# Patient Record
Sex: MALE | Race: WHITE | NOT HISPANIC OR LATINO | Employment: OTHER | URBAN - NONMETROPOLITAN AREA
[De-identification: names, ages, dates, MRNs, and addresses within clinical notes are randomized per-mention and may not be internally consistent; named-entity substitution may affect disease eponyms.]

---

## 2017-01-09 ENCOUNTER — PROCEDURE VISIT (OUTPATIENT)
Dept: FAMILY MEDICINE CLINIC | Facility: CLINIC | Age: 42
End: 2017-01-09

## 2017-01-09 VITALS
BODY MASS INDEX: 24.62 KG/M2 | RESPIRATION RATE: 16 BRPM | HEART RATE: 90 BPM | SYSTOLIC BLOOD PRESSURE: 115 MMHG | WEIGHT: 172 LBS | TEMPERATURE: 98.1 F | OXYGEN SATURATION: 99 % | HEIGHT: 70 IN | DIASTOLIC BLOOD PRESSURE: 81 MMHG

## 2017-01-09 DIAGNOSIS — E05.90 HYPERTHYROIDISM: ICD-10-CM

## 2017-01-09 DIAGNOSIS — L98.9 SKIN LESION OF BACK: Primary | ICD-10-CM

## 2017-01-09 PROCEDURE — 11400 EXC TR-EXT B9+MARG 0.5 CM<: CPT | Performed by: INTERNAL MEDICINE

## 2017-01-09 NOTE — MR AVS SNAPSHOT
Douglas Tan   1/9/2017 1:15 PM   Procedure visit    Dept Phone:  [phone]   Encounter #:  [id number]    Provider:  Nicolas Salcido MD   Department:  [facility] PRIMARY CARE                Your Full Care Plan              Your Updated Medication List          This list is accurate as of: 1/9/17  2:04 PM.  Always use your most recent med list.                ALPRAZolam 1 MG tablet   Commonly known as:  XANAX       gabapentin 600 MG tablet   Commonly known as:  NEURONTIN   Take 2 tablets by mouth 3 (three) times a day.       NUEDEXTA PO       oxyCODONE-acetaminophen  MG per tablet   Commonly known as:  PERCOCET       promethazine 25 MG tablet   Commonly known as:  PHENERGAN   Take 1 tablet by mouth every 6 (six) hours as needed for nausea or vomiting.               You Were Diagnosed With        Codes Comments    Skin lesion of back    -  Primary ICD-10-CM: L98.9  ICD-9-CM: 709.9     Hyperthyroidism     ICD-10-CM: E05.90  ICD-9-CM: 242.90       Instructions     None    Patient Instructions History      Upcoming Appointments     Visit Type Date Time Department    IN OFFICE PROCEDURE 1/9/2017  1:15 PM [facility]    IN OFFICE PROCEDURE 1/19/2017  1:15 PM [facility]  MILLIE R    FOLLOW UP 2/20/2017  9:00 AM [facility] NEURO CONSULT [facility]VN    OFFICE VISIT 3/16/2017 11:30 AM [facility]      MyChart Signup     Our records indicate that you have declined [facility] MyChart signup. If you would like to sign up for [email or web address], please email [email or web address] or call [phone] to obtain an activation code.             Other Info from Your Visit           Your Appointments     Jan 19, 2017  1:15 PM EST   IN OFFICE PROCEDURE with Nicolas Salcido MD   [facility] PRIMARY CARE (--)    [address] [address] [phone]           Bring medication list, test results, and radiology films that apply.       "   Feb 20, 2017  9:00 AM EST   Follow Up with Manuel Young MD   [facility] NEUROLOGY (--)    [address] [facility] [address] [phone]           Arrive 15 minutes prior to appointment.            Mar 16, 2017 11:30 AM EDT   Office Visit with Nicolas Salcido MD   [facility] PRIMARY CARE (--)    [address]  [facility] [address] [phone]           Arrive 15 minutes prior to appointment.              Allergies     Amitriptyline      Made him mean    Buspirone      Didn't \"feel right\"    Duloxetine Hcl      Urinary retention    Shellfish-derived Products      Venlafaxine      Constipation, GI upset    Penicillins  Rash      Reason for Visit     Skin Lesion Removal removal of skin lesion from back      Vital Signs     Blood Pressure Pulse Temperature Respirations Height Weight    115/81 (BP Location: Left arm, Patient Position: Sitting, Cuff Size: Large Adult) 90 98.1 °F (36.7 °C) (Oral) 16 70\" (177.8 cm) 172 lb (78 kg)    Oxygen Saturation Body Mass Index Smoking Status             99% 24.68 kg/m2 Current Every Day Smoker         Problems and Diagnoses Noted     Skin lesion of back    -  Primary    Hyperthyroidism            "

## 2017-01-09 NOTE — PROGRESS NOTES
Skin Lesion Removal.  Indication: uncertain behavior   Pt was consented. Area prepped.  Lidocaine with epi 1% given subepidermally.  Shave of entire lesion done.  0.5 cm.  Electrodesiccation used for hemostasis.  Specimen sent to path.  Verbal wound care instructions given.  Pt tolerated procedure well.  Location left upper back.

## 2017-01-12 NOTE — PROGRESS NOTES
Please let them know the lesion is not cancer by we need to stay on these lesion and continue to remove them.

## 2017-02-06 DIAGNOSIS — R11.0 CHRONIC NAUSEA: ICD-10-CM

## 2017-02-06 DIAGNOSIS — G43.709 CHRONIC MIGRAINE WITHOUT AURA WITHOUT STATUS MIGRAINOSUS, NOT INTRACTABLE: ICD-10-CM

## 2017-02-06 DIAGNOSIS — G89.4 CHRONIC PAIN SYNDROME: ICD-10-CM

## 2017-02-06 DIAGNOSIS — M54.2 NECK PAIN: ICD-10-CM

## 2017-02-06 DIAGNOSIS — R25.2 SPASM: ICD-10-CM

## 2017-02-06 RX ORDER — PROMETHAZINE HYDROCHLORIDE 25 MG/1
25 TABLET ORAL EVERY 6 HOURS PRN
Qty: 50 TABLET | Refills: 0 | Status: SHIPPED | OUTPATIENT
Start: 2017-02-06 | End: 2017-03-08 | Stop reason: SDUPTHER

## 2017-02-06 RX ORDER — GABAPENTIN 600 MG/1
1200 TABLET ORAL 3 TIMES DAILY
Qty: 180 TABLET | Refills: 0 | Status: SHIPPED | OUTPATIENT
Start: 2017-02-06 | End: 2017-03-08 | Stop reason: SDUPTHER

## 2017-02-23 ENCOUNTER — HOSPITAL ENCOUNTER (OUTPATIENT)
Dept: OTHER | Age: 42
Discharge: HOME | End: 2017-02-23

## 2017-03-06 DIAGNOSIS — R25.2 SPASM: ICD-10-CM

## 2017-03-06 DIAGNOSIS — G89.4 CHRONIC PAIN SYNDROME: ICD-10-CM

## 2017-03-06 DIAGNOSIS — M54.2 NECK PAIN: ICD-10-CM

## 2017-03-06 DIAGNOSIS — G43.709 CHRONIC MIGRAINE WITHOUT AURA WITHOUT STATUS MIGRAINOSUS, NOT INTRACTABLE: ICD-10-CM

## 2017-03-06 RX ORDER — GABAPENTIN 600 MG/1
TABLET ORAL
Qty: 180 TABLET | Refills: 0 | OUTPATIENT
Start: 2017-03-06

## 2017-03-08 ENCOUNTER — TELEPHONE (OUTPATIENT)
Dept: NEUROLOGY | Facility: CLINIC | Age: 42
End: 2017-03-08

## 2017-03-08 DIAGNOSIS — R11.0 CHRONIC NAUSEA: ICD-10-CM

## 2017-03-08 DIAGNOSIS — G89.4 CHRONIC PAIN SYNDROME: ICD-10-CM

## 2017-03-08 DIAGNOSIS — R25.2 SPASM: ICD-10-CM

## 2017-03-08 DIAGNOSIS — G43.709 CHRONIC MIGRAINE WITHOUT AURA WITHOUT STATUS MIGRAINOSUS, NOT INTRACTABLE: ICD-10-CM

## 2017-03-08 DIAGNOSIS — M54.2 NECK PAIN: ICD-10-CM

## 2017-03-08 RX ORDER — PROMETHAZINE HYDROCHLORIDE 25 MG/1
25 TABLET ORAL EVERY 6 HOURS PRN
Qty: 50 TABLET | Refills: 0 | Status: SHIPPED | OUTPATIENT
Start: 2017-03-08 | End: 2017-03-29 | Stop reason: SDUPTHER

## 2017-03-08 RX ORDER — GABAPENTIN 600 MG/1
1200 TABLET ORAL 3 TIMES DAILY
Qty: 180 TABLET | Refills: 0 | Status: SHIPPED | OUTPATIENT
Start: 2017-03-08 | End: 2017-03-29 | Stop reason: SDUPTHER

## 2017-03-08 NOTE — TELEPHONE ENCOUNTER
PT CALLED IN STATED THAT HE NEEDED HIS GABAPENTIN AND PROMETHAZINE REFILLED. INFORMED HIM THAT I WILL SEND IN ONE MONTH BUT HE NEEDED TO MAKE HIS 3/29 APPT TO GET ADDITIONAL REFILLS.

## 2017-03-29 ENCOUNTER — OFFICE VISIT (OUTPATIENT)
Dept: NEUROLOGY | Facility: CLINIC | Age: 42
End: 2017-03-29

## 2017-03-29 VITALS
BODY MASS INDEX: 22.4 KG/M2 | HEART RATE: 84 BPM | SYSTOLIC BLOOD PRESSURE: 126 MMHG | HEIGHT: 71 IN | OXYGEN SATURATION: 99 % | DIASTOLIC BLOOD PRESSURE: 88 MMHG | WEIGHT: 160 LBS

## 2017-03-29 DIAGNOSIS — G89.4 CHRONIC PAIN SYNDROME: ICD-10-CM

## 2017-03-29 DIAGNOSIS — R11.0 CHRONIC NAUSEA: ICD-10-CM

## 2017-03-29 DIAGNOSIS — G43.709 CHRONIC MIGRAINE WITHOUT AURA WITHOUT STATUS MIGRAINOSUS, NOT INTRACTABLE: ICD-10-CM

## 2017-03-29 DIAGNOSIS — R25.2 SPASM: ICD-10-CM

## 2017-03-29 DIAGNOSIS — M54.2 NECK PAIN: ICD-10-CM

## 2017-03-29 DIAGNOSIS — F51.04 CHRONIC INSOMNIA: Primary | ICD-10-CM

## 2017-03-29 PROCEDURE — 99214 OFFICE O/P EST MOD 30 MIN: CPT | Performed by: PSYCHIATRY & NEUROLOGY

## 2017-03-29 RX ORDER — DOXEPIN HYDROCHLORIDE 25 MG/1
25 CAPSULE ORAL NIGHTLY
Qty: 30 CAPSULE | Refills: 5 | Status: SHIPPED | OUTPATIENT
Start: 2017-03-29 | End: 2017-09-25 | Stop reason: SINTOL

## 2017-03-29 RX ORDER — GABAPENTIN 600 MG/1
1200 TABLET ORAL 3 TIMES DAILY
Qty: 180 TABLET | Refills: 5 | Status: SHIPPED | OUTPATIENT
Start: 2017-03-29 | End: 2017-09-25 | Stop reason: SDUPTHER

## 2017-03-29 RX ORDER — PROMETHAZINE HYDROCHLORIDE 25 MG/1
25 TABLET ORAL EVERY 6 HOURS PRN
Qty: 50 TABLET | Refills: 5 | Status: SHIPPED | OUTPATIENT
Start: 2017-03-29 | End: 2017-09-25 | Stop reason: SDUPTHER

## 2017-03-29 RX ORDER — OXYCODONE HYDROCHLORIDE 10 MG/1
10 TABLET ORAL 4 TIMES DAILY
COMMUNITY
Start: 2017-03-16 | End: 2017-05-18 | Stop reason: HOSPADM

## 2017-03-29 NOTE — PROGRESS NOTES
Subjective:     Patient ID: Douglas Tan is a 41 y.o. male.    Headache    This is a chronic problem. The current episode started more than 1 year ago. The problem occurs intermittently. The problem has been unchanged. Associated symptoms include abdominal pain, dizziness, eye pain, insomnia, nausea, numbness and rhinorrhea. Pertinent negatives include no back pain, coughing, ear pain, fever, hearing loss, neck pain, photophobia, seizures, sore throat, vomiting or weakness.   Neck Pain    This is a chronic problem. The current episode started more than 1 year ago. The problem occurs constantly. The problem has been unchanged. Associated symptoms include headaches and numbness. Pertinent negatives include no chest pain, fever, photophobia or weakness.   Back Pain   This is a chronic problem. The current episode started more than 1 year ago. The problem occurs constantly. The problem is unchanged. Associated symptoms include abdominal pain, headaches and numbness. Pertinent negatives include no chest pain, dysuria, fever or weakness.   Insomnia   This is a chronic problem. The current episode started more than 1 year ago. The problem occurs intermittently. The problem has been gradually worsening. Associated symptoms include abdominal pain, fatigue, headaches, joint swelling (& STIFFNESS), nausea and numbness. Pertinent negatives include no arthralgias, chest pain, chills, coughing, fever, myalgias, neck pain, rash, sore throat, vomiting or weakness.     The following portions of the patient's history were reviewed and updated as appropriate: allergies, current medications, past family history, past medical history, past social history, past surgical history and problem list.  Headaches are some better. Continues in pain management. Neck and back pain unchanged, worse with neck movements, not much better with nerve blocks and rhizotomies. compains of insomnia.  Review of Systems   Constitutional: Positive for  fatigue. Negative for chills, fever and unexpected weight change.        FEELING POORLY   HENT: Positive for rhinorrhea. Negative for ear pain, hearing loss, nosebleeds and sore throat.    Eyes: Positive for pain. Negative for photophobia, discharge, itching and visual disturbance.   Respiratory: Negative for cough, chest tightness, shortness of breath and wheezing.    Cardiovascular: Negative for chest pain, palpitations and leg swelling.   Gastrointestinal: Positive for abdominal pain and nausea. Negative for blood in stool, constipation, diarrhea and vomiting.   Genitourinary: Positive for testicular pain. Negative for dysuria, frequency, hematuria and urgency.   Musculoskeletal: Positive for joint swelling (& STIFFNESS). Negative for arthralgias, back pain, gait problem, myalgias, neck pain and neck stiffness.        LIMB PAIN   Skin: Negative for rash and wound.   Allergic/Immunologic: Negative for environmental allergies and food allergies.   Neurological: Positive for dizziness, numbness and headaches. Negative for tremors, seizures, syncope, speech difficulty, weakness and light-headedness.        TINGLING   Hematological: Negative for adenopathy. Does not bruise/bleed easily.   Psychiatric/Behavioral: Positive for dysphoric mood and sleep disturbance. Negative for agitation, confusion, decreased concentration, hallucinations and suicidal ideas. The patient is nervous/anxious and has insomnia.         Objective:    Neurologic Exam     Mental Status   Oriented to person, place, and time.     Cranial Nerves     CN III, IV, VI   Pupils are equal, round, and reactive to light.  Extraocular motions are normal.     Motor Exam     Strength   Strength 5/5 throughout.       Physical Exam   Constitutional: He is oriented to person, place, and time. He appears well-developed and well-nourished.   HENT:   Head: Normocephalic and atraumatic.   Eyes: EOM are normal. Pupils are equal, round, and reactive to light.   Neck:  Normal range of motion. Neck supple. Carotid bruit is not present.   Cardiovascular: Normal rate, regular rhythm, normal heart sounds and intact distal pulses.    Pulmonary/Chest: Effort normal and breath sounds normal.   Abdominal: Soft. Bowel sounds are normal.   Musculoskeletal: Normal range of motion.   Neurological: He is alert and oriented to person, place, and time. He has normal strength and normal reflexes. No cranial nerve deficit or sensory deficit. He displays a negative Romberg sign. Coordination and gait normal. He displays no Babinski's sign on the right side. He displays no Babinski's sign on the left side.   Skin: Skin is warm.   Psychiatric: He has a normal mood and affect. His behavior is normal. Thought content normal. Cognition and memory are normal.       Assessment/Plan:     Douglas was seen today for back pain.    Diagnoses and all orders for this visit:    Chronic insomnia  -     doxepin (SINEquan) 25 MG capsule; Take 1 capsule by mouth Every Night.    Chronic migraine without aura without status migrainosus, not intractable  -     gabapentin (NEURONTIN) 600 MG tablet; Take 2 tablets by mouth 3 (Three) Times a Day.  -     promethazine (PHENERGAN) 25 MG tablet; Take 1 tablet by mouth Every 6 (Six) Hours As Needed for Nausea or Vomiting.  -     doxepin (SINEquan) 25 MG capsule; Take 1 capsule by mouth Every Night.    Neck pain  -     gabapentin (NEURONTIN) 600 MG tablet; Take 2 tablets by mouth 3 (Three) Times a Day.  -     doxepin (SINEquan) 25 MG capsule; Take 1 capsule by mouth Every Night.    Chronic pain syndrome  -     gabapentin (NEURONTIN) 600 MG tablet; Take 2 tablets by mouth 3 (Three) Times a Day.  -     doxepin (SINEquan) 25 MG capsule; Take 1 capsule by mouth Every Night.    Spasm  -     gabapentin (NEURONTIN) 600 MG tablet; Take 2 tablets by mouth 3 (Three) Times a Day.    Chronic nausea  -     promethazine (PHENERGAN) 25 MG tablet; Take 1 tablet by mouth Every 6 (Six) Hours As  Needed for Nausea or Vomiting.

## 2017-05-15 ENCOUNTER — APPOINTMENT (OUTPATIENT)
Dept: PREADMISSION TESTING | Facility: HOSPITAL | Age: 42
End: 2017-05-15

## 2017-05-15 VITALS — HEIGHT: 70 IN | BODY MASS INDEX: 24.46 KG/M2 | WEIGHT: 170.86 LBS

## 2017-05-15 LAB
DEPRECATED RDW RBC AUTO: 50.7 FL (ref 37–54)
ERYTHROCYTE [DISTWIDTH] IN BLOOD BY AUTOMATED COUNT: 14 % (ref 11.3–14.5)
HBA1C MFR BLD: 4.5 % (ref 4.8–5.6)
HCT VFR BLD AUTO: 46.9 % (ref 38.9–50.9)
HGB BLD-MCNC: 15.7 G/DL (ref 13.1–17.5)
MCH RBC QN AUTO: 33.3 PG (ref 27–31)
MCHC RBC AUTO-ENTMCNC: 33.5 G/DL (ref 32–36)
MCV RBC AUTO: 99.4 FL (ref 80–99)
PLATELET # BLD AUTO: 186 10*3/MM3 (ref 150–450)
PMV BLD AUTO: 10.2 FL (ref 6–12)
RBC # BLD AUTO: 4.72 10*6/MM3 (ref 4.2–5.76)
WBC NRBC COR # BLD: 6.6 10*3/MM3 (ref 3.5–10.8)

## 2017-05-15 RX ORDER — LURASIDONE HYDROCHLORIDE 40 MG/1
40 TABLET, FILM COATED ORAL DAILY
COMMUNITY
End: 2019-11-18 | Stop reason: DRUGHIGH

## 2017-05-15 RX ORDER — GABAPENTIN 600 MG/1
TABLET ORAL
COMMUNITY
Start: 2015-09-18 | End: 2017-05-15 | Stop reason: SDUPTHER

## 2017-05-15 RX ORDER — OXYCODONE AND ACETAMINOPHEN 10; 325 MG/1; MG/1
TABLET ORAL EVERY 8 HOURS
COMMUNITY
End: 2017-05-15

## 2017-05-15 RX ORDER — LURASIDONE HYDROCHLORIDE 20 MG/1
TABLET, FILM COATED ORAL
Refills: 0 | COMMUNITY
Start: 2017-03-07 | End: 2017-05-15

## 2017-05-15 RX ORDER — PROMETHAZINE HYDROCHLORIDE 25 MG/1
TABLET ORAL EVERY 6 HOURS
COMMUNITY
End: 2017-05-15 | Stop reason: SDUPTHER

## 2017-05-16 ENCOUNTER — ANESTHESIA (OUTPATIENT)
Dept: PERIOP | Facility: HOSPITAL | Age: 42
End: 2017-05-16

## 2017-05-16 ENCOUNTER — ANESTHESIA EVENT (OUTPATIENT)
Dept: PERIOP | Facility: HOSPITAL | Age: 42
End: 2017-05-16

## 2017-05-16 ENCOUNTER — HOSPITAL ENCOUNTER (INPATIENT)
Facility: HOSPITAL | Age: 42
LOS: 2 days | Discharge: HOME | End: 2017-05-18
Attending: PAIN MEDICINE | Admitting: INTERNAL MEDICINE

## 2017-05-16 PROBLEM — R52 PAIN AGGRAVATED BY ACTIVITIES OF DAILY LIVING: Status: ACTIVE | Noted: 2017-05-16

## 2017-05-16 PROCEDURE — 25010000002 FENTANYL CITRATE (PF) 100 MCG/2ML SOLUTION: Performed by: NURSE ANESTHETIST, CERTIFIED REGISTERED

## 2017-05-16 PROCEDURE — C1755 CATHETER, INTRASPINAL: HCPCS | Performed by: PAIN MEDICINE

## 2017-05-16 PROCEDURE — 25010000003 CEFAZOLIN IN DEXTROSE 2-4 GM/100ML-% SOLUTION: Performed by: PAIN MEDICINE

## 2017-05-16 PROCEDURE — 25010000002 PROPOFOL 10 MG/ML EMULSION: Performed by: NURSE ANESTHETIST, CERTIFIED REGISTERED

## 2017-05-16 PROCEDURE — 99221 1ST HOSP IP/OBS SF/LOW 40: CPT | Performed by: INTERNAL MEDICINE

## 2017-05-16 PROCEDURE — 00HU33Z INSERTION OF INFUSION DEVICE INTO SPINAL CANAL, PERCUTANEOUS APPROACH: ICD-10-PCS | Performed by: PAIN MEDICINE

## 2017-05-16 PROCEDURE — 25010000002 HYDROMORPHONE PER 4 MG

## 2017-05-16 RX ORDER — CEFAZOLIN SODIUM 2 G/100ML
2 INJECTION, SOLUTION INTRAVENOUS ONCE
Status: COMPLETED | OUTPATIENT
Start: 2017-05-16 | End: 2017-05-16

## 2017-05-16 RX ORDER — LIDOCAINE HYDROCHLORIDE 10 MG/ML
INJECTION, SOLUTION INFILTRATION; PERINEURAL AS NEEDED
Status: DISCONTINUED | OUTPATIENT
Start: 2017-05-16 | End: 2017-05-16 | Stop reason: HOSPADM

## 2017-05-16 RX ORDER — SODIUM CHLORIDE, SODIUM LACTATE, POTASSIUM CHLORIDE, CALCIUM CHLORIDE 600; 310; 30; 20 MG/100ML; MG/100ML; MG/100ML; MG/100ML
9 INJECTION, SOLUTION INTRAVENOUS CONTINUOUS
Status: DISCONTINUED | OUTPATIENT
Start: 2017-05-16 | End: 2017-05-16 | Stop reason: SDUPTHER

## 2017-05-16 RX ORDER — LIDOCAINE HYDROCHLORIDE 10 MG/ML
0.5 INJECTION, SOLUTION EPIDURAL; INFILTRATION; INTRACAUDAL; PERINEURAL ONCE AS NEEDED
Status: COMPLETED | OUTPATIENT
Start: 2017-05-16 | End: 2017-05-16

## 2017-05-16 RX ORDER — DOXEPIN HYDROCHLORIDE 25 MG/1
25 CAPSULE ORAL NIGHTLY
Status: DISCONTINUED | OUTPATIENT
Start: 2017-05-16 | End: 2017-05-18 | Stop reason: HOSPADM

## 2017-05-16 RX ORDER — FAMOTIDINE 10 MG/ML
20 INJECTION, SOLUTION INTRAVENOUS
Status: DISCONTINUED | OUTPATIENT
Start: 2017-05-16 | End: 2017-05-16 | Stop reason: HOSPADM

## 2017-05-16 RX ORDER — FAMOTIDINE 20 MG/1
20 TABLET, FILM COATED ORAL ONCE
Status: DISCONTINUED | OUTPATIENT
Start: 2017-05-16 | End: 2017-05-16 | Stop reason: HOSPADM

## 2017-05-16 RX ORDER — PROPOFOL 10 MG/ML
VIAL (ML) INTRAVENOUS AS NEEDED
Status: DISCONTINUED | OUTPATIENT
Start: 2017-05-16 | End: 2017-05-16 | Stop reason: SURG

## 2017-05-16 RX ORDER — FAMOTIDINE 20 MG/1
20 TABLET, FILM COATED ORAL
Status: DISCONTINUED | OUTPATIENT
Start: 2017-05-16 | End: 2017-05-16 | Stop reason: HOSPADM

## 2017-05-16 RX ORDER — ONDANSETRON 2 MG/ML
4 INJECTION INTRAMUSCULAR; INTRAVENOUS ONCE AS NEEDED
Status: DISCONTINUED | OUTPATIENT
Start: 2017-05-16 | End: 2017-05-16 | Stop reason: HOSPADM

## 2017-05-16 RX ORDER — SODIUM CHLORIDE 0.9 % (FLUSH) 0.9 %
1-10 SYRINGE (ML) INJECTION AS NEEDED
Status: DISCONTINUED | OUTPATIENT
Start: 2017-05-16 | End: 2017-05-16 | Stop reason: HOSPADM

## 2017-05-16 RX ORDER — FENTANYL CITRATE 50 UG/ML
INJECTION, SOLUTION INTRAMUSCULAR; INTRAVENOUS AS NEEDED
Status: DISCONTINUED | OUTPATIENT
Start: 2017-05-16 | End: 2017-05-16 | Stop reason: SURG

## 2017-05-16 RX ORDER — LIDOCAINE HYDROCHLORIDE 10 MG/ML
0.5 INJECTION, SOLUTION EPIDURAL; INFILTRATION; INTRACAUDAL; PERINEURAL ONCE AS NEEDED
Status: DISCONTINUED | OUTPATIENT
Start: 2017-05-16 | End: 2017-05-16 | Stop reason: HOSPADM

## 2017-05-16 RX ORDER — SODIUM CHLORIDE, SODIUM LACTATE, POTASSIUM CHLORIDE, CALCIUM CHLORIDE 600; 310; 30; 20 MG/100ML; MG/100ML; MG/100ML; MG/100ML
100 INJECTION, SOLUTION INTRAVENOUS CONTINUOUS
Status: DISCONTINUED | OUTPATIENT
Start: 2017-05-16 | End: 2017-05-18 | Stop reason: HOSPADM

## 2017-05-16 RX ORDER — FAMOTIDINE 10 MG/ML
20 INJECTION, SOLUTION INTRAVENOUS ONCE
Status: DISCONTINUED | OUTPATIENT
Start: 2017-05-16 | End: 2017-05-16 | Stop reason: HOSPADM

## 2017-05-16 RX ORDER — SODIUM CHLORIDE, SODIUM LACTATE, POTASSIUM CHLORIDE, CALCIUM CHLORIDE 600; 310; 30; 20 MG/100ML; MG/100ML; MG/100ML; MG/100ML
9 INJECTION, SOLUTION INTRAVENOUS CONTINUOUS PRN
Status: DISCONTINUED | OUTPATIENT
Start: 2017-05-16 | End: 2017-05-16 | Stop reason: HOSPADM

## 2017-05-16 RX ORDER — OXYCODONE HYDROCHLORIDE 5 MG/1
10 TABLET ORAL EVERY 6 HOURS PRN
Status: DISCONTINUED | OUTPATIENT
Start: 2017-05-16 | End: 2017-05-18 | Stop reason: HOSPADM

## 2017-05-16 RX ORDER — GABAPENTIN 300 MG/1
600 CAPSULE ORAL EVERY 8 HOURS SCHEDULED
Status: DISCONTINUED | OUTPATIENT
Start: 2017-05-16 | End: 2017-05-18 | Stop reason: HOSPADM

## 2017-05-16 RX ORDER — LURASIDONE HYDROCHLORIDE 20 MG/1
40 TABLET, FILM COATED ORAL NIGHTLY
Status: DISCONTINUED | OUTPATIENT
Start: 2017-05-16 | End: 2017-05-17

## 2017-05-16 RX ADMIN — FENTANYL CITRATE 100 MCG: 50 INJECTION, SOLUTION INTRAMUSCULAR; INTRAVENOUS at 15:50

## 2017-05-16 RX ADMIN — PROPOFOL 60 MG: 10 INJECTION, EMULSION INTRAVENOUS at 15:58

## 2017-05-16 RX ADMIN — SODIUM CHLORIDE, POTASSIUM CHLORIDE, SODIUM LACTATE AND CALCIUM CHLORIDE 9 ML/HR: 600; 310; 30; 20 INJECTION, SOLUTION INTRAVENOUS at 14:04

## 2017-05-16 RX ADMIN — SODIUM CHLORIDE, POTASSIUM CHLORIDE, SODIUM LACTATE AND CALCIUM CHLORIDE 100 ML/HR: 600; 310; 30; 20 INJECTION, SOLUTION INTRAVENOUS at 17:51

## 2017-05-16 RX ADMIN — OXYCODONE HYDROCHLORIDE 10 MG: 5 TABLET ORAL at 20:45

## 2017-05-16 RX ADMIN — LIDOCAINE HYDROCHLORIDE 0.5 ML: 10 INJECTION, SOLUTION EPIDURAL; INFILTRATION; INTRACAUDAL; PERINEURAL at 14:04

## 2017-05-16 RX ADMIN — FAMOTIDINE 20 MG: 20 TABLET ORAL at 14:04

## 2017-05-16 RX ADMIN — PROPOFOL 30 MG: 10 INJECTION, EMULSION INTRAVENOUS at 15:56

## 2017-05-16 RX ADMIN — SODIUM CHLORIDE, POTASSIUM CHLORIDE, SODIUM LACTATE AND CALCIUM CHLORIDE: 600; 310; 30; 20 INJECTION, SOLUTION INTRAVENOUS at 15:27

## 2017-05-16 RX ADMIN — CEFAZOLIN SODIUM 2 G: 2 INJECTION, SOLUTION INTRAVENOUS at 15:27

## 2017-05-16 RX ADMIN — GABAPENTIN 600 MG: 300 CAPSULE ORAL at 20:45

## 2017-05-16 RX ADMIN — DOXEPIN HYDROCHLORIDE 25 MG: 25 CAPSULE ORAL at 20:45

## 2017-05-17 PROCEDURE — 99232 SBSQ HOSP IP/OBS MODERATE 35: CPT | Performed by: INTERNAL MEDICINE

## 2017-05-17 RX ORDER — NICOTINE 21 MG/24HR
1 PATCH, TRANSDERMAL 24 HOURS TRANSDERMAL ONCE
Status: DISCONTINUED | OUTPATIENT
Start: 2017-05-17 | End: 2017-05-18

## 2017-05-17 RX ORDER — NICOTINE 21 MG/24HR
1 PATCH, TRANSDERMAL 24 HOURS TRANSDERMAL DAILY
Status: DISCONTINUED | OUTPATIENT
Start: 2017-05-18 | End: 2017-05-17

## 2017-05-17 RX ORDER — LURASIDONE HYDROCHLORIDE 20 MG/1
40 TABLET, FILM COATED ORAL NIGHTLY
Status: DISCONTINUED | OUTPATIENT
Start: 2017-05-17 | End: 2017-05-18 | Stop reason: HOSPADM

## 2017-05-17 RX ORDER — NICOTINE 21 MG/24HR
1 PATCH, TRANSDERMAL 24 HOURS TRANSDERMAL
Status: DISCONTINUED | OUTPATIENT
Start: 2017-05-17 | End: 2017-05-18 | Stop reason: HOSPADM

## 2017-05-17 RX ORDER — BETHANECHOL CHLORIDE 10 MG/1
10 TABLET ORAL
Status: DISCONTINUED | OUTPATIENT
Start: 2017-05-17 | End: 2017-05-18 | Stop reason: HOSPADM

## 2017-05-17 RX ADMIN — OXYCODONE HYDROCHLORIDE 10 MG: 5 TABLET ORAL at 20:32

## 2017-05-17 RX ADMIN — GABAPENTIN 600 MG: 300 CAPSULE ORAL at 20:33

## 2017-05-17 RX ADMIN — DOXEPIN HYDROCHLORIDE 25 MG: 25 CAPSULE ORAL at 20:32

## 2017-05-17 RX ADMIN — OXYCODONE HYDROCHLORIDE 10 MG: 5 TABLET ORAL at 08:23

## 2017-05-17 RX ADMIN — OXYCODONE HYDROCHLORIDE 10 MG: 5 TABLET ORAL at 02:14

## 2017-05-17 RX ADMIN — SODIUM CHLORIDE, POTASSIUM CHLORIDE, SODIUM LACTATE AND CALCIUM CHLORIDE 100 ML/HR: 600; 310; 30; 20 INJECTION, SOLUTION INTRAVENOUS at 12:54

## 2017-05-17 RX ADMIN — NICOTINE 1 PATCH: 21 PATCH, EXTENDED RELEASE TRANSDERMAL at 23:17

## 2017-05-17 RX ADMIN — OXYCODONE HYDROCHLORIDE 10 MG: 5 TABLET ORAL at 14:12

## 2017-05-17 RX ADMIN — BETHANECHOL CHLORIDE 10 MG: 10 TABLET ORAL at 20:32

## 2017-05-17 RX ADMIN — NICOTINE 1 PATCH: 14 PATCH TRANSDERMAL at 22:33

## 2017-05-17 RX ADMIN — BETHANECHOL CHLORIDE 10 MG: 10 TABLET ORAL at 22:33

## 2017-05-17 RX ADMIN — GABAPENTIN 600 MG: 300 CAPSULE ORAL at 12:59

## 2017-05-17 RX ADMIN — GABAPENTIN 600 MG: 300 CAPSULE ORAL at 06:03

## 2017-05-18 VITALS
WEIGHT: 170 LBS | DIASTOLIC BLOOD PRESSURE: 77 MMHG | TEMPERATURE: 98.7 F | OXYGEN SATURATION: 96 % | HEIGHT: 70 IN | BODY MASS INDEX: 24.34 KG/M2 | SYSTOLIC BLOOD PRESSURE: 111 MMHG | HEART RATE: 83 BPM | RESPIRATION RATE: 16 BRPM

## 2017-05-18 PROCEDURE — 99239 HOSP IP/OBS DSCHRG MGMT >30: CPT | Performed by: INTERNAL MEDICINE

## 2017-05-18 RX ADMIN — OXYCODONE HYDROCHLORIDE 10 MG: 5 TABLET ORAL at 05:10

## 2017-05-18 RX ADMIN — BETHANECHOL CHLORIDE 10 MG: 10 TABLET ORAL at 05:10

## 2017-05-18 RX ADMIN — GABAPENTIN 600 MG: 300 CAPSULE ORAL at 05:10

## 2017-05-22 ENCOUNTER — TRANSITIONAL CARE MANAGEMENT TELEPHONE ENCOUNTER (OUTPATIENT)
Dept: FAMILY MEDICINE CLINIC | Facility: CLINIC | Age: 42
End: 2017-05-22

## 2017-09-19 DIAGNOSIS — G89.4 CHRONIC PAIN SYNDROME: ICD-10-CM

## 2017-09-19 DIAGNOSIS — R25.2 SPASM: ICD-10-CM

## 2017-09-19 DIAGNOSIS — G43.709 CHRONIC MIGRAINE WITHOUT AURA WITHOUT STATUS MIGRAINOSUS, NOT INTRACTABLE: ICD-10-CM

## 2017-09-19 DIAGNOSIS — M54.2 NECK PAIN: ICD-10-CM

## 2017-09-20 RX ORDER — GABAPENTIN 600 MG/1
TABLET ORAL
Qty: 180 TABLET | Refills: 0 | OUTPATIENT
Start: 2017-09-20

## 2017-09-25 ENCOUNTER — OFFICE VISIT (OUTPATIENT)
Dept: NEUROLOGY | Facility: CLINIC | Age: 42
End: 2017-09-25

## 2017-09-25 VITALS
WEIGHT: 165 LBS | SYSTOLIC BLOOD PRESSURE: 125 MMHG | HEIGHT: 70 IN | DIASTOLIC BLOOD PRESSURE: 89 MMHG | OXYGEN SATURATION: 99 % | BODY MASS INDEX: 23.62 KG/M2

## 2017-09-25 DIAGNOSIS — G89.4 CHRONIC PAIN SYNDROME: ICD-10-CM

## 2017-09-25 DIAGNOSIS — M54.2 NECK PAIN: ICD-10-CM

## 2017-09-25 DIAGNOSIS — R11.0 CHRONIC NAUSEA: ICD-10-CM

## 2017-09-25 DIAGNOSIS — G43.709 CHRONIC MIGRAINE WITHOUT AURA WITHOUT STATUS MIGRAINOSUS, NOT INTRACTABLE: ICD-10-CM

## 2017-09-25 DIAGNOSIS — M47.812 SPONDYLOSIS OF CERVICAL REGION WITHOUT MYELOPATHY OR RADICULOPATHY: ICD-10-CM

## 2017-09-25 DIAGNOSIS — F51.04 CHRONIC INSOMNIA: Primary | ICD-10-CM

## 2017-09-25 DIAGNOSIS — R25.2 SPASM: ICD-10-CM

## 2017-09-25 PROCEDURE — 99214 OFFICE O/P EST MOD 30 MIN: CPT | Performed by: PSYCHIATRY & NEUROLOGY

## 2017-09-25 RX ORDER — GABAPENTIN 600 MG/1
1200 TABLET ORAL 3 TIMES DAILY
Qty: 180 TABLET | Refills: 5
Start: 2017-09-25 | End: 2018-03-21 | Stop reason: SDUPTHER

## 2017-09-25 RX ORDER — MELOXICAM 7.5 MG/1
7.5 TABLET ORAL DAILY
Qty: 30 TABLET | Refills: 11 | Status: SHIPPED | OUTPATIENT
Start: 2017-09-25 | End: 2018-09-25

## 2017-09-25 RX ORDER — TRAZODONE HYDROCHLORIDE 50 MG/1
50 TABLET ORAL NIGHTLY
Qty: 30 TABLET | Refills: 11 | Status: SHIPPED | OUTPATIENT
Start: 2017-09-25 | End: 2019-11-18

## 2017-09-25 RX ORDER — BUTALBITAL, ACETAMINOPHEN AND CAFFEINE 50; 325; 40 MG/1; MG/1; MG/1
1 TABLET ORAL EVERY 4 HOURS PRN
COMMUNITY
End: 2019-11-18

## 2017-09-25 RX ORDER — PROMETHAZINE HYDROCHLORIDE 25 MG/1
25 TABLET ORAL EVERY 6 HOURS PRN
Qty: 50 TABLET | Refills: 5 | Status: SHIPPED | OUTPATIENT
Start: 2017-09-25 | End: 2018-03-21 | Stop reason: SDUPTHER

## 2017-09-25 NOTE — PROGRESS NOTES
Subjective:     Patient ID: Douglas Tan is a 41 y.o. male.    HPI:   History of Present Illness  The following portions of the patient's history were reviewed and updated as appropriate: allergies, current medications, past family history, past medical history, past social history, past surgical history and problem list.     Patient reports persistent neck and back pain, insomnia. He did not tolerate a pain pump trial, now in a new pain clinic in [place]. He could not tolerate doxepin that caused excessive dryness of the mouth. He has tried trazodone in the past that caused some am sedation, willing to try at a lower dose. Has had problems with hiatal hernia, thinks that he did tolerate Mobic in the past.    Past Medical History:   Diagnosis Date   • Anxiety    • Arthritis    • Back pain, chronic    • Carpal tunnel syndrome    • Constipation    • Depression    • Diverticulosis    • Fibromyalgia    • Fractures    • GERD (gastroesophageal reflux disease)    • Head ache    • Hiatal hernia    • History of blood transfusion    • Hypertension     pt states no longer has it, was from anxiety attacks.    • Migraine    • Shoulder injury    • Tendonitis    • Tobacco abuse    • Traumatic brain injury        Past Surgical History:   Procedure Laterality Date   • EYE SURGERY Left 02/1999    reconstruction   • EYE SURGERY      reconstruction of left eye    • SPINAL CORD STIMULATOR IMPLANT N/A 5/16/2017    Procedure: INTRATHECAL CATHETER PLACEMENT ;  Surgeon: Aristides Zafar MD;  Location: [facility];  Service:    • TRACHEOSTOMY     • WISDOM TOOTH EXTRACTION         Social History     Social History   • Marital status:      Spouse name: N/A   • Number of children: N/A   • Years of education: N/A     Occupational History   • Not on file.     Social History Main Topics   • Smoking status: Current Every Day Smoker     Packs/day: 1.00     Years: 20.00     Types: Cigarettes   • Smokeless tobacco: Former User   •  Alcohol use No      Comment: stopped drinking in 04/2015   • Drug use: No   • Sexual activity: Defer     Other Topics Concern   • Not on file     Social History Narrative       Family History   Problem Relation Age of Onset   • Arthritis Mother    • Other Mother      Malignant neoplasm   • Arthritis Other    • Other Other      Malignant neoplasm   • Hypertension Other    • Alcohol abuse Father    • Arthritis Other    • Cancer Other         Review of Systems   Constitutional: Positive for fatigue. Negative for chills, fever and unexpected weight change.   HENT: Positive for postnasal drip. Negative for ear pain, hearing loss, nosebleeds, rhinorrhea and sore throat.    Eyes: Positive for pain. Negative for photophobia, discharge, itching and visual disturbance.        Eye sight problems   Respiratory: Negative.  Negative for cough, chest tightness, shortness of breath and wheezing.    Cardiovascular: Negative.  Negative for chest pain, palpitations and leg swelling.   Gastrointestinal: Positive for abdominal pain and nausea. Negative for blood in stool, constipation, diarrhea and vomiting.   Endocrine: Negative.    Genitourinary: Negative.  Negative for dysuria, frequency, hematuria and urgency.   Musculoskeletal: Positive for arthralgias, back pain, joint swelling, myalgias, neck pain and neck stiffness. Negative for gait problem.   Skin: Negative.  Negative for rash and wound.   Allergic/Immunologic: Negative.  Negative for environmental allergies and food allergies.   Neurological: Positive for dizziness, light-headedness, numbness and headaches. Negative for tremors, seizures, syncope, speech difficulty and weakness.   Hematological: Negative.  Negative for adenopathy. Does not bruise/bleed easily.   Psychiatric/Behavioral: Positive for sleep disturbance. Negative for agitation, confusion, decreased concentration, hallucinations and suicidal ideas. The patient is nervous/anxious.         Depression         Objective:    Neurologic Exam     Mental Status   Oriented to person, place, and time.       Physical Exam   Constitutional: He is oriented to person, place, and time. He appears well-developed and well-nourished.   Cardiovascular: Normal rate and regular rhythm.    Pulmonary/Chest: Effort normal.   Neurological: He is alert and oriented to person, place, and time. He has normal reflexes.   Psychiatric: He has a normal mood and affect. His behavior is normal. Thought content normal.       Assessment/Plan:       Douglas was seen today for chronic insomnia.    Diagnoses and all orders for this visit:    Chronic insomnia  -     traZODone (DESYREL) 50 MG tablet; Take 1 tablet by mouth Every Night.    Chronic migraine without aura without status migrainosus, not intractable  -     promethazine (PHENERGAN) 25 MG tablet; Take 1 tablet by mouth Every 6 (Six) Hours As Needed for Nausea or Vomiting.  -     gabapentin (NEURONTIN) 600 MG tablet; Take 2 tablets by mouth 3 (Three) Times a Day.    Chronic nausea  -     promethazine (PHENERGAN) 25 MG tablet; Take 1 tablet by mouth Every 6 (Six) Hours As Needed for Nausea or Vomiting.    Neck pain  -     gabapentin (NEURONTIN) 600 MG tablet; Take 2 tablets by mouth 3 (Three) Times a Day.    Chronic pain syndrome  -     gabapentin (NEURONTIN) 600 MG tablet; Take 2 tablets by mouth 3 (Three) Times a Day.  -     meloxicam (MOBIC) 7.5 MG tablet; Take 1 tablet by mouth Daily.    Spasm  -     gabapentin (NEURONTIN) 600 MG tablet; Take 2 tablets by mouth 3 (Three) Times a Day.    Spondylosis of cervical region without myelopathy or radiculopathy  -     meloxicam (MOBIC) 7.5 MG tablet; Take 1 tablet by mouth Daily.       The patient has read and signed the [facility] Controlled Substance Contract.  I will continue to see patient for regular follow up appointments.  They are well controlled on their medication.  RODRIGO is updated every 3 months. The patient is aware of the potential  for addiction and dependence.    The patient has read and signed the [facility] Controlled Substance Contract.  I will continue to see patient for regular follow up appointments.  They are well controlled on their medication.  RODRIGO is updated every 3 months. The patient is aware of the potential for addiction. The patient has read and signed the [facility] Controlled Substance Contract.  I will continue to see patient for regular follow up appointments.  They are well controlled on their medication.  RODRIGO is updated every 3 months. The patient is aware of the potential for addiction and dependence.on and dependence.    Manuel Young MD  9/25/2017

## 2017-10-19 ENCOUNTER — OFFICE VISIT (OUTPATIENT)
Dept: NEUROLOGY | Facility: CLINIC | Age: 42
End: 2017-10-19

## 2017-10-19 VITALS
WEIGHT: 160 LBS | HEIGHT: 70 IN | BODY MASS INDEX: 22.9 KG/M2 | OXYGEN SATURATION: 98 % | SYSTOLIC BLOOD PRESSURE: 128 MMHG | HEART RATE: 85 BPM | DIASTOLIC BLOOD PRESSURE: 78 MMHG

## 2017-10-19 DIAGNOSIS — S09.90XS INJURY OF HEAD, SEQUELA: ICD-10-CM

## 2017-10-19 DIAGNOSIS — R56.9 GENERALIZED CONVULSIVE SEIZURE (HCC): Primary | ICD-10-CM

## 2017-10-19 DIAGNOSIS — G93.89 BRAIN MASS: ICD-10-CM

## 2017-10-19 DIAGNOSIS — G44.221 CHRONIC TENSION-TYPE HEADACHE, INTRACTABLE: ICD-10-CM

## 2017-10-19 PROCEDURE — 99214 OFFICE O/P EST MOD 30 MIN: CPT | Performed by: PSYCHIATRY & NEUROLOGY

## 2017-10-19 RX ORDER — LEVETIRACETAM 500 MG/1
500 TABLET ORAL
COMMUNITY
Start: 2017-09-25 | End: 2018-06-17 | Stop reason: HOSPADM

## 2017-10-19 NOTE — PROGRESS NOTES
Subjective:     Patient ID: Douglas Tan is a 41 y.o. male.    CC:   Chief Complaint   Patient presents with   • tumor or cyst behind left eye       HPI:   History of Present Illness  The following portions of the patient's history were reviewed and updated as appropriate: allergies, current medications, past family history, past medical history, past social history, past surgical history and problem list.     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 He had stopped Xanax several days before his seizure. Denies prior h/o seizures, alcohol abuse. He was prescribed Keppra in the ER that he stopped taking. Complains of increased frequency of headaches since his seizures. He wants Fioricet with codeine for headaches, followed by pain management, has a contract for opiates.    Past Medical History:   Diagnosis Date   • Anxiety    • Arthritis    • Back pain, chronic    • Carpal tunnel syndrome    • Constipation    • Depression    • Diverticulosis    • Fibromyalgia    • Fractures    • GERD (gastroesophageal reflux disease)    • Head ache    • Hiatal hernia    • History of blood transfusion    • Hypertension     pt states no longer has it, was from anxiety attacks.    • Migraine    • Shoulder injury    • Tendonitis    • Tobacco abuse    • Traumatic brain injury        Past Surgical History:   Procedure Laterality Date   • EYE SURGERY Left 02/1999    reconstruction   • EYE SURGERY      reconstruction of left eye    • SPINAL CORD STIMULATOR IMPLANT N/A 5/16/2017    Procedure: INTRATHECAL CATHETER PLACEMENT ;  Surgeon: Aristides Zafar MD;  Location: [facility];  Service:    • TRACHEOSTOMY     • WISDOM TOOTH EXTRACTION         Social History     Social History   • Marital status:      Spouse name: N/A   • Number of children: N/A   • Years of education: N/A     Occupational History   • Not on file.     Social  History Main Topics   • Smoking status: Current Every Day Smoker     Packs/day: 1.00     Years: 20.00     Types: Cigarettes   • Smokeless tobacco: Former User   • Alcohol use No      Comment: stopped drinking in 04/2015   • Drug use: No   • Sexual activity: Defer     Other Topics Concern   • Not on file     Social History Narrative       Family History   Problem Relation Age of Onset   • Arthritis Mother    • Other Mother      Malignant neoplasm   • Arthritis Other    • Other Other      Malignant neoplasm   • Hypertension Other    • Alcohol abuse Father    • Arthritis Other    • Cancer Other         Review of Systems   Constitutional: Positive for fatigue. Negative for chills, fever and unexpected weight change.   HENT: Positive for sore throat. Negative for ear pain, hearing loss, nosebleeds and rhinorrhea.    Eyes: Positive for pain. Negative for photophobia, discharge, itching and visual disturbance.        Eye sight problems     Respiratory: Positive for wheezing. Negative for cough, chest tightness and shortness of breath.    Cardiovascular: Negative for chest pain, palpitations and leg swelling.   Gastrointestinal: Positive for abdominal pain and nausea. Negative for blood in stool, constipation, diarrhea and vomiting.   Genitourinary: Positive for testicular pain. Negative for dysuria, frequency, hematuria and urgency.   Musculoskeletal: Positive for arthralgias, back pain, joint swelling, myalgias, neck pain and neck stiffness. Negative for gait problem.   Skin: Negative for rash and wound.   Allergic/Immunologic: Negative for environmental allergies and food allergies.   Neurological: Positive for dizziness, seizures, light-headedness, numbness and headaches. Negative for tremors, syncope, speech difficulty and weakness.   Hematological: Negative for adenopathy. Does not bruise/bleed easily.   Psychiatric/Behavioral: Positive for agitation, decreased concentration and sleep disturbance. Negative for  confusion, hallucinations and suicidal ideas. The patient is nervous/anxious.         Depression          Objective:    Neurologic Exam     Mental Status   Oriented to person, place, and time.       Physical Exam   Constitutional: He is oriented to person, place, and time. He appears well-developed and well-nourished.   Cardiovascular: Normal rate and regular rhythm.    Pulmonary/Chest: Effort normal.   Neurological: He is alert and oriented to person, place, and time. He has normal reflexes.   Psychiatric: He has a normal mood and affect. His behavior is normal. Thought content normal.       Assessment/Plan:       Douglas was seen today for tumor or cyst behind left eye.    Diagnoses and all orders for this visit:    Generalized convulsive seizure  -     MRI Brain With & Without Contrast  -     EEG Awake or Drowsy Routine    Brain mass  -     MRI Brain With & Without Contrast        -     I reviewed his MRI from 2015 noting some change in the left frontal sinus suggestive of osteoma, probably a chronic finding, patient reassured.  Injury of head, sequela    Chronic tension-type headache, intractable  -     MRI Brain With & Without Contrast     No driving for 90 days, follow up with pain management.      Manuel Young MD  10/24/2017

## 2018-03-21 ENCOUNTER — OFFICE VISIT (OUTPATIENT)
Dept: NEUROLOGY | Facility: CLINIC | Age: 43
End: 2018-03-21

## 2018-03-21 VITALS
HEART RATE: 82 BPM | DIASTOLIC BLOOD PRESSURE: 70 MMHG | SYSTOLIC BLOOD PRESSURE: 111 MMHG | WEIGHT: 150 LBS | HEIGHT: 70 IN | BODY MASS INDEX: 21.47 KG/M2

## 2018-03-21 DIAGNOSIS — G44.221 CHRONIC TENSION-TYPE HEADACHE, INTRACTABLE: ICD-10-CM

## 2018-03-21 DIAGNOSIS — G89.4 CHRONIC PAIN SYNDROME: ICD-10-CM

## 2018-03-21 DIAGNOSIS — M54.2 NECK PAIN: Primary | ICD-10-CM

## 2018-03-21 DIAGNOSIS — R11.0 CHRONIC NAUSEA: ICD-10-CM

## 2018-03-21 DIAGNOSIS — R56.9 GENERALIZED CONVULSIVE SEIZURE (HCC): ICD-10-CM

## 2018-03-21 DIAGNOSIS — G93.89 BRAIN MASS: ICD-10-CM

## 2018-03-21 DIAGNOSIS — R25.2 SPASM: ICD-10-CM

## 2018-03-21 DIAGNOSIS — R11.0 NAUSEA: ICD-10-CM

## 2018-03-21 DIAGNOSIS — G43.709 CHRONIC MIGRAINE WITHOUT AURA WITHOUT STATUS MIGRAINOSUS, NOT INTRACTABLE: ICD-10-CM

## 2018-03-21 PROCEDURE — 99214 OFFICE O/P EST MOD 30 MIN: CPT | Performed by: PSYCHIATRY & NEUROLOGY

## 2018-03-21 RX ORDER — ALPRAZOLAM 1 MG/1
TABLET ORAL
COMMUNITY
Start: 2018-02-22 | End: 2019-11-18

## 2018-03-21 RX ORDER — PROMETHAZINE HYDROCHLORIDE 25 MG/1
25 TABLET ORAL EVERY 6 HOURS PRN
Qty: 50 TABLET | Refills: 5 | Status: SHIPPED | OUTPATIENT
Start: 2018-03-21 | End: 2018-06-16

## 2018-03-21 RX ORDER — GABAPENTIN 600 MG/1
1200 TABLET ORAL 3 TIMES DAILY
Qty: 180 TABLET | Refills: 5
Start: 2018-03-21 | End: 2018-06-17 | Stop reason: HOSPADM

## 2018-03-21 NOTE — PROGRESS NOTES
Subjective:     Patient ID: Douglas Tan is a 42 y.o. male.    CC:   Chief Complaint   Patient presents with   • Seizures       HPI:   History of Present Illness  The following portions of the patient's history were reviewed and updated as appropriate: allergies, current medications, past family history, past medical history, past social history, past surgical history and problem list.     Reports having had surgery for frontal osteoma at [facility], doing better, missed follow up appt there, records requested. Continues on Keppra and gabapentin. Denies any seizures, headaches stable. Aches and pains stable.    Past Medical History:   Diagnosis Date   • Anxiety    • Arthritis    • Back pain, chronic    • Carpal tunnel syndrome    • Constipation    • Depression    • Diverticulosis    • Fibromyalgia    • Fractures    • GERD (gastroesophageal reflux disease)    • Head ache    • Hiatal hernia    • History of blood transfusion    • Hypertension     pt states no longer has it, was from anxiety attacks.    • Migraine    • Shoulder injury    • Tendonitis    • Tobacco abuse    • Traumatic brain injury        Past Surgical History:   Procedure Laterality Date   • EYE SURGERY Left 02/1999    reconstruction   • EYE SURGERY      reconstruction of left eye    • SPINAL CORD STIMULATOR IMPLANT N/A 5/16/2017    Procedure: INTRATHECAL CATHETER PLACEMENT ;  Surgeon: Aristides Zafar MD;  Location: [facility];  Service:    • TRACHEOSTOMY     • WISDOM TOOTH EXTRACTION         Social History     Social History   • Marital status:      Spouse name: N/A   • Number of children: N/A   • Years of education: N/A     Occupational History   • Not on file.     Social History Main Topics   • Smoking status: Current Every Day Smoker     Packs/day: 1.00     Years: 20.00     Types: Cigarettes   • Smokeless tobacco: Former User   • Alcohol use No      Comment: stopped drinking in 04/2015   • Drug use: No   • Sexual activity: Defer     Other  Topics Concern   • Not on file     Social History Narrative   • No narrative on file       Family History   Problem Relation Age of Onset   • Arthritis Mother    • Other Mother      Malignant neoplasm   • Arthritis Other    • Other Other      Malignant neoplasm   • Hypertension Other    • Alcohol abuse Father    • Arthritis Other    • Cancer Other         Review of Systems   Constitutional: Positive for appetite change, fatigue and unexpected weight change. Negative for chills and fever.   HENT: Positive for rhinorrhea. Negative for ear pain, hearing loss, nosebleeds and sore throat.    Eyes: Positive for pain, itching and visual disturbance. Negative for photophobia and discharge.   Respiratory: Negative for cough, chest tightness, shortness of breath and wheezing.    Cardiovascular: Positive for chest pain. Negative for palpitations and leg swelling.   Gastrointestinal: Positive for abdominal pain and nausea. Negative for blood in stool, constipation, diarrhea and vomiting.   Genitourinary: Negative for dysuria, frequency, hematuria and urgency.   Musculoskeletal: Positive for arthralgias, back pain, joint swelling, myalgias, neck pain and neck stiffness. Negative for gait problem.   Skin: Negative for rash and wound.   Allergic/Immunologic: Negative for environmental allergies and food allergies.   Neurological: Positive for dizziness, tremors, light-headedness, numbness and headaches. Negative for seizures, syncope, speech difficulty and weakness.   Hematological: Negative for adenopathy. Does not bruise/bleed easily.   Psychiatric/Behavioral: Positive for agitation, decreased concentration, dysphoric mood and suicidal ideas. Negative for confusion, hallucinations and sleep disturbance. The patient is nervous/anxious.         Objective:    Neurologic Exam     Mental Status   Oriented to person, place, and time.       Physical Exam   Constitutional: He is oriented to person, place, and time. He appears  well-developed and well-nourished.   Cardiovascular: Normal rate and regular rhythm.    Pulmonary/Chest: Effort normal.   Neurological: He is alert and oriented to person, place, and time. He has normal reflexes.   Psychiatric: He has a normal mood and affect. His behavior is normal. Thought content normal.       Assessment/Plan:       Douglas was seen today for seizures.    Diagnoses and all orders for this visit:    Neck pain  -     gabapentin (NEURONTIN) 600 MG tablet; Take 2 tablets by mouth 3 (Three) Times a Day.    Chronic tension-type headache, intractable    Brain mass    Generalized convulsive seizure        -      Continue Keppra.  Chronic migraine without aura without status migrainosus, not intractable  -     gabapentin (NEURONTIN) 600 MG tablet; Take 2 tablets by mouth 3 (Three) Times a Day.  -     promethazine (PHENERGAN) 25 MG tablet; Take 1 tablet by mouth Every 6 (Six) Hours As Needed for Nausea or Vomiting.    Chronic pain syndrome  -     gabapentin (NEURONTIN) 600 MG tablet; Take 2 tablets by mouth 3 (Three) Times a Day.    Spasm  -     gabapentin (NEURONTIN) 600 MG tablet; Take 2 tablets by mouth 3 (Three) Times a Day.    Nausea    Chronic nausea  -     promethazine (PHENERGAN) 25 MG tablet; Take 1 tablet by mouth Every 6 (Six) Hours As Needed for Nausea or Vomiting.         The patient has read and signed the [facility] Controlled Substance Contract.  I will continue to see patient for regular follow up appointments.  They are well controlled on their medication.  RODRIGO is updated every 3 months. The patient is aware of the potential for addiction and dependence.      Manuel Young MD  3/30/2018

## 2018-03-29 ENCOUNTER — OFFICE VISIT (OUTPATIENT)
Dept: ENT CLINIC | Age: 43
End: 2018-03-29
Payer: COMMERCIAL

## 2018-03-29 ENCOUNTER — PROCEDURE VISIT (OUTPATIENT)
Dept: AUDIOLOGY | Age: 43
End: 2018-03-29
Payer: COMMERCIAL

## 2018-03-29 VITALS
WEIGHT: 190.2 LBS | HEIGHT: 72 IN | BODY MASS INDEX: 25.76 KG/M2 | HEART RATE: 66 BPM | SYSTOLIC BLOOD PRESSURE: 129 MMHG | DIASTOLIC BLOOD PRESSURE: 96 MMHG

## 2018-03-29 DIAGNOSIS — H91.21 SUDDEN HEARING LOSS, RIGHT: Primary | ICD-10-CM

## 2018-03-29 DIAGNOSIS — H81.10 BENIGN PAROXYSMAL POSITIONAL VERTIGO, UNSPECIFIED LATERALITY: ICD-10-CM

## 2018-03-29 DIAGNOSIS — R42 DIZZINESS: ICD-10-CM

## 2018-03-29 DIAGNOSIS — H93.11 TINNITUS AURIUM, RIGHT: ICD-10-CM

## 2018-03-29 DIAGNOSIS — H81.311 VERTIGO, AURAL, RIGHT: ICD-10-CM

## 2018-03-29 DIAGNOSIS — J30.9 CHRONIC ALLERGIC RHINITIS, UNSPECIFIED SEASONALITY, UNSPECIFIED TRIGGER: ICD-10-CM

## 2018-03-29 DIAGNOSIS — H69.83 DYSFUNCTION OF BOTH EUSTACHIAN TUBES: Primary | ICD-10-CM

## 2018-03-29 DIAGNOSIS — H93.8X1 EAR FULLNESS, RIGHT: ICD-10-CM

## 2018-03-29 PROCEDURE — 92567 TYMPANOMETRY: CPT | Performed by: AUDIOLOGIST

## 2018-03-29 PROCEDURE — 92557 COMPREHENSIVE HEARING TEST: CPT | Performed by: AUDIOLOGIST

## 2018-03-29 PROCEDURE — 99203 OFFICE O/P NEW LOW 30 MIN: CPT | Performed by: PHYSICIAN ASSISTANT

## 2018-03-29 RX ORDER — FLUTICASONE PROPIONATE 50 MCG
2 SPRAY, SUSPENSION (ML) NASAL DAILY
Qty: 1 BOTTLE | Refills: 1 | Status: SHIPPED | OUTPATIENT
Start: 2018-03-29 | End: 2018-05-29 | Stop reason: CLARIF

## 2018-03-29 RX ORDER — GUAIFENESIN 600 MG/1
1200 TABLET, EXTENDED RELEASE ORAL 2 TIMES DAILY
COMMUNITY
End: 2018-04-17 | Stop reason: ALTCHOICE

## 2018-03-29 ASSESSMENT — ENCOUNTER SYMPTOMS
COUGH: 0
VOMITING: 0
NAUSEA: 0
RHINORRHEA: 1

## 2018-03-30 ENCOUNTER — TELEPHONE (OUTPATIENT)
Dept: ENT CLINIC | Age: 43
End: 2018-03-30

## 2018-03-30 DIAGNOSIS — R42 DIZZINESS: Primary | ICD-10-CM

## 2018-04-05 ENCOUNTER — HOSPITAL ENCOUNTER (OUTPATIENT)
Dept: GENERAL RADIOLOGY | Age: 43
Discharge: HOME | End: 2018-04-07
Payer: COMMERCIAL

## 2018-04-05 ENCOUNTER — HOSPITAL ENCOUNTER (OUTPATIENT)
Dept: MRI IMAGING | Age: 43
Discharge: HOME | End: 2018-04-07
Payer: COMMERCIAL

## 2018-04-05 ENCOUNTER — HOSPITAL ENCOUNTER (OUTPATIENT)
Dept: GENERAL RADIOLOGY | Age: 43
End: 2018-04-05
Payer: COMMERCIAL

## 2018-04-05 DIAGNOSIS — R42 DIZZINESS: ICD-10-CM

## 2018-04-05 PROCEDURE — 70200 X-RAY EXAM OF EYE SOCKETS: CPT

## 2018-04-05 PROCEDURE — 70553 MRI BRAIN STEM W/O & W/DYE: CPT

## 2018-04-05 PROCEDURE — A9576 INJ PROHANCE MULTIPACK: HCPCS | Performed by: RADIOLOGY

## 2018-04-05 PROCEDURE — 6360000004 HC RX CONTRAST MEDICATION: Performed by: RADIOLOGY

## 2018-04-05 RX ADMIN — GADOTERIDOL 18 ML: 279.3 INJECTION, SOLUTION INTRAVENOUS at 14:57

## 2018-04-09 ENCOUNTER — OFFICE VISIT (OUTPATIENT)
Dept: ENT CLINIC | Age: 43
End: 2018-04-09
Payer: COMMERCIAL

## 2018-04-09 ENCOUNTER — TELEPHONE (OUTPATIENT)
Dept: ENT CLINIC | Age: 43
End: 2018-04-09

## 2018-04-09 ENCOUNTER — PROCEDURE VISIT (OUTPATIENT)
Dept: AUDIOLOGY | Age: 43
End: 2018-04-09
Payer: COMMERCIAL

## 2018-04-09 VITALS
SYSTOLIC BLOOD PRESSURE: 140 MMHG | HEART RATE: 68 BPM | DIASTOLIC BLOOD PRESSURE: 91 MMHG | WEIGHT: 193.9 LBS | HEIGHT: 72 IN | BODY MASS INDEX: 26.26 KG/M2

## 2018-04-09 DIAGNOSIS — H91.91 UNILATERAL HEARING LOSS, RIGHT: Primary | ICD-10-CM

## 2018-04-09 DIAGNOSIS — R42 DIZZINESS: Primary | ICD-10-CM

## 2018-04-09 DIAGNOSIS — H93.11 TINNITUS AURIUM, RIGHT: ICD-10-CM

## 2018-04-09 DIAGNOSIS — H90.41 SENSORINEURAL HEARING LOSS (SNHL) OF RIGHT EAR WITH UNRESTRICTED HEARING OF LEFT EAR: ICD-10-CM

## 2018-04-09 DIAGNOSIS — R42 VERTIGO: ICD-10-CM

## 2018-04-09 PROCEDURE — 99213 OFFICE O/P EST LOW 20 MIN: CPT | Performed by: PHYSICIAN ASSISTANT

## 2018-04-09 PROCEDURE — 92552 PURE TONE AUDIOMETRY AIR: CPT | Performed by: AUDIOLOGIST

## 2018-04-09 PROCEDURE — 92567 TYMPANOMETRY: CPT | Performed by: AUDIOLOGIST

## 2018-04-09 RX ORDER — PREDNISONE 20 MG/1
60 TABLET ORAL DAILY
Qty: 30 TABLET | Refills: 0 | Status: SHIPPED | OUTPATIENT
Start: 2018-04-09 | End: 2018-04-19

## 2018-04-09 ASSESSMENT — ENCOUNTER SYMPTOMS
COUGH: 0
RHINORRHEA: 1
NAUSEA: 0
VOMITING: 0

## 2018-04-16 ENCOUNTER — PROCEDURE VISIT (OUTPATIENT)
Dept: AUDIOLOGY | Age: 43
End: 2018-04-16
Payer: COMMERCIAL

## 2018-04-16 ENCOUNTER — OFFICE VISIT (OUTPATIENT)
Dept: ENT CLINIC | Age: 43
End: 2018-04-16
Payer: COMMERCIAL

## 2018-04-16 VITALS
WEIGHT: 195.3 LBS | DIASTOLIC BLOOD PRESSURE: 88 MMHG | BODY MASS INDEX: 26.45 KG/M2 | HEIGHT: 72 IN | SYSTOLIC BLOOD PRESSURE: 128 MMHG | HEART RATE: 89 BPM

## 2018-04-16 DIAGNOSIS — H91.21 SUDDEN HEARING LOSS, RIGHT: ICD-10-CM

## 2018-04-16 DIAGNOSIS — H91.91 UNILATERAL HEARING LOSS, RIGHT: Primary | ICD-10-CM

## 2018-04-16 DIAGNOSIS — H93.8X1 EAR FULLNESS, RIGHT: ICD-10-CM

## 2018-04-16 DIAGNOSIS — H93.11 TINNITUS AURIUM, RIGHT: ICD-10-CM

## 2018-04-16 DIAGNOSIS — H81.21 VESTIBULAR NEURONITIS OF RIGHT EAR: Primary | ICD-10-CM

## 2018-04-16 DIAGNOSIS — H81.311 VERTIGO, AURAL, RIGHT: ICD-10-CM

## 2018-04-16 PROCEDURE — 92552 PURE TONE AUDIOMETRY AIR: CPT | Performed by: AUDIOLOGIST

## 2018-04-16 PROCEDURE — 99213 OFFICE O/P EST LOW 20 MIN: CPT | Performed by: OTOLARYNGOLOGY

## 2018-04-16 RX ORDER — PREDNISONE 20 MG/1
60 TABLET ORAL DAILY
Qty: 30 TABLET | Refills: 0 | Status: SHIPPED | OUTPATIENT
Start: 2018-04-16 | End: 2018-04-17 | Stop reason: SDUPTHER

## 2018-04-17 ENCOUNTER — OFFICE VISIT (OUTPATIENT)
Dept: FAMILY MEDICINE CLINIC | Age: 43
End: 2018-04-17
Payer: COMMERCIAL

## 2018-04-17 VITALS
DIASTOLIC BLOOD PRESSURE: 80 MMHG | WEIGHT: 194 LBS | HEIGHT: 72 IN | BODY MASS INDEX: 26.28 KG/M2 | HEART RATE: 87 BPM | RESPIRATION RATE: 16 BRPM | OXYGEN SATURATION: 98 % | TEMPERATURE: 98.2 F | SYSTOLIC BLOOD PRESSURE: 124 MMHG

## 2018-04-17 DIAGNOSIS — R03.0 ELEVATED BLOOD PRESSURE READING: ICD-10-CM

## 2018-04-17 DIAGNOSIS — H81.21 VESTIBULAR NEURONITIS OF RIGHT EAR: Primary | ICD-10-CM

## 2018-04-17 PROCEDURE — 99213 OFFICE O/P EST LOW 20 MIN: CPT | Performed by: NURSE PRACTITIONER

## 2018-04-17 ASSESSMENT — ENCOUNTER SYMPTOMS
WHEEZING: 0
SHORTNESS OF BREATH: 0
COUGH: 1
SPUTUM PRODUCTION: 1
NAUSEA: 0
DIARRHEA: 0
VOMITING: 0
CONSTIPATION: 0

## 2018-04-19 ENCOUNTER — HOSPITAL ENCOUNTER (OUTPATIENT)
Dept: AUDIOLOGY | Age: 43
Discharge: HOME | End: 2018-04-19
Payer: COMMERCIAL

## 2018-04-19 PROCEDURE — 92540 BASIC VESTIBULAR EVALUATION: CPT | Performed by: AUDIOLOGIST

## 2018-04-19 PROCEDURE — 92537 CALORIC VSTBLR TEST W/REC: CPT | Performed by: AUDIOLOGIST

## 2018-04-24 ENCOUNTER — TELEPHONE (OUTPATIENT)
Dept: ENT CLINIC | Age: 43
End: 2018-04-24

## 2018-04-25 ASSESSMENT — ENCOUNTER SYMPTOMS
HEARTBURN: 0
COUGH: 0
VOMITING: 0
SINUS PAIN: 1
DOUBLE VISION: 0
BLURRED VISION: 0
SHORTNESS OF BREATH: 0

## 2018-04-27 ENCOUNTER — OFFICE VISIT (OUTPATIENT)
Dept: ENT CLINIC | Age: 43
End: 2018-04-27
Payer: COMMERCIAL

## 2018-04-27 VITALS
WEIGHT: 193 LBS | HEART RATE: 110 BPM | SYSTOLIC BLOOD PRESSURE: 140 MMHG | DIASTOLIC BLOOD PRESSURE: 93 MMHG | BODY MASS INDEX: 26.18 KG/M2

## 2018-04-27 DIAGNOSIS — H81.09 MENIERE'S DISEASE (COCHLEAR HYDROPS), UNSPECIFIED LATERALITY: Primary | ICD-10-CM

## 2018-04-27 PROCEDURE — 99214 OFFICE O/P EST MOD 30 MIN: CPT | Performed by: OTOLARYNGOLOGY

## 2018-04-27 RX ORDER — DIAZEPAM 2 MG/1
2 TABLET ORAL EVERY 8 HOURS PRN
Qty: 42 TABLET | Refills: 0 | Status: SHIPPED | OUTPATIENT
Start: 2018-04-27 | End: 2018-05-11

## 2018-04-27 RX ORDER — METHYLPREDNISOLONE 4 MG/1
4 TABLET ORAL SEE ADMIN INSTRUCTIONS
Qty: 1 KIT | Refills: 0 | Status: SHIPPED | OUTPATIENT
Start: 2018-04-27 | End: 2018-05-03

## 2018-04-27 RX ORDER — HYDROCHLOROTHIAZIDE 12.5 MG/1
12.5 CAPSULE, GELATIN COATED ORAL EVERY MORNING
Qty: 30 CAPSULE | Refills: 0 | Status: SHIPPED | OUTPATIENT
Start: 2018-04-27 | End: 2018-06-12 | Stop reason: SDUPTHER

## 2018-04-30 ENCOUNTER — OFFICE VISIT (OUTPATIENT)
Dept: FAMILY MEDICINE CLINIC | Age: 43
End: 2018-04-30
Payer: COMMERCIAL

## 2018-04-30 VITALS
SYSTOLIC BLOOD PRESSURE: 122 MMHG | RESPIRATION RATE: 16 BRPM | HEART RATE: 106 BPM | BODY MASS INDEX: 25.73 KG/M2 | WEIGHT: 190 LBS | OXYGEN SATURATION: 98 % | TEMPERATURE: 98 F | HEIGHT: 72 IN | DIASTOLIC BLOOD PRESSURE: 74 MMHG

## 2018-04-30 DIAGNOSIS — R03.0 ELEVATED BLOOD PRESSURE READING: Primary | ICD-10-CM

## 2018-04-30 DIAGNOSIS — R42 VERTIGO: ICD-10-CM

## 2018-04-30 PROCEDURE — 99213 OFFICE O/P EST LOW 20 MIN: CPT | Performed by: NURSE PRACTITIONER

## 2018-04-30 ASSESSMENT — ENCOUNTER SYMPTOMS
WHEEZING: 0
VOMITING: 0
SHORTNESS OF BREATH: 0
COUGH: 1
NAUSEA: 0
DIARRHEA: 0
CONSTIPATION: 0
SPUTUM PRODUCTION: 1

## 2018-05-09 ENCOUNTER — OFFICE VISIT (OUTPATIENT)
Dept: ENT CLINIC | Age: 43
End: 2018-05-09
Payer: COMMERCIAL

## 2018-05-09 VITALS
HEART RATE: 86 BPM | DIASTOLIC BLOOD PRESSURE: 81 MMHG | WEIGHT: 193.2 LBS | SYSTOLIC BLOOD PRESSURE: 126 MMHG | BODY MASS INDEX: 26.2 KG/M2

## 2018-05-09 DIAGNOSIS — H83.2X9 VESTIBULAR DISEQUILIBRIUM, UNSPECIFIED LATERALITY: Primary | ICD-10-CM

## 2018-05-09 PROCEDURE — 99213 OFFICE O/P EST LOW 20 MIN: CPT | Performed by: OTOLARYNGOLOGY

## 2018-05-09 ASSESSMENT — ENCOUNTER SYMPTOMS
BLURRED VISION: 0
DOUBLE VISION: 0
HEARTBURN: 0
COUGH: 0
VOMITING: 0
SHORTNESS OF BREATH: 0

## 2018-05-10 ENCOUNTER — TELEPHONE (OUTPATIENT)
Dept: ENT CLINIC | Age: 43
End: 2018-05-10

## 2018-05-14 ASSESSMENT — ENCOUNTER SYMPTOMS
SHORTNESS OF BREATH: 0
HEARTBURN: 0
COUGH: 0
VOMITING: 0
BLURRED VISION: 0
DOUBLE VISION: 0

## 2018-05-29 ENCOUNTER — OFFICE VISIT (OUTPATIENT)
Dept: FAMILY MEDICINE CLINIC | Age: 43
End: 2018-05-29
Payer: COMMERCIAL

## 2018-05-29 VITALS
BODY MASS INDEX: 25.87 KG/M2 | DIASTOLIC BLOOD PRESSURE: 76 MMHG | WEIGHT: 191 LBS | HEIGHT: 72 IN | SYSTOLIC BLOOD PRESSURE: 132 MMHG | HEART RATE: 73 BPM | TEMPERATURE: 98.1 F | OXYGEN SATURATION: 98 % | RESPIRATION RATE: 16 BRPM

## 2018-05-29 DIAGNOSIS — R42 VERTIGO: Primary | ICD-10-CM

## 2018-05-29 DIAGNOSIS — R03.0 TRANSIENT ELEVATED BLOOD PRESSURE: ICD-10-CM

## 2018-05-29 PROCEDURE — 99213 OFFICE O/P EST LOW 20 MIN: CPT | Performed by: NURSE PRACTITIONER

## 2018-05-29 RX ORDER — DIAZEPAM 2 MG/1
2 TABLET ORAL EVERY 6 HOURS PRN
COMMUNITY
End: 2022-03-11 | Stop reason: SDUPTHER

## 2018-05-29 ASSESSMENT — ENCOUNTER SYMPTOMS
CONSTIPATION: 0
COUGH: 0
DIARRHEA: 0
SHORTNESS OF BREATH: 0
NAUSEA: 1
WHEEZING: 0
VOMITING: 0

## 2018-06-12 ENCOUNTER — TELEPHONE (OUTPATIENT)
Dept: ENT CLINIC | Age: 43
End: 2018-06-12

## 2018-06-12 ENCOUNTER — OFFICE VISIT (OUTPATIENT)
Dept: ENT CLINIC | Age: 43
End: 2018-06-12
Payer: COMMERCIAL

## 2018-06-12 VITALS
WEIGHT: 192.4 LBS | BODY MASS INDEX: 26.06 KG/M2 | DIASTOLIC BLOOD PRESSURE: 93 MMHG | HEIGHT: 72 IN | SYSTOLIC BLOOD PRESSURE: 134 MMHG | HEART RATE: 106 BPM

## 2018-06-12 DIAGNOSIS — H90.41 SENSORINEURAL HEARING LOSS (SNHL) OF RIGHT EAR WITH UNRESTRICTED HEARING OF LEFT EAR: ICD-10-CM

## 2018-06-12 DIAGNOSIS — H69.83 DYSFUNCTION OF BOTH EUSTACHIAN TUBES: ICD-10-CM

## 2018-06-12 DIAGNOSIS — H81.09 MENIERE'S DISEASE (COCHLEAR HYDROPS), UNSPECIFIED LATERALITY: Primary | ICD-10-CM

## 2018-06-12 DIAGNOSIS — H83.2X9 VESTIBULAR DISEQUILIBRIUM, UNSPECIFIED LATERALITY: ICD-10-CM

## 2018-06-12 PROCEDURE — 99213 OFFICE O/P EST LOW 20 MIN: CPT | Performed by: OTOLARYNGOLOGY

## 2018-06-12 RX ORDER — HYDROCHLOROTHIAZIDE 12.5 MG/1
12.5 CAPSULE, GELATIN COATED ORAL EVERY MORNING
Qty: 30 CAPSULE | Refills: 0 | Status: SHIPPED | OUTPATIENT
Start: 2018-06-12 | End: 2018-12-21 | Stop reason: CLARIF

## 2018-06-12 ASSESSMENT — ENCOUNTER SYMPTOMS
SHORTNESS OF BREATH: 0
BLURRED VISION: 0
VOMITING: 0
HEARTBURN: 0
COUGH: 0
DOUBLE VISION: 0

## 2018-06-16 ENCOUNTER — HOSPITAL ENCOUNTER (OUTPATIENT)
Facility: HOSPITAL | Age: 43
Setting detail: OBSERVATION
Discharge: HOME | End: 2018-06-17
Attending: EMERGENCY MEDICINE | Admitting: INTERNAL MEDICINE

## 2018-06-16 ENCOUNTER — APPOINTMENT (OUTPATIENT)
Dept: CT IMAGING | Facility: HOSPITAL | Age: 43
End: 2018-06-16

## 2018-06-16 ENCOUNTER — APPOINTMENT (OUTPATIENT)
Dept: GENERAL RADIOLOGY | Facility: HOSPITAL | Age: 43
End: 2018-06-16

## 2018-06-16 DIAGNOSIS — T42.6X2A GABAPENTIN OVERDOSE, INTENTIONAL SELF-HARM, INITIAL ENCOUNTER (HCC): Primary | ICD-10-CM

## 2018-06-16 PROBLEM — T42.6X1A GABAPENTIN OVERDOSE: Status: ACTIVE | Noted: 2018-06-16

## 2018-06-16 LAB
ALBUMIN SERPL-MCNC: 4.5 G/DL (ref 3.5–5)
ALBUMIN/GLOB SERPL: 1.9 G/DL (ref 1–2)
ALP SERPL-CCNC: 72 U/L (ref 38–126)
ALT SERPL W P-5'-P-CCNC: 24 U/L (ref 13–69)
AMPHET+METHAMPHET UR QL: NEGATIVE
AMPHETAMINES UR QL: NEGATIVE
ANION GAP SERPL CALCULATED.3IONS-SCNC: 10.7 MMOL/L (ref 10–20)
APAP SERPL-MCNC: 18 MCG/ML
AST SERPL-CCNC: 24 U/L (ref 15–46)
BARBITURATES UR QL SCN: POSITIVE
BASOPHILS # BLD AUTO: 0.09 10*3/MM3 (ref 0–0.2)
BASOPHILS NFR BLD AUTO: 0.8 % (ref 0–2.5)
BENZODIAZ UR QL SCN: POSITIVE
BILIRUB SERPL-MCNC: 0.3 MG/DL (ref 0.2–1.3)
BUN BLD-MCNC: 6 MG/DL (ref 7–20)
BUN/CREAT SERPL: 6 (ref 6.3–21.9)
BUPRENORPHINE SERPL-MCNC: NEGATIVE NG/ML
CALCIUM SPEC-SCNC: 9.1 MG/DL (ref 8.4–10.2)
CANNABINOIDS SERPL QL: NEGATIVE
CHLORIDE SERPL-SCNC: 110 MMOL/L (ref 98–107)
CK SERPL-CCNC: 92 U/L (ref 30–170)
CO2 SERPL-SCNC: 29 MMOL/L (ref 26–30)
COCAINE UR QL: NEGATIVE
CREAT BLD-MCNC: 1 MG/DL (ref 0.6–1.3)
DEPRECATED RDW RBC AUTO: 47.3 FL (ref 37–54)
EOSINOPHIL # BLD AUTO: 0.23 10*3/MM3 (ref 0–0.7)
EOSINOPHIL NFR BLD AUTO: 2.2 % (ref 0–7)
ERYTHROCYTE [DISTWIDTH] IN BLOOD BY AUTOMATED COUNT: 13.9 % (ref 11.5–14.5)
ETHANOL BLD-MCNC: <10 MG/DL
ETHANOL UR QL: <0.01 %
GFR SERPL CREATININE-BSD FRML MDRD: 83 ML/MIN/1.73
GLOBULIN UR ELPH-MCNC: 2.4 GM/DL
GLUCOSE BLD-MCNC: 88 MG/DL (ref 74–98)
GLUCOSE BLDC GLUCOMTR-MCNC: 85 MG/DL (ref 70–130)
HCT VFR BLD AUTO: 40.2 % (ref 42–52)
HGB BLD-MCNC: 13.8 G/DL (ref 14–18)
HOLD SPECIMEN: NORMAL
HOLD SPECIMEN: NORMAL
IMM GRANULOCYTES # BLD: 0.03 10*3/MM3 (ref 0–0.06)
IMM GRANULOCYTES NFR BLD: 0.3 % (ref 0–0.6)
LYMPHOCYTES # BLD AUTO: 3.39 10*3/MM3 (ref 0.6–3.4)
LYMPHOCYTES NFR BLD AUTO: 31.9 % (ref 10–50)
MAGNESIUM SERPL-MCNC: 2.1 MG/DL (ref 1.6–2.3)
MCH RBC QN AUTO: 31.6 PG (ref 27–31)
MCHC RBC AUTO-ENTMCNC: 34.3 G/DL (ref 30–37)
MCV RBC AUTO: 92 FL (ref 80–94)
METHADONE UR QL SCN: NEGATIVE
MONOCYTES # BLD AUTO: 0.49 10*3/MM3 (ref 0–0.9)
MONOCYTES NFR BLD AUTO: 4.6 % (ref 0–12)
NEUTROPHILS # BLD AUTO: 6.41 10*3/MM3 (ref 2–6.9)
NEUTROPHILS NFR BLD AUTO: 60.2 % (ref 37–80)
NRBC BLD MANUAL-RTO: 0 /100 WBC (ref 0–0)
OPIATES UR QL: NEGATIVE
OXYCODONE UR QL SCN: NEGATIVE
PCP UR QL SCN: NEGATIVE
PLATELET # BLD AUTO: 297 10*3/MM3 (ref 130–400)
PMV BLD AUTO: 9.3 FL (ref 6–12)
POTASSIUM BLD-SCNC: 3.7 MMOL/L (ref 3.5–5.1)
PROPOXYPH UR QL: NEGATIVE
PROT SERPL-MCNC: 6.9 G/DL (ref 6.3–8.2)
RBC # BLD AUTO: 4.37 10*6/MM3 (ref 4.7–6.1)
SALICYLATES SERPL-MCNC: <1 MG/DL (ref 2.8–20)
SODIUM BLD-SCNC: 146 MMOL/L (ref 137–145)
TRICYCLICS UR QL SCN: NEGATIVE
TROPONIN I SERPL-MCNC: <0.012 NG/ML (ref 0–0.03)
WBC NRBC COR # BLD: 10.64 10*3/MM3 (ref 4.8–10.8)
WHOLE BLOOD HOLD SPECIMEN: NORMAL
WHOLE BLOOD HOLD SPECIMEN: NORMAL

## 2018-06-16 PROCEDURE — G0378 HOSPITAL OBSERVATION PER HR: HCPCS

## 2018-06-16 PROCEDURE — 84484 ASSAY OF TROPONIN QUANT: CPT | Performed by: NURSE PRACTITIONER

## 2018-06-16 PROCEDURE — 80306 DRUG TEST PRSMV INSTRMNT: CPT | Performed by: PHYSICIAN ASSISTANT

## 2018-06-16 PROCEDURE — 85025 COMPLETE CBC W/AUTO DIFF WBC: CPT | Performed by: PHYSICIAN ASSISTANT

## 2018-06-16 PROCEDURE — 80307 DRUG TEST PRSMV CHEM ANLYZR: CPT | Performed by: PHYSICIAN ASSISTANT

## 2018-06-16 PROCEDURE — 80053 COMPREHEN METABOLIC PANEL: CPT | Performed by: PHYSICIAN ASSISTANT

## 2018-06-16 PROCEDURE — 99219 PR INITIAL OBSERVATION CARE/DAY 50 MINUTES: CPT | Performed by: INTERNAL MEDICINE

## 2018-06-16 PROCEDURE — 82962 GLUCOSE BLOOD TEST: CPT

## 2018-06-16 PROCEDURE — 82550 ASSAY OF CK (CPK): CPT | Performed by: NURSE PRACTITIONER

## 2018-06-16 PROCEDURE — 99285 EMERGENCY DEPT VISIT HI MDM: CPT

## 2018-06-16 PROCEDURE — 83735 ASSAY OF MAGNESIUM: CPT | Performed by: NURSE PRACTITIONER

## 2018-06-16 PROCEDURE — 73130 X-RAY EXAM OF HAND: CPT

## 2018-06-16 PROCEDURE — 70450 CT HEAD/BRAIN W/O DYE: CPT

## 2018-06-16 RX ORDER — ONDANSETRON 4 MG/1
4 TABLET, ORALLY DISINTEGRATING ORAL EVERY 6 HOURS PRN
Status: DISCONTINUED | OUTPATIENT
Start: 2018-06-16 | End: 2018-06-17 | Stop reason: HOSPADM

## 2018-06-16 RX ORDER — SODIUM CHLORIDE 9 MG/ML
100 INJECTION, SOLUTION INTRAVENOUS CONTINUOUS
Status: DISCONTINUED | OUTPATIENT
Start: 2018-06-17 | End: 2018-06-17

## 2018-06-16 RX ORDER — SODIUM CHLORIDE 0.9 % (FLUSH) 0.9 %
10 SYRINGE (ML) INJECTION AS NEEDED
Status: DISCONTINUED | OUTPATIENT
Start: 2018-06-16 | End: 2018-06-17 | Stop reason: HOSPADM

## 2018-06-16 RX ORDER — ONDANSETRON 2 MG/ML
4 INJECTION INTRAMUSCULAR; INTRAVENOUS EVERY 6 HOURS PRN
Status: DISCONTINUED | OUTPATIENT
Start: 2018-06-16 | End: 2018-06-17 | Stop reason: HOSPADM

## 2018-06-16 RX ORDER — LEVETIRACETAM 500 MG/1
500 TABLET ORAL EVERY 12 HOURS SCHEDULED
Status: DISCONTINUED | OUTPATIENT
Start: 2018-06-17 | End: 2018-06-17 | Stop reason: HOSPADM

## 2018-06-16 RX ORDER — SODIUM CHLORIDE 0.9 % (FLUSH) 0.9 %
1-10 SYRINGE (ML) INJECTION AS NEEDED
Status: DISCONTINUED | OUTPATIENT
Start: 2018-06-16 | End: 2018-06-17 | Stop reason: HOSPADM

## 2018-06-16 RX ORDER — ONDANSETRON 4 MG/1
4 TABLET, FILM COATED ORAL EVERY 6 HOURS PRN
Status: DISCONTINUED | OUTPATIENT
Start: 2018-06-16 | End: 2018-06-17 | Stop reason: HOSPADM

## 2018-06-16 RX ADMIN — SODIUM CHLORIDE 1000 ML: 9 INJECTION, SOLUTION INTRAVENOUS at 19:54

## 2018-06-16 NOTE — ED PROVIDER NOTES
"Subjective   History of Present Illness  This is a 42-year-old male who has a history of depression, fibromyalgia, traumatic brain injury, anxiety, chronic back pain, hypertension, who is brought in by EMS due to suicidal ideations.  His mother contacted EMS because he had called her and told her that he wanted to kill himself.  MS indicated they found a bottle of gabapentin 600 mg capsules that had been filled on June 13 180 and today there are 7 left in it.  The patient indicates that he did take all of them but he's not exactly sure which day.  He did drink alcohol yesterday but he's not sure that he drink today.  He has ecchymosis and swelling on his right hand which she states he punched a wall today.  He tells me that he is depressed and that he did want to kill himself because he is depressed.  He states that now he is fine however.  He denies chest pain or shortness of breath.  He denies nausea or vomiting.  He does not believe that he has fallen or hit his head.  He does have some abrasions on the dorsal aspect of his right foot.  No other obvious injuries.  Review of Systems   All other systems reviewed and are negative.      Past Medical History:   Diagnosis Date   • Anxiety    • Arthritis    • Back pain, chronic    • Carpal tunnel syndrome    • Constipation    • Depression    • Diverticulosis    • Fibromyalgia    • Fractures    • GERD (gastroesophageal reflux disease)    • Head ache    • Hiatal hernia    • History of blood transfusion    • Hypertension     pt states no longer has it, was from anxiety attacks.    • Migraine    • Shoulder injury    • Tendonitis    • Tobacco abuse    • Traumatic brain injury        Allergies   Allergen Reactions   • Shellfish-Derived Products Arrhythmia   • Amitriptyline Other (See Comments)     Made him mean     • Buspirone Other (See Comments)     Didn't \"feel right\"  Didn't \"feel right\"   • Duloxetine Hcl Other (See Comments)     Urinary retention  Urinary retention   • " Penicillins Rash   • Venlafaxine Nausea And Vomiting       Past Surgical History:   Procedure Laterality Date   • EYE SURGERY Left 02/1999    reconstruction   • EYE SURGERY      reconstruction of left eye    • SPINAL CORD STIMULATOR IMPLANT N/A 5/16/2017    Procedure: INTRATHECAL CATHETER PLACEMENT ;  Surgeon: Aristides Zafar MD;  Location: [facility];  Service:    • TRACHEOSTOMY     • WISDOM TOOTH EXTRACTION         Family History   Problem Relation Age of Onset   • Arthritis Mother    • Other Mother         Malignant neoplasm   • Arthritis Other    • Other Other         Malignant neoplasm   • Hypertension Other    • Alcohol abuse Father    • Arthritis Other    • Cancer Other        Social History     Social History   • Marital status:      Social History Main Topics   • Smoking status: Current Every Day Smoker     Packs/day: 1.00     Years: 20.00     Types: Cigarettes   • Smokeless tobacco: Former User   • Alcohol use Yes      Comment: PT REPORTS DRINKING 6 BEERS TODAY   • Drug use: No   • Sexual activity: Defer     Other Topics Concern   • Not on file           Objective   Physical Exam   Constitutional: He appears well-developed and well-nourished.   HENT:   Head: Normocephalic and atraumatic.   Cardiovascular: Normal rate, regular rhythm, normal heart sounds and intact distal pulses.  Exam reveals no gallop.    No murmur heard.  Pulmonary/Chest: Effort normal and breath sounds normal.   Musculoskeletal: Normal range of motion.   Nursing note and vitals reviewed.      Procedures           ED Course  ED Course as of Jun 16 2234   Sat Jun 16, 2018 2056 Urine drug screen is positive for benzodiazepines as well as barbiturates.  Alcohol level is unremarkable.  CBC and CMP are unremarkable.  He is elevated to 146 and patient has had 1 L of fluids.  CK and troponin and magnesium are all normal.  I spoke to poison control earlier recommended that the patient be observed at least overnight since we have no  idea exactly what he took or when.  Patient is somewhat belligerent in the emergency department and he did calm down when security assisted.  [CM]   2114 The hospitalist accepted the patient to the ICU for evaluation overnight.  The patient has began to punch the bed, he has pulled his IV out, he has pulled the monitor off, he has become belligerent to both myself into the  who was with us.  I discussed with him that it would be important for him to be admitted for further evaluation and he indicated he was not going to do that and then became belligerent.  He then attempted to throw items in the room and I have went ahead and called the local police department and at this time they will talk with the patient and will determine what her next course of action will be.  [CM]   2138 The [place] police came and spoke with the patient.  He has indicated that he wants to be admitted and that he will began to act appropriately.  He understands that if he chooses not to act appropriately at the place will be called again and at that point he'll be taken to [facility].  Be admitted to the ICU for monitoring and he will have security with him.  [CM]      ED Course User Index  [CM] NICOLE Cotton      Patient is now resting quietly.  Will be transferred to the ICU.    Tylenol level is 18.  Poison control called back and indicated that it would be important to recheck his AST and ALTs in the morning, they are currently normal.  Dr. Kelley is aware of this and had planned on rechecking labs.        [facility]      Final diagnoses:   Gabapentin overdose, intentional self-harm, initial encounter            NICOLE Cotton  06/16/18 1962

## 2018-06-17 VITALS
BODY MASS INDEX: 21.83 KG/M2 | DIASTOLIC BLOOD PRESSURE: 85 MMHG | HEIGHT: 70 IN | WEIGHT: 152.5 LBS | TEMPERATURE: 98 F | HEART RATE: 80 BPM | RESPIRATION RATE: 15 BRPM | SYSTOLIC BLOOD PRESSURE: 118 MMHG | OXYGEN SATURATION: 94 %

## 2018-06-17 LAB
ALBUMIN SERPL-MCNC: 3.8 G/DL (ref 3.5–5)
ALBUMIN/GLOB SERPL: 1.7 G/DL (ref 1–2)
ALP SERPL-CCNC: 58 U/L (ref 38–126)
ALT SERPL W P-5'-P-CCNC: 25 U/L (ref 13–69)
ANION GAP SERPL CALCULATED.3IONS-SCNC: 4.7 MMOL/L (ref 10–20)
AST SERPL-CCNC: 23 U/L (ref 15–46)
BILIRUB SERPL-MCNC: 0.3 MG/DL (ref 0.2–1.3)
BUN BLD-MCNC: 7 MG/DL (ref 7–20)
BUN/CREAT SERPL: 7.8 (ref 6.3–21.9)
CALCIUM SPEC-SCNC: 8.5 MG/DL (ref 8.4–10.2)
CHLORIDE SERPL-SCNC: 111 MMOL/L (ref 98–107)
CO2 SERPL-SCNC: 28 MMOL/L (ref 26–30)
CREAT BLD-MCNC: 0.9 MG/DL (ref 0.6–1.3)
DEPRECATED RDW RBC AUTO: 46.8 FL (ref 37–54)
ERYTHROCYTE [DISTWIDTH] IN BLOOD BY AUTOMATED COUNT: 14 % (ref 11.5–14.5)
GFR SERPL CREATININE-BSD FRML MDRD: 93 ML/MIN/1.73
GLOBULIN UR ELPH-MCNC: 2.3 GM/DL
GLUCOSE BLD-MCNC: 86 MG/DL (ref 74–98)
HCT VFR BLD AUTO: 38.2 % (ref 42–52)
HGB BLD-MCNC: 13.1 G/DL (ref 14–18)
MCH RBC QN AUTO: 31.3 PG (ref 27–31)
MCHC RBC AUTO-ENTMCNC: 34.3 G/DL (ref 30–37)
MCV RBC AUTO: 91.2 FL (ref 80–94)
PLATELET # BLD AUTO: 287 10*3/MM3 (ref 130–400)
PMV BLD AUTO: 10.3 FL (ref 6–12)
POTASSIUM BLD-SCNC: 3.7 MMOL/L (ref 3.5–5.1)
PROT SERPL-MCNC: 6.1 G/DL (ref 6.3–8.2)
RBC # BLD AUTO: 4.19 10*6/MM3 (ref 4.7–6.1)
SODIUM BLD-SCNC: 140 MMOL/L (ref 137–145)
WBC NRBC COR # BLD: 10.58 10*3/MM3 (ref 4.8–10.8)

## 2018-06-17 PROCEDURE — 25010000002 ENOXAPARIN PER 10 MG: Performed by: INTERNAL MEDICINE

## 2018-06-17 PROCEDURE — G0378 HOSPITAL OBSERVATION PER HR: HCPCS

## 2018-06-17 PROCEDURE — 85027 COMPLETE CBC AUTOMATED: CPT | Performed by: INTERNAL MEDICINE

## 2018-06-17 PROCEDURE — 99217 PR OBSERVATION CARE DISCHARGE MANAGEMENT: CPT | Performed by: INTERNAL MEDICINE

## 2018-06-17 PROCEDURE — 96361 HYDRATE IV INFUSION ADD-ON: CPT

## 2018-06-17 PROCEDURE — 80053 COMPREHEN METABOLIC PANEL: CPT | Performed by: INTERNAL MEDICINE

## 2018-06-17 PROCEDURE — 96372 THER/PROPH/DIAG INJ SC/IM: CPT

## 2018-06-17 PROCEDURE — 96360 HYDRATION IV INFUSION INIT: CPT

## 2018-06-17 RX ORDER — ALPRAZOLAM 0.5 MG/1
0.5 TABLET ORAL ONCE
Status: COMPLETED | OUTPATIENT
Start: 2018-06-17 | End: 2018-06-17

## 2018-06-17 RX ORDER — SODIUM CHLORIDE 450 MG/100ML
100 INJECTION, SOLUTION INTRAVENOUS CONTINUOUS
Status: DISCONTINUED | OUTPATIENT
Start: 2018-06-17 | End: 2018-06-17 | Stop reason: HOSPADM

## 2018-06-17 RX ORDER — OXYCODONE AND ACETAMINOPHEN 10; 325 MG/1; MG/1
1 TABLET ORAL EVERY 8 HOURS PRN
COMMUNITY
End: 2019-11-18

## 2018-06-17 RX ADMIN — ONDANSETRON 4 MG: 4 TABLET, FILM COATED ORAL at 00:20

## 2018-06-17 RX ADMIN — SODIUM CHLORIDE 100 ML/HR: 4.5 INJECTION, SOLUTION INTRAVENOUS at 01:45

## 2018-06-17 RX ADMIN — SODIUM CHLORIDE 100 ML/HR: 9 INJECTION, SOLUTION INTRAVENOUS at 00:35

## 2018-06-17 RX ADMIN — ENOXAPARIN SODIUM 40 MG: 40 INJECTION SUBCUTANEOUS at 00:20

## 2018-06-17 RX ADMIN — ALPRAZOLAM 0.5 MG: 0.5 TABLET ORAL at 01:48

## 2018-06-17 NOTE — DISCHARGE SUMMARY
[facility] HOSPITALIST   DISCHARGE SUMMARY      Name:  Douglas Tan   Age:  42 y.o.  Sex:  male  :  1975  MRN:  [id number]   Visit Number:  [id number]  Primary Care Physician:  No Known Provider  Date of Discharge:  2018  Admission Date:  2018      Discharge Diagnosis:     1. Intentional drug overdose on Gabapentin, present on admission  2. Elevated tylenol level with normal LFTs  3. Suicidal ideations  4. Acute metabolic encephalopathy, due to # 1  5. Depression/Anxiety  6. History of TBI  7. Chronic migraines    Active Hospital Problems (** Indicates Principal Problem)    Diagnosis Date Noted   • Gabapentin overdose [T42.6X1A] 2018      Resolved Hospital Problems    Diagnosis Date Noted Date Resolved   No resolved problems to display.         Presenting Problem/History of Present Illness:    Gabapentin overdose, intentional self-harm, initial encounter [T42.6X2A]         Hospital Course:    Patient is a 42-year-old male with past medical history traumatic brain injury, depression, insomnia, chronic migraine headaches and fibromyalgia who was brought in via EMS for suicide ideation.  He apparently took 20 gabapentin yesterday morning.  He had actually told his mother he wanted to kill himself.  He denies suicidal ideation now.  He apparently punched a wall as well yesterday, x-ray showed no acute fracture.  He also had mildly elevated Tylenol level.  His LFTs were normal.  Poison control was called and recommended just observation.  LFTs were normal again today.  Patient was given IV fluids overnight.  Urine drug screen was positive for benzodiazepine and barbiturates.  [facility] was pulled and reviewed, he is prescribed these medications.  He claims he takes Percocet as well, this was on the [place] as well.  Patient was belligerent in the emergency room, so he was taken up to the ICU with security.  [place] police did comment speak to the patient and it was determined  that if he acts belligerent and attempts to become a danger to others he will be taken to correction.  He has been calm since then.    Behavioral health came and saw the patient.  They recommended continuing follow-up with his outpatient counselor.  They did not feel he needed to be admitted to inpatient psychiatric unit.  Patient is stable medically.  He denies any chest pressure, shortness breath, nausea, vomiting, or pain.  He is afraid that if he does not take his Percocet he will go into withdrawal.  He is exhibiting no signs or symptoms of withdrawal at this time.  He is stable to be discharged home.    Procedures Performed:           Consults:     Consults     No orders found for last 30 day(s).          Pertinent Test Results:     Lab Results (all)     Procedure Component Value Units Date/Time    CBC (No Diff) [[id number]]  (Abnormal) Collected:  06/17/18 0413    Specimen:  Blood Updated:  06/17/18 0537     WBC 10.58 10*3/mm3      RBC 4.19 (L) 10*6/mm3      Hemoglobin 13.1 (L) g/dL      Hematocrit 38.2 (L) %      MCV 91.2 fL      MCH 31.3 (H) pg      MCHC 34.3 g/dL      RDW 14.0 %      RDW-SD 46.8 fl      MPV 10.3 fL      Platelets 287 10*3/mm3     Comprehensive Metabolic Panel [[id number]]  (Abnormal) Collected:  06/17/18 0413    Specimen:  Blood Updated:  06/17/18 0504     Glucose 86 mg/dL      BUN 7 mg/dL      Creatinine 0.90 mg/dL      Sodium 140 mmol/L      Potassium 3.7 mmol/L      Chloride 111 (H) mmol/L      CO2 28.0 mmol/L      Calcium 8.5 mg/dL      Total Protein 6.1 (L) g/dL      Albumin 3.80 g/dL      ALT (SGPT) 25 U/L      AST (SGOT) 23 U/L      Alkaline Phosphatase 58 U/L      Total Bilirubin 0.3 mg/dL      eGFR Non African Amer 93 mL/min/1.73      Globulin 2.3 gm/dL      A/G Ratio 1.7 g/dL      BUN/Creatinine Ratio 7.8     Anion Gap 4.7 (L) mmol/L     Narrative:       GFR Normal >60  Chronic Kidney Disease <60  Kidney Failure <15    [place] Draw [[id number]] Collected:  06/16/18 1954    Specimen:   Blood Updated:  06/16/18 2100    Narrative:       The following orders were created for panel order [place] Draw.  Procedure                               Abnormality         Status                     ---------                               -----------         ------                     Light Blue Top[[id number]]                                   Final result               Lavender Top[[id number]]                                     Final result               Gold Top - SST[[id number]]                                   Final result               Green Top (No Gel)[[id number]]                               Final result                 Please view results for these tests on the individual orders.    POC Glucose Once [[id number]]  (Normal) Collected:  06/16/18 2056    Specimen:  Blood Updated:  06/16/18 2100     Glucose 85 mg/dL      Comment: Serial Number: [id number]:  [id number]       Light Blue Top [[id number]] Collected:  06/16/18 1954    Specimen:  Blood Updated:  06/16/18 2100     Extra Tube hold for add-on     Comment: Auto resulted       Lavender Top [[id number]] Collected:  06/16/18 1954    Specimen:  Blood Updated:  06/16/18 2100     Extra Tube hold for add-on     Comment: Auto resulted       Gold Top - SST [[id number]] Collected:  06/16/18 1954    Specimen:  Blood Updated:  06/16/18 2100     Extra Tube Hold for add-ons.     Comment: Auto resulted.       Green Top (No Gel) [[id number]] Collected:  06/16/18 1954    Specimen:  Blood Updated:  06/16/18 2100     Extra Tube Hold for add-ons.     Comment: Auto resulted.       Urine Drug Screen - Urine, Clean Catch [[id number]]  (Abnormal) Collected:  06/16/18 2022    Specimen:  Urine from Urine, Clean Catch Updated:  06/16/18 2039     THC, Screen, Urine Negative     Phencyclidine (PCP), Urine Negative     Cocaine Screen, Urine Negative     Methamphetamine, Urine Negative     Opiate Screen Negative     Amphetamine Screen, Urine Negative     Benzodiazepine Screen, Urine  Positive (A)     Tricyclic Antidepressants Screen Negative     Methadone Screen, Urine Negative     Barbiturates Screen, Urine Positive (A)     Oxycodone Screen, Urine Negative     Propoxyphene Screen Negative     Buprenorphine, Screen, Urine Negative    Narrative:       Limitations of this procedure include the possibility of false positives due to interfering substances in the urine sample. Clinical data should be correlated with any questionable result. Positive results should be considered Presumptive Positive until results are confirmed with another methodology such as HPLC or GCMS.    Troponin [[id number]]  (Normal) Collected:  06/16/18 1954    Specimen:  Blood Updated:  06/16/18 2025     Troponin I <0.012 ng/mL     Narrative:       Normal Patient Upper Reference Limit (URL) (99th Percentile)=0.03 ng/mL   Non-AMI Illness Reference Limit=0.03-0.11 ng/mL   AMI Confirmation=0.12 ng/mL and above    CK [[id number]]  (Normal) Collected:  06/16/18 1954    Specimen:  Blood Updated:  06/16/18 2025     Creatine Kinase 92 U/L     Comprehensive Metabolic Panel [[id number]]  (Abnormal) Collected:  06/16/18 1954    Specimen:  Blood Updated:  06/16/18 2025     Glucose 88 mg/dL      BUN 6 (L) mg/dL      Creatinine 1.00 mg/dL      Sodium 146 (H) mmol/L      Potassium 3.7 mmol/L      Chloride 110 (H) mmol/L      CO2 29.0 mmol/L      Calcium 9.1 mg/dL      Total Protein 6.9 g/dL      Albumin 4.50 g/dL      ALT (SGPT) 24 U/L      AST (SGOT) 24 U/L      Alkaline Phosphatase 72 U/L      Total Bilirubin 0.3 mg/dL      eGFR Non African Amer 83 mL/min/1.73      Globulin 2.4 gm/dL      A/G Ratio 1.9 g/dL      BUN/Creatinine Ratio 6.0 (L)     Anion Gap 10.7 mmol/L     Narrative:       GFR Normal >60  Chronic Kidney Disease <60  Kidney Failure <15    Acetaminophen Level [[id number]]  (Normal) Collected:  06/16/18 1954    Specimen:  Blood Updated:  06/16/18 2025     Acetaminophen 18.0 mcg/mL     Narrative:       Toxic = Greater than 150  mcg/mL    Ethanol [[id number]] Collected:  06/16/18 1954    Specimen:  Blood Updated:  06/16/18 2025     Ethanol <10 mg/dL      Ethanol % <0.010 %     Narrative:       This result is for medical use only and should not be used for forensic purposes.    Salicylate Level [[id number]]  (Abnormal) Collected:  06/16/18 1954    Specimen:  Blood Updated:  06/16/18 2025     Salicylate <1.0 (L) mg/dL     Magnesium [[id number]]  (Normal) Collected:  06/16/18 1954    Specimen:  Blood Updated:  06/16/18 2025     Magnesium 2.1 mg/dL     CBC & Differential [[id number]] Collected:  06/16/18 1954    Specimen:  Blood Updated:  06/16/18 2003    Narrative:       The following orders were created for panel order CBC & Differential.  Procedure                               Abnormality         Status                     ---------                               -----------         ------                     CBC Auto Differential[[id number]]        Abnormal            Final result                 Please view results for these tests on the individual orders.    CBC Auto Differential [[id number]]  (Abnormal) Collected:  06/16/18 1954    Specimen:  Blood Updated:  06/16/18 2003     WBC 10.64 10*3/mm3      RBC 4.37 (L) 10*6/mm3      Hemoglobin 13.8 (L) g/dL      Hematocrit 40.2 (L) %      MCV 92.0 fL      MCH 31.6 (H) pg      MCHC 34.3 g/dL      RDW 13.9 %      RDW-SD 47.3 fl      MPV 9.3 fL      Platelets 297 10*3/mm3      Neutrophil % 60.2 %      Lymphocyte % 31.9 %      Monocyte % 4.6 %      Eosinophil % 2.2 %      Basophil % 0.8 %      Immature Grans % 0.3 %      Neutrophils, Absolute 6.41 10*3/mm3      Lymphocytes, Absolute 3.39 10*3/mm3      Monocytes, Absolute 0.49 10*3/mm3      Eosinophils, Absolute 0.23 10*3/mm3      Basophils, Absolute 0.09 10*3/mm3      Immature Grans, Absolute 0.03 10*3/mm3      nRBC 0.0 /100 WBC           Imaging Results (all)     Procedure Component Value Units Date/Time    XR Hand 3+ View Right [[id number]] Collected:   "06/16/18 1951     Updated:  06/16/18 1952    Narrative:       FINAL REPORT    CLINICAL HISTORY:  patient states that they have right hand pain after punching  something    FINDINGS:  There is no acute fracture or dislocation.  There is an  age-indeterminate 5 mm radiopaque foreign body fourth digit.      Impression:       No acute fracture    Authenticated by Antonietta Darling MD on 06/16/2018 07:51:30 PM    CT Head Without Contrast [[id number]] Collected:  06/16/18 1948     Updated:  06/16/18 1949    Narrative:       FINAL REPORT    TECHNIQUE:  Multiple contiguous transaxial slices through the head were  obtained without the intravenous administration of contrast.    CLINICAL HISTORY:  Confusion    FINDINGS:  The brain parenchyma is normal in morphology and attenuation  without acute infarct, hemorrhage or mass effect. The fourth,  third and lateral ventricles are normal in size and position.  There is no skull fracture. No sinus air fluid level is present.  There are multiple old facial fractures.      Impression:       No acute intracranial abnormality    Authenticated by Antonietta Darling MD on 06/16/2018 07:48:56 PM          Condition on Discharge:      Stable    Vital Signs:    /79   Pulse 84   Temp 98 °F (36.7 °C)   Resp 23   Ht 177.8 cm (70\")   Wt 69.2 kg (152 lb 8 oz)   SpO2 94%   BMI 21.88 kg/m²     Physical Exam:      General Appearance:    Alert, cooperative, in no acute distress   Head:    Normocephalic, without obvious abnormality, atraumatic   Eyes:            Lids and lashes normal, conjunctivae and sclerae normal, no   icterus, no pallor, corneas clear, PERRLA   Ears:    Ears appear intact with no abnormalities noted   Throat:   No oral lesions, no thrush, oral mucosa moist   Neck:   No adenopathy, supple, trachea midline, no thyromegaly, no   carotid bruit, no JVD   Back:     No kyphosis present, no scoliosis present, no skin lesions,      erythema or scars, no tenderness to percussion " or                   palpation,   range of motion normal   Lungs:     Clear to auscultation,respirations regular, even and                  unlabored    Heart:    Regular rhythm and normal rate, normal S1 and S2, no            murmur, no gallop, no rub, no click   Chest Wall:    No abnormalities observed   Abdomen:     Normal bowel sounds, no masses, no organomegaly, soft        non-tender, non-distended, no guarding, no rebound                tenderness   Rectal:     Deferred   Extremities:   Moves all extremities well, no edema, no cyanosis, no             redness   Pulses:   Pulses palpable and equal bilaterally   Skin:   No bleeding, bruising or rash   Lymph nodes:   No palpable adenopathy   Neurologic:   Cranial nerves 2 - 12 grossly intact, sensation intact, DTR       present and equal bilaterally       Discharge Disposition:    Home or Self Care    Discharge Medication:       Discharge Medications      Continue These Medications      Instructions Start Date   ALPRAZolam 1 MG tablet  Commonly known as:  XANAX   No dose, route, or frequency recorded.      butalbital-acetaminophen-caffeine -40 MG per tablet  Commonly known as:  FIORICET, ESGIC   1 tablet, Oral, Every 4 Hours PRN      lurasidone 40 MG tablet tablet  Commonly known as:  LATUDA   40 mg, Oral, Daily      meloxicam 7.5 MG tablet  Commonly known as:  MOBIC   7.5 mg, Oral, Daily      oxyCODONE-acetaminophen  MG per tablet  Commonly known as:  PERCOCET   1 tablet, Oral, Every 8 Hours PRN      traZODone 50 MG tablet  Commonly known as:  DESYREL   50 mg, Oral, Nightly         Stop These Medications    gabapentin 600 MG tablet  Commonly known as:  NEURONTIN     levETIRAcetam 500 MG tablet  Commonly known as:  KEPPRA     NUEDEXTA PO            Discharge Diet:      regular    Activity at Discharge:      as tolerated    Follow-up Appointments:    Future Appointments  Date Time Provider Department Center   9/17/2018 1:45 PM Manuel Young MD  [facility] None         Test Results Pending at Discharge:           Alonso Coreas DO  06/17/18  9:04 AM

## 2018-06-17 NOTE — PLAN OF CARE
Problem: Suicide Risk (Adult)  Goal: Identify Related Risk Factors and Signs and Symptoms   06/17/18 0046   Suicide Risk (Adult)   Related Risk Factors (Suicide Risk) substance use   Signs and Symptoms (Suicide Risk) anxiety

## 2018-06-17 NOTE — PROGRESS NOTES
Case Management Discharge Note    Final Note: Dc Home     Destination     No service coordination in this encounter.      Durable Medical Equipment     No service coordination in this encounter.      Dialysis/Infusion     No service coordination in this encounter.      Home Medical Care     No service coordination in this encounter.      Social Care     No service coordination in this encounter.             Final Discharge Disposition Code: 01 - home or self-care
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
## 2018-06-17 NOTE — H&P
[facility]  HISTORY AND PHYSICAL    Primary Care Physician: No Known Provider    Subjective     Chief Complaint:    Chief Complaint   Patient presents with   • Suicidal   • Alcohol Intoxication       History of Present Illness:     I have reviewed labs/imaging/records from this hospitalization, including ER staff to establish a comprehensive understanding of this patient's clinical issues, as well as to establish plan of care appropriately.     Patient is a 42 year old male with medical history of traumatic brain injury, depression, insomnia, chronic migraine headaches, and fibromyalgia, who was brought in by EMS for suicidal ideation. Patient is alert and oriented person only during my evaluation and not able to answer many of my questions, therefore, history corroborated from ER endorsement and chart review. Patient states he does not know why his mother asked EMS to bring him to the hospital and that there is nothing wrong with him. He does report taking 20 pills of Gabapentin this morning but states he took them 'just because' and that he was not trying to hurt himself. He is denying any suicidal ideation to me at this time. Per chart review, patient was brought in by EMS because he actually called his mother and told her that he wanted to kill himself. When EMS arrived, they found a bottle of gabapentin, that was filled on 06/13, 180 tablets, 600mg dosage. There were only 7 left in it. It is unclear if patient took all of them today or over the last 3 days. He apparently punched a wall earlier today and has some bruising from it on his right hand. At the time of my examination, patient has no complaints to offer. He denies blurry vision or double vision, denies dizziness, lightheadedness, chest pain, shortness of breath, headache, palpitations, abdominal pain, diarrhea or dysuria.     His vitals were within normal limits on arrival. Urine drug screen positive for  Benzodiazepines and Barbiturates, both of which he does take at home. CMP within normal range, normal H/H and white blood cell count. Alcohol, salicylate and CPK within normal limits. Tylenol level slightly high 18. CT head obtained in the ER is negative and XR of the hand was negative for acute fracture, there was a 5 mm foreign body in the fourth digit. The ER provider did contact poison control who did not recommend any intervention other than observation overnight for gabapentin overdose and for the elevated tylenol level, they recommended repeating LFTs in the morning.     While in the ER, prior to my encounter, patient was apparently very belligerent, so security was called to bedside to monitor. He then started to punch the bed and pulled out his IV. He was also trying to throw things around in the room which prompted a phone call to the local police department. The [place] police did come and speak to the patient and it was determined that if he acts belligerent and attempts to becomes a danger to others, he will be taken to halfway.     Review of Systems   1. Constitutional: Negative for fever. Negative for chills, diaphoresis, fatigue and unexpected weight change.   2. HENT: Negative for congestion and hearing loss.   3. Eyes: Negative for redness and visual disturbance.   4. Respiratory: negative for shortness of breath. Negative for chest pain . Negative for cough and chest tightness.   5. Cardiovascular: Negative for chest pain and palpitations.   6. Gastrointestinal: Negative for abdominal distention, abdominal pain and blood in stool.   7. Endocrine: Negative for cold intolerance and heat intolerance.   8. Genitourinary: Negative for difficulty urinating, dysuria and frequency.   9. Musculoskeletal: Negative for arthralgias, back pain and myalgias.   10. Skin: Negative for color change, rash and wound.   11. Neurological: Negative for syncope, weakness and headaches.   12. Hematological: Negative for  "adenopathy. Does not bruise/bleed easily.   13. Psychiatric/Behavioral: Negative for confusion. The patient is not nervous/anxious.     Past Medical History:   Past Medical History:   Diagnosis Date   • Anxiety    • Arthritis    • Back pain, chronic    • Carpal tunnel syndrome    • Constipation    • Depression    • Diverticulosis    • Fibromyalgia    • Fractures    • GERD (gastroesophageal reflux disease)    • Head ache    • Hiatal hernia    • History of blood transfusion    • Hypertension     pt states no longer has it, was from anxiety attacks.    • Migraine    • Shoulder injury    • Tendonitis    • Tobacco abuse    • Traumatic brain injury        Past Surgical History:  Past Surgical History:   Procedure Laterality Date   • EYE SURGERY Left 02/1999    reconstruction   • EYE SURGERY      reconstruction of left eye    • SPINAL CORD STIMULATOR IMPLANT N/A 5/16/2017    Procedure: INTRATHECAL CATHETER PLACEMENT ;  Surgeon: Aristides Zafar MD;  Location: [facility];  Service:    • TRACHEOSTOMY     • WISDOM TOOTH EXTRACTION         Family History: family history includes Alcohol abuse in his father; Arthritis in his mother, other, and other; Cancer in his other; Hypertension in his other; Other in his mother and other.    Social History:  reports that he has been smoking Cigarettes.  He has a 20.00 pack-year smoking history. He has quit using smokeless tobacco. He reports that he drinks alcohol. He reports that he does not use drugs.    Medications:    (Not in a hospital admission)    Allergies:  Allergies   Allergen Reactions   • Shellfish-Derived Products Arrhythmia   • Amitriptyline Other (See Comments)     Made him mean     • Buspirone Other (See Comments)     Didn't \"feel right\"  Didn't \"feel right\"   • Duloxetine Hcl Other (See Comments)     Urinary retention  Urinary retention   • Penicillins Rash   • Venlafaxine Nausea And Vomiting         Objective     Physical Exam:  Vital Signs: /77   Pulse 89   " "Temp 98.8 °F (37.1 °C) (Oral)   Resp 17   Ht 177.8 cm (70\")   Wt 74.8 kg (165 lb)   SpO2 99%   BMI 23.68 kg/m²      Physical Exam:     General Appearance:   Alert, cooperative with me, in no acute distress, does seem to have impulse control issues, using many curse words      Head:   Normocephalic, old left frontal craniotomy scar, atraumatic.     Eyes:       Normal, conjunctivae and sclerae, no icterus, no pallor, corneas clear, PERRLA        Throat:   Oral mucosa dry      Neck:  No adenopathy, supple, trachea midline, no thyromegaly, no carotid bruit, no JVD, old tracheostomy scar     Back:   No CVA tenderness on Percussion.     Lungs:    Clear to auscultation and fair air movement noted.      Heart:   Regular rhythm and normal rate, normal S1 and S2.       Abdomen:   Normal bowel sounds, no masses, no organomegaly, soft non-tender, non-distended, no guarding, no rebound tenderness        Extremities:  Moves all extremities, no edema, no cyanosis, no redness, small abrasion on right hand     Pulses:  Pulses palpable and equal bilaterally but weak.     Skin:  No bleeding, bruising or rash        Neurologic:  Cranial nerves grossly intact, move all extremities, delayed responses to questions          Results Review:  Lab Results (last 7 days)     Procedure Component Value Units Date/Time    Boling Draw [[id number]] Collected:  06/16/18 1954    Specimen:  Blood Updated:  06/16/18 2100    Narrative:       The following orders were created for panel order Bol[place] Draw.  Procedure                               Abnormality         Status                     ---------                               -----------         ------                     Light Blue Top[[id number]]                                   Final result               Lavender Top[[id number]]                                     Final result               Gold Top - SST[[id number]]                                   Final result               Green Top (No " Gel)[[id number]]                               Final result                 Please view results for these tests on the individual orders.    POC Glucose Once [[id number]]  (Normal) Collected:  06/16/18 2056    Specimen:  Blood Updated:  06/16/18 2100     Glucose 85 mg/dL      Comment: Serial Number: [id number]:  [id number]       Light Blue Top [[id number]] Collected:  06/16/18 1954    Specimen:  Blood Updated:  06/16/18 2100     Extra Tube hold for add-on     Comment: Auto resulted       Lavender Top [[id number]] Collected:  06/16/18 1954    Specimen:  Blood Updated:  06/16/18 2100     Extra Tube hold for add-on     Comment: Auto resulted       Gold Top - SST [[id number]] Collected:  06/16/18 1954    Specimen:  Blood Updated:  06/16/18 2100     Extra Tube Hold for add-ons.     Comment: Auto resulted.       Green Top (No Gel) [[id number]] Collected:  06/16/18 1954    Specimen:  Blood Updated:  06/16/18 2100     Extra Tube Hold for add-ons.     Comment: Auto resulted.       Urine Drug Screen - Urine, Clean Catch [[id number]]  (Abnormal) Collected:  06/16/18 2022    Specimen:  Urine from Urine, Clean Catch Updated:  06/16/18 2039     THC, Screen, Urine Negative     Phencyclidine (PCP), Urine Negative     Cocaine Screen, Urine Negative     Methamphetamine, Urine Negative     Opiate Screen Negative     Amphetamine Screen, Urine Negative     Benzodiazepine Screen, Urine Positive (A)     Tricyclic Antidepressants Screen Negative     Methadone Screen, Urine Negative     Barbiturates Screen, Urine Positive (A)     Oxycodone Screen, Urine Negative     Propoxyphene Screen Negative     Buprenorphine, Screen, Urine Negative    Narrative:       Limitations of this procedure include the possibility of false positives due to interfering substances in the urine sample. Clinical data should be correlated with any questionable result. Positive results should be considered Presumptive Positive until results are confirmed with another  methodology such as HPLC or GCMS.    Troponin [[id number]]  (Normal) Collected:  06/16/18 1954    Specimen:  Blood Updated:  06/16/18 2025     Troponin I <0.012 ng/mL     Narrative:       Normal Patient Upper Reference Limit (URL) (99th Percentile)=0.03 ng/mL   Non-AMI Illness Reference Limit=0.03-0.11 ng/mL   AMI Confirmation=0.12 ng/mL and above    CK [[id number]]  (Normal) Collected:  06/16/18 1954    Specimen:  Blood Updated:  06/16/18 2025     Creatine Kinase 92 U/L     Comprehensive Metabolic Panel [[id number]]  (Abnormal) Collected:  06/16/18 1954    Specimen:  Blood Updated:  06/16/18 2025     Glucose 88 mg/dL      BUN 6 (L) mg/dL      Creatinine 1.00 mg/dL      Sodium 146 (H) mmol/L      Potassium 3.7 mmol/L      Chloride 110 (H) mmol/L      CO2 29.0 mmol/L      Calcium 9.1 mg/dL      Total Protein 6.9 g/dL      Albumin 4.50 g/dL      ALT (SGPT) 24 U/L      AST (SGOT) 24 U/L      Alkaline Phosphatase 72 U/L      Total Bilirubin 0.3 mg/dL      eGFR Non African Amer 83 mL/min/1.73      Globulin 2.4 gm/dL      A/G Ratio 1.9 g/dL      BUN/Creatinine Ratio 6.0 (L)     Anion Gap 10.7 mmol/L     Narrative:       GFR Normal >60  Chronic Kidney Disease <60  Kidney Failure <15    Acetaminophen Level [[id number]]  (Normal) Collected:  06/16/18 1954    Specimen:  Blood Updated:  06/16/18 2025     Acetaminophen 18.0 mcg/mL     Narrative:       Toxic = Greater than 150 mcg/mL    Ethanol [[id number]] Collected:  06/16/18 1954    Specimen:  Blood Updated:  06/16/18 2025     Ethanol <10 mg/dL      Ethanol % <0.010 %     Narrative:       This result is for medical use only and should not be used for forensic purposes.    Salicylate Level [[id number]]  (Abnormal) Collected:  06/16/18 1954    Specimen:  Blood Updated:  06/16/18 2025     Salicylate <1.0 (L) mg/dL     Magnesium [[id number]]  (Normal) Collected:  06/16/18 1954    Specimen:  Blood Updated:  06/16/18 2025     Magnesium 2.1 mg/dL     CBC & Differential [[id number]]  Collected:  06/16/18 1954    Specimen:  Blood Updated:  06/16/18 2003    Narrative:       The following orders were created for panel order CBC & Differential.  Procedure                               Abnormality         Status                     ---------                               -----------         ------                     CBC Auto Differential[[id number]]        Abnormal            Final result                 Please view results for these tests on the individual orders.    CBC Auto Differential [[id number]]  (Abnormal) Collected:  06/16/18 1954    Specimen:  Blood Updated:  06/16/18 2003     WBC 10.64 10*3/mm3      RBC 4.37 (L) 10*6/mm3      Hemoglobin 13.8 (L) g/dL      Hematocrit 40.2 (L) %      MCV 92.0 fL      MCH 31.6 (H) pg      MCHC 34.3 g/dL      RDW 13.9 %      RDW-SD 47.3 fl      MPV 9.3 fL      Platelets 297 10*3/mm3      Neutrophil % 60.2 %      Lymphocyte % 31.9 %      Monocyte % 4.6 %      Eosinophil % 2.2 %      Basophil % 0.8 %      Immature Grans % 0.3 %      Neutrophils, Absolute 6.41 10*3/mm3      Lymphocytes, Absolute 3.39 10*3/mm3      Monocytes, Absolute 0.49 10*3/mm3      Eosinophils, Absolute 0.23 10*3/mm3      Basophils, Absolute 0.09 10*3/mm3      Immature Grans, Absolute 0.03 10*3/mm3      nRBC 0.0 /100 WBC         Imaging Results (last 72 hours)     Procedure Component Value Units Date/Time    XR Hand 3+ View Right [[id number]] Collected:  06/16/18 1951     Updated:  06/16/18 1952    Narrative:       FINAL REPORT    CLINICAL HISTORY:  patient states that they have right hand pain after punching  something    FINDINGS:  There is no acute fracture or dislocation.  There is an  age-indeterminate 5 mm radiopaque foreign body fourth digit.      Impression:       No acute fracture    Authenticated by Antonietta Darling MD on 06/16/2018 07:51:30 PM    CT Head Without Contrast [[id number]] Collected:  06/16/18 1948     Updated:  06/16/18 1949    Narrative:       FINAL  REPORT    TECHNIQUE:  Multiple contiguous transaxial slices through the head were  obtained without the intravenous administration of contrast.    CLINICAL HISTORY:  Confusion    FINDINGS:  The brain parenchyma is normal in morphology and attenuation  without acute infarct, hemorrhage or mass effect. The fourth,  third and lateral ventricles are normal in size and position.  There is no skull fracture. No sinus air fluid level is present.  There are multiple old facial fractures.      Impression:       No acute intracranial abnormality    Authenticated by Antonietta Darling MD on 06/16/2018 07:48:56 PM        EKG:     I have personally reviewed and interpreted available lab data, radiology studies and ECG obtained at time of admission.     Assessment / Plan     Assessment/Problem List:   Active Problems:    * No active hospital problems. *    1. Intentional drug overdose on Gabapentin, present on admission  2. Elevated tylenol level with normal LFTs  3. Suicidal ideations  4. Acute metabolic encephalopathy, due to # 1  5. Depression/Anxiety  6. History of TBI  7. Chronic migraines    Plan:  Admit to the ICU overnight.   Continue with IVF.   Hold all sedatives and anxiolytics for the night.   Will need to be evaluated by behavioral health tomorrow morning if he is more alert and able to answer questions appropriately.   Will repeat labs in the morning.   Security to be at his bedside.     Details were discussed with the patient    Anticipated stay is less than 2 midnights.    Elivra Miller MD 06/16/18 10:40 PM    Dictated using Dragon.

## 2018-09-17 ENCOUNTER — OFFICE VISIT (OUTPATIENT)
Dept: NEUROLOGY | Facility: CLINIC | Age: 43
End: 2018-09-17

## 2018-09-17 VITALS
DIASTOLIC BLOOD PRESSURE: 85 MMHG | WEIGHT: 155 LBS | HEIGHT: 70 IN | SYSTOLIC BLOOD PRESSURE: 137 MMHG | BODY MASS INDEX: 22.19 KG/M2 | HEART RATE: 90 BPM

## 2018-09-17 DIAGNOSIS — G44.221 CHRONIC TENSION-TYPE HEADACHE, INTRACTABLE: Primary | ICD-10-CM

## 2018-09-17 DIAGNOSIS — M54.81 CERVICO-OCCIPITAL NEURALGIA: ICD-10-CM

## 2018-09-17 DIAGNOSIS — R45.851 SUICIDAL IDEATION: ICD-10-CM

## 2018-09-17 PROCEDURE — 99214 OFFICE O/P EST MOD 30 MIN: CPT | Performed by: PSYCHIATRY & NEUROLOGY

## 2018-09-17 RX ORDER — PROMETHAZINE HYDROCHLORIDE 25 MG/1
TABLET ORAL
COMMUNITY
Start: 2018-08-24 | End: 2019-11-18

## 2018-09-17 RX ORDER — GABAPENTIN 600 MG/1
TABLET ORAL
COMMUNITY
Start: 2018-08-24 | End: 2018-09-17 | Stop reason: SDUPTHER

## 2018-09-17 RX ORDER — HYDROXYZINE PAMOATE 50 MG/1
CAPSULE ORAL
COMMUNITY
Start: 2018-08-24 | End: 2019-11-18

## 2018-09-17 RX ORDER — BUPROPION HYDROCHLORIDE 150 MG/1
TABLET ORAL
COMMUNITY
Start: 2018-09-13 | End: 2019-11-18

## 2018-09-17 RX ORDER — GABAPENTIN 600 MG/1
TABLET ORAL
Qty: 18 TABLET | Refills: 0 | OUTPATIENT
Start: 2018-09-17 | End: 2019-11-18

## 2018-09-17 RX ORDER — OXYCODONE HYDROCHLORIDE 10 MG/1
TABLET ORAL
COMMUNITY
Start: 2018-09-10 | End: 2019-11-18

## 2018-09-17 NOTE — PROGRESS NOTES
Subjective:     Patient ID: Douglas Tan is a 42 y.o. male.    CC:   Chief Complaint   Patient presents with   • Neck Pain       HPI:   History of Present Illness  The following portions of the patient's history were reviewed and updated as appropriate: allergies, current medications, past family history, past medical history, past social history, past surgical history and problem list.     Patient was admitted in [facility] in June with an intentional gabapentin OD, suicidal ideation. He denies any seizures, has not been taking Keppra. He sees a counselor at [facility]. He has refilled gabapentin since then, feels that he needs to stay on it. He is pain management. Takes Fioricet prescribed by [facility].      Past Medical History:   Diagnosis Date   • Anxiety    • Arthritis    • Back pain, chronic    • Carpal tunnel syndrome    • Constipation    • Depression    • Diverticulosis    • Fibromyalgia    • Fractures    • GERD (gastroesophageal reflux disease)    • Head ache    • Hiatal hernia    • History of blood transfusion    • Hypertension     pt states no longer has it, was from anxiety attacks.    • Migraine    • Shoulder injury    • Tendonitis    • Tobacco abuse    • Traumatic brain injury (CMS/[facility])        Past Surgical History:   Procedure Laterality Date   • EYE SURGERY Left 02/1999    reconstruction   • EYE SURGERY      reconstruction of left eye    • SPINAL CORD STIMULATOR IMPLANT N/A 5/16/2017    Procedure: INTRATHECAL CATHETER PLACEMENT ;  Surgeon: Aristides Zafar MD;  Location: [facility] OR;  Service:    • TRACHEOSTOMY     • WISDOM TOOTH EXTRACTION         Social History     Social History   • Marital status:      Spouse name: N/A   • Number of children: N/A   • Years of education: N/A     Occupational History   • Not on file.     Social History Main Topics   • Smoking status: Current Every Day Smoker     Packs/day: 1.00     Years: 20.00     Types: Cigarettes   • Smokeless tobacco: Former User    • Alcohol use Yes      Comment: PT REPORTS DRINKING 6 BEERS TODAY   • Drug use: No   • Sexual activity: Defer     Other Topics Concern   • Not on file     Social History Narrative   • No narrative on file       Family History   Problem Relation Age of Onset   • Arthritis Mother    • Other Mother         Malignant neoplasm   • Arthritis Other    • Other Other         Malignant neoplasm   • Hypertension Other    • Alcohol abuse Father    • Arthritis Other    • Cancer Other         Review of Systems   Constitutional: Positive for appetite change, fatigue and unexpected weight change. Negative for chills and fever.   HENT: Positive for rhinorrhea. Negative for ear pain, hearing loss, nosebleeds and sore throat.    Eyes: Positive for photophobia and pain. Negative for discharge, itching and visual disturbance.   Respiratory: Negative for cough, chest tightness, shortness of breath and wheezing.    Cardiovascular: Negative for chest pain, palpitations and leg swelling.   Gastrointestinal: Positive for abdominal pain, nausea and vomiting. Negative for blood in stool, constipation and diarrhea.   Genitourinary: Negative for dysuria, frequency, hematuria and urgency.   Musculoskeletal: Positive for arthralgias, back pain, joint swelling, myalgias, neck pain and neck stiffness. Negative for gait problem.   Skin: Negative for rash and wound.   Allergic/Immunologic: Negative for environmental allergies and food allergies.   Neurological: Positive for dizziness, tremors, light-headedness, numbness and headaches. Negative for seizures, syncope, speech difficulty and weakness.   Hematological: Negative for adenopathy. Does not bruise/bleed easily.   Psychiatric/Behavioral: Positive for behavioral problems, confusion, decreased concentration, dysphoric mood and sleep disturbance. Negative for agitation, hallucinations and suicidal ideas. The patient is nervous/anxious.         Objective:    Neurologic Exam     Mental Status    Oriented to person, place, and time.       Physical Exam   Constitutional: He is oriented to person, place, and time. He appears well-developed and well-nourished.   Cardiovascular: Normal rate and regular rhythm.    Pulmonary/Chest: Effort normal.   Neurological: He is alert and oriented to person, place, and time. He has normal reflexes.   Psychiatric: He has a normal mood and affect. His behavior is normal. Thought content normal.       Assessment/Plan:       Douglas was seen today for neck pain.    Diagnoses and all orders for this visit:    Chronic tension-type headache, intractable  -     gabapentin (NEURONTIN) 600 MG tablet; One tid x 3 days, bid x 3 days, 1/2 bid x 3 days and stop    Cervico-occipital neuralgia  -     gabapentin (NEURONTIN) 600 MG tablet; One tid x 3 days, bid x 3 days, 1/2 bid x 3 days and stop    Suicidal ideation        -     We need a note from his mental health provider that he is psychologically stable to resume gabapentin.       The patient has read and signed the [facility] Controlled Substance Contract.  I will continue to see patient for regular follow up appointments.  They are well controlled on their medication.  RODRIGO is updated every 3 months. The patient is aware of the potential for addiction and dependence.      Manuel Young MD  9/17/2018

## 2018-09-21 DIAGNOSIS — G43.709 CHRONIC MIGRAINE WITHOUT AURA WITHOUT STATUS MIGRAINOSUS, NOT INTRACTABLE: ICD-10-CM

## 2018-09-21 DIAGNOSIS — M54.81 CERVICO-OCCIPITAL NEURALGIA: ICD-10-CM

## 2018-09-21 DIAGNOSIS — G44.221 CHRONIC TENSION-TYPE HEADACHE, INTRACTABLE: ICD-10-CM

## 2018-09-21 DIAGNOSIS — R11.0 CHRONIC NAUSEA: ICD-10-CM

## 2018-09-24 RX ORDER — GABAPENTIN 600 MG/1
TABLET ORAL
Qty: 180 TABLET | Refills: 0 | OUTPATIENT
Start: 2018-09-24

## 2018-09-24 RX ORDER — PROMETHAZINE HYDROCHLORIDE 25 MG/1
TABLET ORAL
Qty: 50 TABLET | Refills: 0 | Status: SHIPPED | OUTPATIENT
Start: 2018-09-24 | End: 2019-11-18

## 2018-10-22 RX ORDER — PROMETHAZINE HYDROCHLORIDE 25 MG/1
TABLET ORAL
Qty: 50 TABLET | Refills: 5 | Status: SHIPPED | OUTPATIENT
Start: 2018-10-22 | End: 2019-11-18

## 2018-12-21 ENCOUNTER — OFFICE VISIT (OUTPATIENT)
Dept: FAMILY MEDICINE CLINIC | Age: 43
End: 2018-12-21
Payer: COMMERCIAL

## 2018-12-21 ENCOUNTER — HOSPITAL ENCOUNTER (OUTPATIENT)
Age: 43
Discharge: HOME | End: 2018-12-23
Payer: COMMERCIAL

## 2018-12-21 VITALS
DIASTOLIC BLOOD PRESSURE: 78 MMHG | OXYGEN SATURATION: 99 % | HEIGHT: 72 IN | BODY MASS INDEX: 24.92 KG/M2 | HEART RATE: 84 BPM | TEMPERATURE: 98 F | SYSTOLIC BLOOD PRESSURE: 122 MMHG | RESPIRATION RATE: 14 BRPM | WEIGHT: 184 LBS

## 2018-12-21 DIAGNOSIS — R19.7 DIARRHEA, UNSPECIFIED TYPE: ICD-10-CM

## 2018-12-21 DIAGNOSIS — R10.31 ABDOMINAL PAIN, RLQ (RIGHT LOWER QUADRANT): Primary | ICD-10-CM

## 2018-12-21 DIAGNOSIS — R39.11 URINARY HESITANCY: ICD-10-CM

## 2018-12-21 DIAGNOSIS — K92.1 BLOOD IN STOOL: ICD-10-CM

## 2018-12-21 DIAGNOSIS — G43.809 VESTIBULAR MIGRAINE: ICD-10-CM

## 2018-12-21 DIAGNOSIS — Z63.4 GRIEF AT LOSS OF CHILD: ICD-10-CM

## 2018-12-21 DIAGNOSIS — M23.91 KNEE LOCKING, RIGHT: ICD-10-CM

## 2018-12-21 DIAGNOSIS — H81.09 MENIERE'S DISEASE, UNSPECIFIED LATERALITY: ICD-10-CM

## 2018-12-21 DIAGNOSIS — F43.21 GRIEF AT LOSS OF CHILD: ICD-10-CM

## 2018-12-21 LAB
BILIRUBIN, POC: NORMAL
BLOOD URINE, POC: NORMAL
CLARITY, POC: NORMAL
COLOR, POC: NORMAL
GLUCOSE URINE, POC: NORMAL
KETONES, POC: NORMAL
LEUKOCYTE EST, POC: NORMAL
NITRITE, POC: NORMAL
PH, POC: 6
PROTEIN, POC: NORMAL
SPECIFIC GRAVITY, POC: 1.03
UROBILINOGEN, POC: NORMAL

## 2018-12-21 PROCEDURE — 81002 URINALYSIS NONAUTO W/O SCOPE: CPT | Performed by: NURSE PRACTITIONER

## 2018-12-21 PROCEDURE — 87591 N.GONORRHOEAE DNA AMP PROB: CPT

## 2018-12-21 PROCEDURE — 87491 CHLMYD TRACH DNA AMP PROBE: CPT

## 2018-12-21 PROCEDURE — 99214 OFFICE O/P EST MOD 30 MIN: CPT | Performed by: NURSE PRACTITIONER

## 2018-12-21 RX ORDER — SUMATRIPTAN 100 MG/1
TABLET, FILM COATED ORAL
COMMUNITY
Start: 2018-09-20

## 2018-12-21 RX ORDER — BUSPIRONE HYDROCHLORIDE 10 MG/1
10 TABLET ORAL 3 TIMES DAILY PRN
COMMUNITY
Start: 2018-11-20 | End: 2020-02-07 | Stop reason: SDUPTHER

## 2018-12-21 SDOH — SOCIAL STABILITY - SOCIAL INSECURITY: DISSAPEARANCE AND DEATH OF FAMILY MEMBER: Z63.4

## 2018-12-21 ASSESSMENT — ENCOUNTER SYMPTOMS
ABDOMINAL PAIN: 1
COUGH: 0
ABDOMINAL DISTENTION: 0
DIARRHEA: 1
WHEEZING: 0
NAUSEA: 0
SHORTNESS OF BREATH: 0
CONSTIPATION: 0
BLOOD IN STOOL: 1
VOMITING: 0

## 2018-12-21 NOTE — PROGRESS NOTES
migraine  -continue to follow with [facility]    Knee locking, right  -     [address], DO    Diarrhea, unspecified type  -     External Referral To Gastroenterology    Blood in stool  -     External Referral To Gastroenterology    Grief at loss of child  -will self refer to Destini Odell at [facility]    Urinary hesitancy  -     POCT Urinalysis no Micro  -     C. Trachomatis / N.  Gonorrhoeae, DNA        Greater than 25  Minutes was spent with patient and more than 50% of the time was spent face to facecounseling and educating regarding diagnoses

## 2018-12-24 LAB
CHLAMYDIA TRACHOMATIS AMPLIFIED DET: NORMAL
N GONORRHOEAE AMPLIFIED DET: NORMAL

## 2018-12-27 DIAGNOSIS — R10.31 ABDOMINAL PAIN, RLQ (RIGHT LOWER QUADRANT): ICD-10-CM

## 2019-01-25 DIAGNOSIS — M25.561 ACUTE PAIN OF RIGHT KNEE: Primary | ICD-10-CM

## 2019-01-28 ENCOUNTER — OFFICE VISIT (OUTPATIENT)
Dept: ORTHOPEDIC SURGERY | Age: 44
End: 2019-01-28
Payer: COMMERCIAL

## 2019-01-28 VITALS — BODY MASS INDEX: 25.73 KG/M2 | HEIGHT: 72 IN | WEIGHT: 190 LBS | TEMPERATURE: 98 F

## 2019-01-28 DIAGNOSIS — S83.241A ACUTE MEDIAL MENISCUS TEAR, RIGHT, INITIAL ENCOUNTER: Primary | ICD-10-CM

## 2019-01-28 PROCEDURE — 99203 OFFICE O/P NEW LOW 30 MIN: CPT | Performed by: ORTHOPAEDIC SURGERY

## 2019-02-06 ENCOUNTER — HOSPITAL ENCOUNTER (OUTPATIENT)
Dept: MRI IMAGING | Age: 44
Discharge: HOME | End: 2019-02-08
Payer: COMMERCIAL

## 2019-02-06 DIAGNOSIS — S83.241A ACUTE MEDIAL MENISCUS TEAR, RIGHT, INITIAL ENCOUNTER: ICD-10-CM

## 2019-02-06 PROCEDURE — 73721 MRI JNT OF LWR EXTRE W/O DYE: CPT

## 2019-02-12 ENCOUNTER — OFFICE VISIT (OUTPATIENT)
Dept: ORTHOPEDIC SURGERY | Age: 44
End: 2019-02-12
Payer: COMMERCIAL

## 2019-02-12 VITALS — BODY MASS INDEX: 26.41 KG/M2 | WEIGHT: 195 LBS | TEMPERATURE: 98 F | HEIGHT: 72 IN

## 2019-02-12 DIAGNOSIS — M17.11 PRIMARY OSTEOARTHRITIS OF RIGHT KNEE: Primary | ICD-10-CM

## 2019-02-12 PROCEDURE — 20610 DRAIN/INJ JOINT/BURSA W/O US: CPT | Performed by: ORTHOPAEDIC SURGERY

## 2019-02-12 PROCEDURE — 99213 OFFICE O/P EST LOW 20 MIN: CPT | Performed by: ORTHOPAEDIC SURGERY

## 2019-02-12 RX ORDER — TRIAMCINOLONE ACETONIDE 40 MG/ML
40 INJECTION, SUSPENSION INTRA-ARTICULAR; INTRAMUSCULAR ONCE
Status: COMPLETED | OUTPATIENT
Start: 2019-02-12 | End: 2019-02-12

## 2019-02-12 RX ADMIN — TRIAMCINOLONE ACETONIDE 40 MG: 40 INJECTION, SUSPENSION INTRA-ARTICULAR; INTRAMUSCULAR at 13:27

## 2019-03-06 ENCOUNTER — OFFICE VISIT (OUTPATIENT)
Dept: NEUROLOGY | Facility: CLINIC | Age: 44
End: 2019-03-06

## 2019-03-06 VITALS
HEIGHT: 70 IN | DIASTOLIC BLOOD PRESSURE: 80 MMHG | WEIGHT: 150 LBS | SYSTOLIC BLOOD PRESSURE: 111 MMHG | BODY MASS INDEX: 21.47 KG/M2

## 2019-03-06 DIAGNOSIS — R20.0 RIGHT LEG NUMBNESS: Primary | ICD-10-CM

## 2019-03-06 PROCEDURE — 99212 OFFICE O/P EST SF 10 MIN: CPT | Performed by: PSYCHIATRY & NEUROLOGY

## 2019-03-06 RX ORDER — PREDNISONE 10 MG/1
TABLET ORAL
Qty: 30 TABLET | Refills: 1 | Status: SHIPPED | OUTPATIENT
Start: 2019-03-06 | End: 2019-11-18

## 2019-03-06 NOTE — PROGRESS NOTES
Subjective:     Patient ID: Douglas Tan is a 43 y.o. male.    CC: No chief complaint on file.      HPI:   History of Present Illness  The following portions of the patient's history were reviewed and updated as appropriate: allergies, current medications, past family history, past medical history, past social history, past surgical history and problem list.     Patient c/o left thigh numbness and discomfort that developed about 2 months ago, does not radiate below the knee. He is in pain management, on oxycodone. He was taken off gabapentin because of intentional drug overdose.    Past Medical History:   Diagnosis Date   • Anxiety    • Arthritis    • Back pain, chronic    • Carpal tunnel syndrome    • Constipation    • Depression    • Diverticulosis    • Fibromyalgia    • Fractures    • GERD (gastroesophageal reflux disease)    • Head ache    • Hiatal hernia    • History of blood transfusion    • Hypertension     pt states no longer has it, was from anxiety attacks.    • Migraine    • Shoulder injury    • Tendonitis    • Tobacco abuse    • Traumatic brain injury (CMS/[facility])        Past Surgical History:   Procedure Laterality Date   • EYE SURGERY Left 02/1999    reconstruction   • EYE SURGERY      reconstruction of left eye    • SPINAL CORD STIMULATOR IMPLANT N/A 5/16/2017    Procedure: INTRATHECAL CATHETER PLACEMENT ;  Surgeon: Aristides Zafar MD;  Location: [facility];  Service:    • TRACHEOSTOMY     • WISDOM TOOTH EXTRACTION         Social History     Socioeconomic History   • Marital status:      Spouse name: Not on file   • Number of children: Not on file   • Years of education: Not on file   • Highest education level: Not on file   Social Needs   • Financial resource strain: Not on file   • Food insecurity - worry: Not on file   • Food insecurity - inability: Not on file   • Transportation needs - medical: Not on file   • Transportation needs - non-medical: Not on file   Occupational History    • Not on file   Tobacco Use   • Smoking status: Current Every Day Smoker     Packs/day: 1.00     Years: 20.00     Pack years: 20.00     Types: Cigarettes   • Smokeless tobacco: Former User   Substance and Sexual Activity   • Alcohol use: Yes     Comment: PT REPORTS DRINKING 6 BEERS TODAY   • Drug use: No   • Sexual activity: Defer   Other Topics Concern   • Not on file   Social History Narrative   • Not on file       Family History   Problem Relation Age of Onset   • Arthritis Mother    • Other Mother         Malignant neoplasm   • Arthritis Other    • Other Other         Malignant neoplasm   • Hypertension Other    • Alcohol abuse Father    • Arthritis Other    • Cancer Other         Review of Systems   Constitutional: Negative for chills, fatigue, fever and unexpected weight change.   HENT: Negative for ear pain, hearing loss, nosebleeds, rhinorrhea and sore throat.    Eyes: Negative for photophobia, pain, discharge, itching and visual disturbance.   Respiratory: Negative for cough, chest tightness, shortness of breath and wheezing.    Cardiovascular: Negative for chest pain, palpitations and leg swelling.   Gastrointestinal: Positive for nausea. Negative for abdominal pain, blood in stool, constipation, diarrhea and vomiting.   Genitourinary: Negative for dysuria, frequency, hematuria and urgency.   Musculoskeletal: Positive for back pain, joint swelling, neck pain and neck stiffness. Negative for arthralgias, gait problem and myalgias.   Skin: Negative for rash and wound.   Allergic/Immunologic: Negative for environmental allergies and food allergies.   Neurological: Positive for dizziness, tremors, syncope, light-headedness, numbness and headaches. Negative for seizures, speech difficulty and weakness.   Hematological: Negative for adenopathy. Does not bruise/bleed easily.   Psychiatric/Behavioral: Positive for decreased concentration and sleep disturbance. Negative for agitation, confusion, hallucinations  and suicidal ideas. The patient is nervous/anxious.         Objective:    Neurologic Exam     Mental Status   Oriented to person, place, and time.       Physical Exam   Constitutional: He is oriented to person, place, and time. He appears well-developed and well-nourished.   Cardiovascular: Normal rate and regular rhythm.   Pulmonary/Chest: Effort normal.   Musculoskeletal:   Positive SLR on the left.   Neurological: He is alert and oriented to person, place, and time. He has normal reflexes.   Psychiatric: He has a normal mood and affect. His behavior is normal. Thought content normal.       Assessment/Plan:       Diagnoses and all orders for this visit:    Right leg/thigh numbness, favor meralgia over radiculopathy  -     EMG & Nerve Conduction Test  -     predniSONE (DELTASONE) 10 MG tablet; 4/dx3d, 3/dx3d, 2/dx3d, 1/x3d             Manuel Young MD  3/6/2019

## 2019-04-19 ENCOUNTER — HOSPITAL ENCOUNTER (OUTPATIENT)
Dept: NEUROLOGY | Facility: HOSPITAL | Age: 44
Discharge: HOME | End: 2019-04-19
Payer: MEDICARE

## 2019-04-19 PROCEDURE — 95886 MUSC TEST DONE W/N TEST COMP: CPT

## 2019-04-19 PROCEDURE — 95910 NRV CNDJ TEST 7-8 STUDIES: CPT

## 2019-06-07 RX ORDER — PROMETHAZINE HYDROCHLORIDE 25 MG/1
TABLET ORAL
Qty: 50 TABLET | Refills: 2 | Status: SHIPPED | OUTPATIENT
Start: 2019-06-07 | End: 2019-11-18

## 2019-08-29 ENCOUNTER — APPOINTMENT (OUTPATIENT)
Dept: CT IMAGING | Facility: HOSPITAL | Age: 44
End: 2019-08-29
Payer: MEDICARE

## 2019-08-29 ENCOUNTER — HOSPITAL ENCOUNTER (EMERGENCY)
Facility: HOSPITAL | Age: 44
Discharge: HOME | End: 2019-08-29
Attending: HOSPITALIST
Payer: MEDICARE

## 2019-08-29 VITALS
WEIGHT: 160 LBS | BODY MASS INDEX: 22.9 KG/M2 | HEIGHT: 70 IN | DIASTOLIC BLOOD PRESSURE: 91 MMHG | RESPIRATION RATE: 16 BRPM | SYSTOLIC BLOOD PRESSURE: 128 MMHG | TEMPERATURE: 98.6 F | HEART RATE: 90 BPM | OXYGEN SATURATION: 98 %

## 2019-08-29 DIAGNOSIS — R11.2 NON-INTRACTABLE VOMITING WITH NAUSEA, UNSPECIFIED VOMITING TYPE: ICD-10-CM

## 2019-08-29 DIAGNOSIS — R82.4 URINE KETONES: ICD-10-CM

## 2019-08-29 DIAGNOSIS — R10.33 PERIUMBILICAL ABDOMINAL PAIN: Primary | ICD-10-CM

## 2019-08-29 LAB
A/G RATIO: 1.8 (ref 0.8–2)
ALBUMIN SERPL-MCNC: 4.7 G/DL (ref 3.4–4.8)
ALP BLD-CCNC: 77 U/L (ref 25–100)
ALT SERPL-CCNC: 19 U/L (ref 4–36)
ANION GAP SERPL CALCULATED.3IONS-SCNC: 17 MMOL/L (ref 3–16)
AST SERPL-CCNC: 21 U/L (ref 8–33)
BASOPHILS ABSOLUTE: 0.1 K/UL (ref 0–0.1)
BASOPHILS RELATIVE PERCENT: 0.9 %
BILIRUB SERPL-MCNC: 0.5 MG/DL (ref 0.3–1.2)
BILIRUBIN URINE: ABNORMAL
BLOOD, URINE: ABNORMAL
BUN BLDV-MCNC: 13 MG/DL (ref 6–20)
CALCIUM SERPL-MCNC: 9.6 MG/DL (ref 8.5–10.5)
CHLORIDE BLD-SCNC: 101 MMOL/L (ref 98–107)
CLARITY: CLEAR
CO2: 22 MMOL/L (ref 20–30)
COLOR: YELLOW
CREAT SERPL-MCNC: 0.9 MG/DL (ref 0.4–1.2)
EOSINOPHILS ABSOLUTE: 0 K/UL (ref 0–0.4)
EOSINOPHILS RELATIVE PERCENT: 0.3 %
EPITHELIAL CELLS, UA: ABNORMAL /HPF
GFR AFRICAN AMERICAN: >59
GFR NON-AFRICAN AMERICAN: >59
GLOBULIN: 2.6 G/DL
GLUCOSE BLD-MCNC: 111 MG/DL (ref 74–106)
GLUCOSE URINE: NEGATIVE MG/DL
HCT VFR BLD CALC: 41.6 % (ref 40–54)
HEMOGLOBIN: 14.5 G/DL (ref 13–18)
IMMATURE GRANULOCYTES #: 0 K/UL
IMMATURE GRANULOCYTES %: 0.3 % (ref 0–5)
KETONES, URINE: 80 MG/DL
LEUKOCYTE ESTERASE, URINE: NEGATIVE
LIPASE: 15 U/L (ref 5.6–51.3)
LYMPHOCYTES ABSOLUTE: 2.4 K/UL (ref 1.5–4)
LYMPHOCYTES RELATIVE PERCENT: 20.2 %
MCH RBC QN AUTO: 30.5 PG (ref 27–32)
MCHC RBC AUTO-ENTMCNC: 34.9 G/DL (ref 31–35)
MCV RBC AUTO: 87.4 FL (ref 80–100)
MICROSCOPIC EXAMINATION: YES
MONOCYTES ABSOLUTE: 0.6 K/UL (ref 0.2–0.8)
MONOCYTES RELATIVE PERCENT: 4.6 %
MUCUS: ABNORMAL /LPF
NEUTROPHILS ABSOLUTE: 8.9 K/UL (ref 2–7.5)
NEUTROPHILS RELATIVE PERCENT: 73.7 %
NITRITE, URINE: NEGATIVE
PDW BLD-RTO: 14.1 % (ref 11–16)
PH UA: 5.5 (ref 5–8)
PLATELET # BLD: 408 K/UL (ref 150–400)
PMV BLD AUTO: 8.7 FL (ref 6–10)
POTASSIUM REFLEX MAGNESIUM: 3.8 MMOL/L (ref 3.4–5.1)
PROTEIN UA: 100 MG/DL
RBC # BLD: 4.76 M/UL (ref 4.5–6)
RBC UA: ABNORMAL /HPF (ref 0–2)
SODIUM BLD-SCNC: 140 MMOL/L (ref 136–145)
SPECIFIC GRAVITY UA: >=1.03 (ref 1–1.03)
TOTAL PROTEIN: 7.3 G/DL (ref 6.4–8.3)
URINE REFLEX TO CULTURE: ABNORMAL
URINE TYPE: ABNORMAL
UROBILINOGEN, URINE: 0.2 E.U./DL
WBC # BLD: 12.1 K/UL (ref 4–11)
WBC UA: ABNORMAL /HPF (ref 0–5)

## 2019-08-29 PROCEDURE — 99284 EMERGENCY DEPT VISIT MOD MDM: CPT

## 2019-08-29 PROCEDURE — 6360000002 HC RX W HCPCS: Performed by: HOSPITALIST

## 2019-08-29 PROCEDURE — 36415 COLL VENOUS BLD VENIPUNCTURE: CPT

## 2019-08-29 PROCEDURE — 96375 TX/PRO/DX INJ NEW DRUG ADDON: CPT

## 2019-08-29 PROCEDURE — 80053 COMPREHEN METABOLIC PANEL: CPT

## 2019-08-29 PROCEDURE — 96361 HYDRATE IV INFUSION ADD-ON: CPT

## 2019-08-29 PROCEDURE — 83690 ASSAY OF LIPASE: CPT

## 2019-08-29 PROCEDURE — 81001 URINALYSIS AUTO W/SCOPE: CPT

## 2019-08-29 PROCEDURE — 85025 COMPLETE CBC W/AUTO DIFF WBC: CPT

## 2019-08-29 PROCEDURE — 96374 THER/PROPH/DIAG INJ IV PUSH: CPT

## 2019-08-29 PROCEDURE — 2580000003 HC RX 258: Performed by: HOSPITALIST

## 2019-08-29 PROCEDURE — 74176 CT ABD & PELVIS W/O CONTRAST: CPT

## 2019-08-29 RX ORDER — IBUPROFEN 800 MG/1
800 TABLET ORAL EVERY 8 HOURS PRN
Qty: 20 TABLET | Refills: 0 | Status: SHIPPED | OUTPATIENT
Start: 2019-08-29 | End: 2020-11-12

## 2019-08-29 RX ORDER — 0.9 % SODIUM CHLORIDE 0.9 %
1000 INTRAVENOUS SOLUTION INTRAVENOUS ONCE
Status: COMPLETED | OUTPATIENT
Start: 2019-08-29 | End: 2019-08-29

## 2019-08-29 RX ORDER — ONDANSETRON 4 MG/1
4 TABLET, ORALLY DISINTEGRATING ORAL EVERY 4 HOURS PRN
Qty: 15 TABLET | Refills: 0 | Status: SHIPPED | OUTPATIENT
Start: 2019-08-29 | End: 2020-01-26

## 2019-08-29 RX ORDER — MORPHINE SULFATE 2 MG/ML
2 INJECTION, SOLUTION INTRAMUSCULAR; INTRAVENOUS EVERY 30 MIN PRN
Status: DISCONTINUED | OUTPATIENT
Start: 2019-08-29 | End: 2019-08-29 | Stop reason: HOSPADM

## 2019-08-29 RX ORDER — KETOROLAC TROMETHAMINE 30 MG/ML
30 INJECTION, SOLUTION INTRAMUSCULAR; INTRAVENOUS ONCE
Status: COMPLETED | OUTPATIENT
Start: 2019-08-29 | End: 2019-08-29

## 2019-08-29 RX ORDER — ONDANSETRON 2 MG/ML
4 INJECTION INTRAMUSCULAR; INTRAVENOUS EVERY 30 MIN PRN
Status: DISCONTINUED | OUTPATIENT
Start: 2019-08-29 | End: 2019-08-29 | Stop reason: HOSPADM

## 2019-08-29 RX ADMIN — SODIUM CHLORIDE 1000 ML: 9 INJECTION, SOLUTION INTRAVENOUS at 11:15

## 2019-08-29 RX ADMIN — MORPHINE SULFATE 2 MG: 2 INJECTION, SOLUTION INTRAMUSCULAR; INTRAVENOUS at 11:50

## 2019-08-29 RX ADMIN — ONDANSETRON 4 MG: 2 INJECTION INTRAMUSCULAR; INTRAVENOUS at 11:16

## 2019-08-29 RX ADMIN — KETOROLAC TROMETHAMINE 30 MG: 30 INJECTION, SOLUTION INTRAMUSCULAR at 11:16

## 2019-08-29 ASSESSMENT — PAIN - FUNCTIONAL ASSESSMENT: PAIN_FUNCTIONAL_ASSESSMENT: 0-10

## 2019-08-29 ASSESSMENT — PAIN DESCRIPTION - LOCATION
LOCATION: ABDOMEN
LOCATION: ABDOMEN

## 2019-08-29 ASSESSMENT — PAIN DESCRIPTION - PAIN TYPE
TYPE: ACUTE PAIN
TYPE: ACUTE PAIN

## 2019-08-29 ASSESSMENT — PAIN SCALES - GENERAL
PAINLEVEL_OUTOF10: 8
PAINLEVEL_OUTOF10: 10
PAINLEVEL_OUTOF10: 10
PAINLEVEL_OUTOF10: 3

## 2019-08-29 ASSESSMENT — PAIN DESCRIPTION - DESCRIPTORS: DESCRIPTORS: SHARP

## 2019-08-29 ASSESSMENT — PAIN DESCRIPTION - ORIENTATION: ORIENTATION: RIGHT;LOWER

## 2019-08-29 ASSESSMENT — PAIN DESCRIPTION - FREQUENCY: FREQUENCY: INTERMITTENT

## 2019-08-29 NOTE — ED NOTES
Iv access removed. Pt awaits ride. I will review discharge papers and plan once pt ride is here.   Pt understands plan     Lawtarun Lawrence RN  08/29/19 [id number]

## 2019-08-29 NOTE — ED PROVIDER NOTES
grossly normal, cognition is grossly normal. Affect is appropriate. DIAGNOSTIC RESULTS     EKG: All EKG's are interpreted by the Emergency Department Physician who either signs or Co-signs this chart in the [address] a cardiologist.        RADIOLOGY:   Non-plain film images such as CT, Ultrasound and MRI are read by the radiologist. Plain radiographic images are visualized and preliminarily interpreted by the emergency physician with the below findings:      ? Radiologist's Report Reviewed:  CT ABDOMEN PELVIS WO CONTRAST Additional Contrast? None   Final Result      1. No acute intra-abdominopelvic abnormality. Marked diffuse fatty infiltration of the liver.             ED BEDSIDE ULTRASOUND:   Performed by ED Physician - none    LABS:    I have reviewed and interpreted all of the currently available lab results from this visit (ifapplicable):  Results for orders placed or performed during the hospital encounter of 08/29/19   CBC Auto Differential   Result Value Ref Range    WBC 12.1 (H) 4.0 - 11.0 K/uL    RBC 4.76 4.50 - 6.00 M/uL    Hemoglobin 14.5 13.0 - 18.0 g/dL    Hematocrit 41.6 40.0 - 54.0 %    MCV 87.4 80.0 - 100.0 fL    MCH 30.5 27.0 - 32.0 pg    MCHC 34.9 31.0 - 35.0 g/dL    RDW 14.1 11.0 - 16.0 %    Platelets 405 (H) 062 - 400 K/uL    MPV 8.7 6.0 - 10.0 fL    Neutrophils % 73.7 %    Immature Granulocytes % 0.3 0.0 - 5.0 %    Lymphocytes % 20.2 %    Monocytes % 4.6 %    Eosinophils % 0.3 %    Basophils % 0.9 %    Neutrophils Absolute 8.9 (H) 2.0 - 7.5 K/uL    Immature Granulocytes # 0.0 K/uL    Lymphocytes Absolute 2.4 1.5 - 4.0 K/uL    Monocytes Absolute 0.6 0.2 - 0.8 K/uL    Eosinophils Absolute 0.0 0.0 - 0.4 K/uL    Basophils Absolute 0.1 0.0 - 0.1 K/uL   Comprehensive Metabolic Panel w/ Reflex to MG   Result Value Ref Range    Sodium 140 136 - 145 mmol/L    Potassium reflex Magnesium 3.8 3.4 - 5.1 mmol/L    Chloride 101 98 - 107 mmol/L    CO2 22 20 - 30 mmol/L    Anion Gap 17 (H) 3 - 16 Given 8/29/19 1150)   ketorolac (TORADOL) injection 30 mg (30 mg Intravenous Given 8/29/19 1116)       After initial evaluation and examination I did have a conversation with the patient about the upcoming plan, treatment and possible disposition which they were agreeable to the time of this dictation. Patient denies we give him a fluid bolus of normal saline. We will give him Toradol for pain initially. We'll also offer him Zofran for nausea. If the patient's pain is not controlled with the Toradol we will try morphine when necessary. Patient was CT scan abdomen and pelvis without contrast performed for evaluation of his a periumbilical abdominal pain. Patient has CBC, CMP, lipase and UA performed here. Final disposition will be determined once his radiological diagnostic studies be performed and reviewed. Blood work showed white count 12,100, hemoglobin 14.5, HemoCue crit was 41.6, platelet counts 987. CMP was benign except for a glucose of 111. Lipase was normal at 15.    UA moderate bilirubin and 80 ketones. Specific gravity greater than 1.030. Small amount of blood. Microscopy showed +2 mucus, 3-5 white cells, 3-5 red cells, 5-10 epithelial cells. Did not meet culture criteria. CT scan abdomen and pelvis without contrast read by radiology as no acute intra-abdominal pelvic abnormality. Marked diffuse fatty infiltration of the liver. Patient's radiological and diagnostic studies were discussed with him and he does state his understanding. Patient still states his pain is an 8 out of 10 but he is sitting on the bed watching television in no acute distress. He is nontoxic looking. Patient denies the findings on his CT scan. I did discuss all his laboratory studies with him also. Advised that he does need to rehydrate himself with fluids. Klaus Ruano is okay along with water. Patient advised that we give him a prescription for Zofran for nausea and ibuprofen for pain.  Given instructions that if his

## 2019-08-29 NOTE — ED NOTES
Reviewed discharge plan with Becca Elliott. Encouraged him to f/u with Dr Corina Rojas and he understood. NAD noted on discharge, gait steady. Reviewed discharge prescription for:     Current Discharge Medication List      START taking these medications    Details   ondansetron (ZOFRAN ODT) 4 MG disintegrating tablet Take 1 tablet by mouth every 4 hours as needed for Nausea or Vomiting  Qty: 15 tablet, Refills: 0      ibuprofen (IBU) 800 MG tablet Take 1 tablet by mouth every 8 hours as needed for Pain  Qty: 20 tablet, Refills: 0             Mony Elaine states understanding of how and when to take medications.       Electronically signed by Diane Jett RN on 8/29/2019 at 1:07 PM     Diane Jett RN  08/29/19 0700

## 2019-08-29 NOTE — ED NOTES
Pt states his sister should be here soon. Pain is 4/10 at this time.        Meagan Lawrence RN  08/29/19 [id number]

## 2019-08-31 ENCOUNTER — HOSPITAL ENCOUNTER (EMERGENCY)
Facility: HOSPITAL | Age: 44
Discharge: LEFT BEFORE DISCHARGE | End: 2019-08-31
Attending: EMERGENCY MEDICINE | Admitting: EMERGENCY MEDICINE

## 2019-08-31 VITALS
BODY MASS INDEX: 22.9 KG/M2 | RESPIRATION RATE: 18 BRPM | WEIGHT: 160 LBS | TEMPERATURE: 98.1 F | HEART RATE: 100 BPM | SYSTOLIC BLOOD PRESSURE: 123 MMHG | DIASTOLIC BLOOD PRESSURE: 87 MMHG | OXYGEN SATURATION: 97 % | HEIGHT: 70 IN

## 2019-08-31 PROCEDURE — 99282 EMERGENCY DEPT VISIT SF MDM: CPT

## 2019-10-08 ENCOUNTER — OFFICE VISIT (OUTPATIENT)
Dept: FAMILY MEDICINE CLINIC | Age: 44
End: 2019-10-08
Payer: COMMERCIAL

## 2019-10-08 VITALS
RESPIRATION RATE: 16 BRPM | HEIGHT: 72 IN | DIASTOLIC BLOOD PRESSURE: 74 MMHG | BODY MASS INDEX: 23.03 KG/M2 | HEART RATE: 105 BPM | OXYGEN SATURATION: 98 % | TEMPERATURE: 98.1 F | SYSTOLIC BLOOD PRESSURE: 128 MMHG | WEIGHT: 170 LBS

## 2019-10-08 DIAGNOSIS — E55.9 VITAMIN D DEFICIENCY: ICD-10-CM

## 2019-10-08 DIAGNOSIS — H81.09 MENIERE'S DISEASE, UNSPECIFIED LATERALITY: ICD-10-CM

## 2019-10-08 DIAGNOSIS — F33.2 SEVERE EPISODE OF RECURRENT MAJOR DEPRESSIVE DISORDER, WITHOUT PSYCHOTIC FEATURES (HCC): Primary | ICD-10-CM

## 2019-10-08 DIAGNOSIS — R19.7 DIARRHEA, UNSPECIFIED TYPE: Primary | ICD-10-CM

## 2019-10-08 DIAGNOSIS — G43.809 VESTIBULAR MIGRAINE: ICD-10-CM

## 2019-10-08 DIAGNOSIS — R73.09 ELEVATED GLUCOSE LEVEL: ICD-10-CM

## 2019-10-08 DIAGNOSIS — Z13.220 SCREENING CHOLESTEROL LEVEL: ICD-10-CM

## 2019-10-08 DIAGNOSIS — R53.83 FATIGUE, UNSPECIFIED TYPE: ICD-10-CM

## 2019-10-08 PROCEDURE — G0444 DEPRESSION SCREEN ANNUAL: HCPCS | Performed by: NURSE PRACTITIONER

## 2019-10-08 PROCEDURE — 99214 OFFICE O/P EST MOD 30 MIN: CPT | Performed by: NURSE PRACTITIONER

## 2019-10-08 RX ORDER — ESCITALOPRAM OXALATE 5 MG/1
5 TABLET ORAL DAILY
Qty: 30 TABLET | Refills: 2 | Status: SHIPPED | OUTPATIENT
Start: 2019-10-08 | End: 2019-10-29 | Stop reason: DRUGHIGH

## 2019-10-08 ASSESSMENT — ENCOUNTER SYMPTOMS
COUGH: 0
NAUSEA: 0
WHEEZING: 0
CONSTIPATION: 0
VOMITING: 0
DIARRHEA: 0
SHORTNESS OF BREATH: 0

## 2019-10-08 ASSESSMENT — COLUMBIA-SUICIDE SEVERITY RATING SCALE - C-SSRS
2. HAVE YOU ACTUALLY HAD ANY THOUGHTS OF KILLING YOURSELF?: NO
6. HAVE YOU EVER DONE ANYTHING, STARTED TO DO ANYTHING, OR PREPARED TO DO ANYTHING TO END YOUR LIFE?: NO
1. WITHIN THE PAST MONTH, HAVE YOU WISHED YOU WERE DEAD OR WISHED YOU COULD GO TO SLEEP AND NOT WAKE UP?: NO

## 2019-10-08 ASSESSMENT — PATIENT HEALTH QUESTIONNAIRE - PHQ9
9. THOUGHTS THAT YOU WOULD BE BETTER OFF DEAD, OR OF HURTING YOURSELF: 0
8. MOVING OR SPEAKING SO SLOWLY THAT OTHER PEOPLE COULD HAVE NOTICED. OR THE OPPOSITE, BEING SO FIGETY OR RESTLESS THAT YOU HAVE BEEN MOVING AROUND A LOT MORE THAN USUAL: 0
6. FEELING BAD ABOUT YOURSELF - OR THAT YOU ARE A FAILURE OR HAVE LET YOURSELF OR YOUR FAMILY DOWN: 3
7. TROUBLE CONCENTRATING ON THINGS, SUCH AS READING THE NEWSPAPER OR WATCHING TELEVISION: 3
1. LITTLE INTEREST OR PLEASURE IN DOING THINGS: 3
2. FEELING DOWN, DEPRESSED OR HOPELESS: 3
SUM OF ALL RESPONSES TO PHQ QUESTIONS 1-9: 21
5. POOR APPETITE OR OVEREATING: 3
3. TROUBLE FALLING OR STAYING ASLEEP: 3
SUM OF ALL RESPONSES TO PHQ QUESTIONS 1-9: 21
SUM OF ALL RESPONSES TO PHQ9 QUESTIONS 1 & 2: 6
4. FEELING TIRED OR HAVING LITTLE ENERGY: 3
10. IF YOU CHECKED OFF ANY PROBLEMS, HOW DIFFICULT HAVE THESE PROBLEMS MADE IT FOR YOU TO DO YOUR WORK, TAKE CARE OF THINGS AT HOME, OR GET ALONG WITH OTHER PEOPLE: 3

## 2019-10-10 ENCOUNTER — HOSPITAL ENCOUNTER (OUTPATIENT)
Age: 44
Discharge: HOME | End: 2019-10-12
Payer: COMMERCIAL

## 2019-10-10 DIAGNOSIS — Z13.220 SCREENING CHOLESTEROL LEVEL: ICD-10-CM

## 2019-10-10 DIAGNOSIS — E55.9 VITAMIN D DEFICIENCY: ICD-10-CM

## 2019-10-10 DIAGNOSIS — R53.83 FATIGUE, UNSPECIFIED TYPE: ICD-10-CM

## 2019-10-10 DIAGNOSIS — R73.09 ELEVATED GLUCOSE LEVEL: ICD-10-CM

## 2019-10-10 LAB
ALBUMIN SERPL-MCNC: 5 G/DL (ref 3.5–5.2)
ALP BLD-CCNC: 79 U/L (ref 40–129)
ALT SERPL-CCNC: 23 U/L (ref 0–40)
ANION GAP SERPL CALCULATED.3IONS-SCNC: 15 MMOL/L (ref 7–16)
AST SERPL-CCNC: 22 U/L (ref 0–39)
BASOPHILS ABSOLUTE: 0.06 E9/L (ref 0–0.2)
BASOPHILS RELATIVE PERCENT: 1 % (ref 0–2)
BILIRUB SERPL-MCNC: 1.3 MG/DL (ref 0–1.2)
BUN BLDV-MCNC: 17 MG/DL (ref 6–20)
CALCIUM SERPL-MCNC: 10.1 MG/DL (ref 8.6–10.2)
CHLORIDE BLD-SCNC: 101 MMOL/L (ref 98–107)
CHOLESTEROL, TOTAL: 230 MG/DL (ref 0–199)
CO2: 25 MMOL/L (ref 22–29)
CREAT SERPL-MCNC: 1 MG/DL (ref 0.7–1.2)
EOSINOPHILS ABSOLUTE: 0.11 E9/L (ref 0.05–0.5)
EOSINOPHILS RELATIVE PERCENT: 1.8 % (ref 0–6)
GFR AFRICAN AMERICAN: >60
GFR NON-AFRICAN AMERICAN: >60 ML/MIN/1.73
GLUCOSE BLD-MCNC: 100 MG/DL (ref 74–99)
HCT VFR BLD CALC: 51 % (ref 37–54)
HDLC SERPL-MCNC: 57 MG/DL
HEMOGLOBIN: 17.1 G/DL (ref 12.5–16.5)
IMMATURE GRANULOCYTES #: 0.02 E9/L
IMMATURE GRANULOCYTES %: 0.3 % (ref 0–5)
LDL CHOLESTEROL CALCULATED: 154 MG/DL (ref 0–99)
LYMPHOCYTES ABSOLUTE: 2.03 E9/L (ref 1.5–4)
LYMPHOCYTES RELATIVE PERCENT: 33.7 % (ref 20–42)
MCH RBC QN AUTO: 30.7 PG (ref 26–35)
MCHC RBC AUTO-ENTMCNC: 33.5 % (ref 32–34.5)
MCV RBC AUTO: 91.6 FL (ref 80–99.9)
MONOCYTES ABSOLUTE: 0.46 E9/L (ref 0.1–0.95)
MONOCYTES RELATIVE PERCENT: 7.6 % (ref 2–12)
NEUTROPHILS ABSOLUTE: 3.34 E9/L (ref 1.8–7.3)
NEUTROPHILS RELATIVE PERCENT: 55.6 % (ref 43–80)
PDW BLD-RTO: 13 FL (ref 11.5–15)
PLATELET # BLD: 280 E9/L (ref 130–450)
PMV BLD AUTO: 10.6 FL (ref 7–12)
POTASSIUM SERPL-SCNC: 4.5 MMOL/L (ref 3.5–5)
RBC # BLD: 5.57 E12/L (ref 3.8–5.8)
SODIUM BLD-SCNC: 141 MMOL/L (ref 132–146)
TOTAL PROTEIN: 7.2 G/DL (ref 6.4–8.3)
TRIGL SERPL-MCNC: 93 MG/DL (ref 0–149)
TSH SERPL DL<=0.05 MIU/L-ACNC: 4.2 UIU/ML (ref 0.27–4.2)
VITAMIN D 25-HYDROXY: 26 NG/ML (ref 30–100)
VLDLC SERPL CALC-MCNC: 19 MG/DL
WBC # BLD: 6 E9/L (ref 4.5–11.5)

## 2019-10-10 PROCEDURE — 85025 COMPLETE CBC W/AUTO DIFF WBC: CPT

## 2019-10-10 PROCEDURE — 80061 LIPID PANEL: CPT

## 2019-10-10 PROCEDURE — 80053 COMPREHEN METABOLIC PANEL: CPT

## 2019-10-10 PROCEDURE — 82306 VITAMIN D 25 HYDROXY: CPT

## 2019-10-10 PROCEDURE — 84443 ASSAY THYROID STIM HORMONE: CPT

## 2019-10-29 ENCOUNTER — OFFICE VISIT (OUTPATIENT)
Dept: FAMILY MEDICINE CLINIC | Age: 44
End: 2019-10-29
Payer: COMMERCIAL

## 2019-10-29 VITALS
BODY MASS INDEX: 23.7 KG/M2 | RESPIRATION RATE: 16 BRPM | WEIGHT: 175 LBS | HEIGHT: 72 IN | SYSTOLIC BLOOD PRESSURE: 124 MMHG | HEART RATE: 98 BPM | TEMPERATURE: 98.2 F | OXYGEN SATURATION: 98 % | DIASTOLIC BLOOD PRESSURE: 74 MMHG

## 2019-10-29 DIAGNOSIS — E78.2 MIXED HYPERLIPIDEMIA: ICD-10-CM

## 2019-10-29 DIAGNOSIS — F33.2 SEVERE EPISODE OF RECURRENT MAJOR DEPRESSIVE DISORDER, WITHOUT PSYCHOTIC FEATURES (HCC): Primary | ICD-10-CM

## 2019-10-29 PROCEDURE — 99213 OFFICE O/P EST LOW 20 MIN: CPT | Performed by: NURSE PRACTITIONER

## 2019-10-29 RX ORDER — ESCITALOPRAM OXALATE 10 MG/1
10 TABLET ORAL DAILY
Qty: 30 TABLET | Refills: 2 | Status: SHIPPED | OUTPATIENT
Start: 2019-10-29 | End: 2019-11-19 | Stop reason: DRUGHIGH

## 2019-10-29 ASSESSMENT — ENCOUNTER SYMPTOMS
SHORTNESS OF BREATH: 0
CONSTIPATION: 0
NAUSEA: 0
WHEEZING: 0
COUGH: 0
VOMITING: 0
DIARRHEA: 0

## 2019-11-18 ENCOUNTER — OFFICE VISIT (OUTPATIENT)
Dept: INTERNAL MEDICINE | Facility: CLINIC | Age: 44
End: 2019-11-18

## 2019-11-18 VITALS
SYSTOLIC BLOOD PRESSURE: 152 MMHG | HEART RATE: 95 BPM | RESPIRATION RATE: 16 BRPM | HEIGHT: 70 IN | OXYGEN SATURATION: 98 % | BODY MASS INDEX: 23.78 KG/M2 | TEMPERATURE: 98.4 F | DIASTOLIC BLOOD PRESSURE: 99 MMHG | WEIGHT: 166.12 LBS

## 2019-11-18 DIAGNOSIS — I10 ESSENTIAL HYPERTENSION: ICD-10-CM

## 2019-11-18 DIAGNOSIS — G89.29 CHRONIC BILATERAL LOW BACK PAIN WITHOUT SCIATICA: Primary | ICD-10-CM

## 2019-11-18 DIAGNOSIS — R10.13 EPIGASTRIC PAIN: ICD-10-CM

## 2019-11-18 DIAGNOSIS — R68.3 CLUBBING OF FINGERS: ICD-10-CM

## 2019-11-18 DIAGNOSIS — M54.50 CHRONIC BILATERAL LOW BACK PAIN WITHOUT SCIATICA: Primary | ICD-10-CM

## 2019-11-18 DIAGNOSIS — R53.83 FATIGUE, UNSPECIFIED TYPE: ICD-10-CM

## 2019-11-18 DIAGNOSIS — M19.90 ARTHRITIS: ICD-10-CM

## 2019-11-18 DIAGNOSIS — M54.2 NECK PAIN: ICD-10-CM

## 2019-11-18 DIAGNOSIS — R00.2 PALPITATION: ICD-10-CM

## 2019-11-18 DIAGNOSIS — M62.838 MUSCLE SPASM: ICD-10-CM

## 2019-11-18 DIAGNOSIS — G47.33 OSA (OBSTRUCTIVE SLEEP APNEA): ICD-10-CM

## 2019-11-18 DIAGNOSIS — R79.9 ABNORMAL FINDING OF BLOOD CHEMISTRY, UNSPECIFIED: ICD-10-CM

## 2019-11-18 PROCEDURE — 99214 OFFICE O/P EST MOD 30 MIN: CPT | Performed by: INTERNAL MEDICINE

## 2019-11-18 RX ORDER — LURASIDONE HYDROCHLORIDE 60 MG/1
60 TABLET, FILM COATED ORAL NIGHTLY
COMMUNITY

## 2019-11-18 RX ORDER — PANTOPRAZOLE SODIUM 40 MG/1
40 TABLET, DELAYED RELEASE ORAL DAILY
Refills: 0 | COMMUNITY
Start: 2019-11-08 | End: 2020-02-18

## 2019-11-18 RX ORDER — CARVEDILOL 3.12 MG/1
3.12 TABLET ORAL 2 TIMES DAILY WITH MEALS
Qty: 60 TABLET | Refills: 11 | Status: SHIPPED | OUTPATIENT
Start: 2019-11-18 | End: 2020-01-30

## 2019-11-18 RX ORDER — GABAPENTIN 800 MG/1
800 TABLET ORAL 3 TIMES DAILY
Refills: 0 | COMMUNITY
Start: 2019-11-12 | End: 2022-02-15
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
## 2019-11-19 ENCOUNTER — OFFICE VISIT (OUTPATIENT)
Dept: FAMILY MEDICINE CLINIC | Age: 44
End: 2019-11-19
Payer: COMMERCIAL

## 2019-11-19 VITALS
BODY MASS INDEX: 24.11 KG/M2 | HEIGHT: 72 IN | HEART RATE: 76 BPM | TEMPERATURE: 97.9 F | OXYGEN SATURATION: 99 % | RESPIRATION RATE: 16 BRPM | SYSTOLIC BLOOD PRESSURE: 116 MMHG | WEIGHT: 178 LBS | DIASTOLIC BLOOD PRESSURE: 68 MMHG

## 2019-11-19 DIAGNOSIS — F41.9 ANXIETY: ICD-10-CM

## 2019-11-19 DIAGNOSIS — F33.2 SEVERE EPISODE OF RECURRENT MAJOR DEPRESSIVE DISORDER, WITHOUT PSYCHOTIC FEATURES (HCC): Primary | ICD-10-CM

## 2019-11-19 PROCEDURE — 99213 OFFICE O/P EST LOW 20 MIN: CPT | Performed by: NURSE PRACTITIONER

## 2019-11-19 RX ORDER — ESCITALOPRAM OXALATE 20 MG/1
20 TABLET ORAL DAILY
Qty: 30 TABLET | Refills: 3 | Status: SHIPPED
Start: 2019-11-19 | End: 2020-03-12 | Stop reason: SDUPTHER

## 2019-11-19 ASSESSMENT — ENCOUNTER SYMPTOMS
CONSTIPATION: 0
COUGH: 0
WHEEZING: 0
VOMITING: 0
SHORTNESS OF BREATH: 0
DIARRHEA: 0
NAUSEA: 0

## 2019-11-22 LAB
A PHAGOCYTOPH IGG TITR SER IF: NEGATIVE {TITER}
A PHAGOCYTOPH IGM TITR SER IF: NEGATIVE {TITER}
ALBUMIN SERPL-MCNC: 4.7 G/DL (ref 3.5–5.2)
ALBUMIN/GLOB SERPL: 2.4 G/DL
ALP SERPL-CCNC: 73 U/L (ref 39–117)
ALT SERPL-CCNC: 136 U/L (ref 1–41)
ASO AB SERPL-ACNC: 114.1 IU/ML (ref 0–200)
AST SERPL-CCNC: 37 U/L (ref 1–40)
B BURGDOR DNA SPEC QL NAA+PROBE: NEGATIVE
BASOPHILS # BLD AUTO: 0.06 10*3/MM3 (ref 0–0.2)
BASOPHILS NFR BLD AUTO: 0.9 % (ref 0–1.5)
BILIRUB SERPL-MCNC: 0.6 MG/DL (ref 0.2–1.2)
BUN SERPL-MCNC: 7 MG/DL (ref 6–20)
BUN/CREAT SERPL: 6.8 (ref 7–25)
CALCIUM SERPL-MCNC: 9.4 MG/DL (ref 8.6–10.5)
CCP IGA+IGG SERPL IA-ACNC: 6 UNITS (ref 0–19)
CHLORIDE SERPL-SCNC: 99 MMOL/L (ref 98–107)
CHOLEST SERPL-MCNC: 210 MG/DL (ref 0–200)
CK SERPL-CCNC: 63 U/L (ref 20–200)
CO2 SERPL-SCNC: 26.7 MMOL/L (ref 22–29)
CREAT SERPL-MCNC: 1.03 MG/DL (ref 0.76–1.27)
CRP SERPL-MCNC: 0.18 MG/DL (ref 0–0.5)
E CHAFFEENSIS IGG TITR SER IF: NEGATIVE {TITER}
E CHAFFEENSIS IGM TITR SER IF: NEGATIVE {TITER}
ENDOMYSIAL ANTIBODY TITER IGA: NORMAL TITER
ENDOMYSIAL ANTIBODY TITER IGG: NORMAL TITER
EOSINOPHIL # BLD AUTO: 0.12 10*3/MM3 (ref 0–0.4)
EOSINOPHIL NFR BLD AUTO: 1.8 % (ref 0.3–6.2)
ERYTHROCYTE [DISTWIDTH] IN BLOOD BY AUTOMATED COUNT: 13.4 % (ref 12.3–15.4)
ERYTHROCYTE [SEDIMENTATION RATE] IN BLOOD BY WESTERGREN METHOD: 3 MM/HR (ref 0–15)
GLIADIN PEPTIDE IGA SER-ACNC: 5 UNITS (ref 0–19)
GLIADIN PEPTIDE IGG SER-ACNC: 2 UNITS (ref 0–19)
GLOBULIN SER CALC-MCNC: 2 GM/DL
GLUCOSE SERPL-MCNC: 91 MG/DL (ref 65–99)
H PYLORI IGA SER-ACNC: <9 UNITS (ref 0–8.9)
H PYLORI IGG SER IA-ACNC: 0.42 INDEX VALUE (ref 0–0.79)
HCT VFR BLD AUTO: 43 % (ref 37.5–51)
HDLC SERPL-MCNC: 89 MG/DL (ref 40–60)
HGB BLD-MCNC: 15.3 G/DL (ref 13–17.7)
IMM GRANULOCYTES # BLD AUTO: 0.02 10*3/MM3 (ref 0–0.05)
IMM GRANULOCYTES NFR BLD AUTO: 0.3 % (ref 0–0.5)
LDLC SERPL CALC-MCNC: 102 MG/DL (ref 0–100)
LYMPHOCYTES # BLD AUTO: 1.5 10*3/MM3 (ref 0.7–3.1)
LYMPHOCYTES NFR BLD AUTO: 22.8 % (ref 19.6–45.3)
MCH RBC QN AUTO: 34.1 PG (ref 26.6–33)
MCHC RBC AUTO-ENTMCNC: 35.6 G/DL (ref 31.5–35.7)
MCV RBC AUTO: 95.8 FL (ref 79–97)
MONOCYTES # BLD AUTO: 0.45 10*3/MM3 (ref 0.1–0.9)
MONOCYTES NFR BLD AUTO: 6.8 % (ref 5–12)
MYOGLOBIN SERPL-MCNC: <21 NG/ML (ref 28–72)
NEUTROPHILS # BLD AUTO: 4.44 10*3/MM3 (ref 1.7–7)
NEUTROPHILS NFR BLD AUTO: 67.4 % (ref 42.7–76)
NRBC BLD AUTO-RTO: 0 /100 WBC (ref 0–0.2)
PLATELET # BLD AUTO: 281 10*3/MM3 (ref 140–450)
POTASSIUM SERPL-SCNC: 4.4 MMOL/L (ref 3.5–5.2)
PROT SERPL-MCNC: 6.7 G/DL (ref 6–8.5)
R RICKETTSI IGG SER QL IA: NEGATIVE
R RICKETTSI IGM SER-ACNC: 0.53 INDEX (ref 0–0.89)
RBC # BLD AUTO: 4.49 10*6/MM3 (ref 4.14–5.8)
RHEUMATOID FACT SERPL-ACNC: <10 IU/ML (ref 0–13.9)
SODIUM SERPL-SCNC: 140 MMOL/L (ref 136–145)
T4 FREE SERPL-MCNC: 1.39 NG/DL (ref 0.93–1.7)
TRIGL SERPL-MCNC: 94 MG/DL (ref 0–150)
TSH SERPL DL<=0.005 MIU/L-ACNC: 0.87 UIU/ML (ref 0.27–4.2)
TTG IGA SER-ACNC: <2 U/ML (ref 0–3)
TTG IGG SER-ACNC: <2 U/ML (ref 0–5)
URATE SERPL-MCNC: 4.9 MG/DL (ref 3.4–7)
VIT B12 SERPL-MCNC: 759 PG/ML (ref 211–946)
VLDLC SERPL CALC-MCNC: 18.8 MG/DL
WBC # BLD AUTO: 6.59 10*3/MM3 (ref 3.4–10.8)

## 2019-12-04 ENCOUNTER — HOSPITAL ENCOUNTER (OUTPATIENT)
Facility: HOSPITAL | Age: 44
Discharge: HOME | End: 2019-12-04
Payer: MEDICARE

## 2019-12-04 ENCOUNTER — CONSULT (OUTPATIENT)
Dept: CARDIOLOGY | Facility: CLINIC | Age: 44
End: 2019-12-04

## 2019-12-04 VITALS
BODY MASS INDEX: 23.59 KG/M2 | SYSTOLIC BLOOD PRESSURE: 112 MMHG | OXYGEN SATURATION: 97 % | DIASTOLIC BLOOD PRESSURE: 80 MMHG | WEIGHT: 164.8 LBS | HEART RATE: 96 BPM | HEIGHT: 70 IN

## 2019-12-04 DIAGNOSIS — R00.2 PALPITATIONS: Primary | ICD-10-CM

## 2019-12-04 DIAGNOSIS — J44.9 SEVERE CHRONIC OBSTRUCTIVE PULMONARY DISEASE (HCC): ICD-10-CM

## 2019-12-04 DIAGNOSIS — I10 ESSENTIAL HYPERTENSION: ICD-10-CM

## 2019-12-04 DIAGNOSIS — F17.200 TOBACCO DEPENDENCE SYNDROME: ICD-10-CM

## 2019-12-04 DIAGNOSIS — R07.2 PRECORDIAL PAIN: ICD-10-CM

## 2019-12-04 PROCEDURE — 99204 OFFICE O/P NEW MOD 45 MIN: CPT | Performed by: INTERNAL MEDICINE

## 2019-12-04 PROCEDURE — 93000 ELECTROCARDIOGRAM COMPLETE: CPT | Performed by: INTERNAL MEDICINE

## 2019-12-04 PROCEDURE — 93005 ELECTROCARDIOGRAM TRACING: CPT

## 2019-12-04 NOTE — PROGRESS NOTES
Subjective:     Encounter Date:12/04/2019      Patient ID: Douglas Tan is a 43 y.o. male.    Chief Complaint: Palpitations  SIVA  Is a 43-year-old male patient who presents to cardiology clinic for a one year history of palpitations.  The patient describes a sensation that his heart is pounding and racing.  This tends to occur 3-5 times per week and will last anywhere from 30-60 minutes per episode.  He cannot identify any precipitating aggravating or alleviating features.  The episodes tend to occur at rest while he is laying down.  There is no associated dizziness or syncope.  He has no history of documented arrhythmia.  He has never worn a cardiac monitor.  He reports drinking alcohol on a daily basis primarily beer.  He reports using an electronic cigarette.  He reports rarely drinking caffeinated beverages.  He is unaware of having thyroid blood testing.  He denies using illicit substances.  The patient reports having chest discomfort for the last 18 months.  He describes it as a dull pressure sensation or tightness over the left precordium.  The episodes tend to occur 2-3 times per week and will generally last 30 minutes per episode.  The discomfort does not radiate and generally has an 8/10 intensity.  The discomfort typically occurs at rest.  He cannot identify any precipitating aggravating or alleviating features.  It is usually associated with nausea and lightheadedness.  There is no associated vomiting diaphoresis or shortness of breath.  He denies having shortness of breath at rest or with activity.  He has no exertional chest arm neck jaw shoulder back discomfort.  There is no orthopnea PND or lower extremity edema.  He has no personal history of hypertension diabetes or elevated cholesterol.  He is never had any form of ischemic testing.  The following portions of the patient's history were reviewed and updated as appropriate: allergies, current medications, past family history, past medical  history, past social history, past surgical history and problem  Review of Systems   Constitution: Negative for chills, diaphoresis, fever, weakness, malaise/fatigue, weight gain and weight loss.   HENT: Negative for ear discharge, hearing loss, hoarse voice and nosebleeds.    Eyes: Negative for discharge, double vision, pain and photophobia.   Cardiovascular: Positive for chest pain and palpitations. Negative for claudication, cyanosis, dyspnea on exertion, irregular heartbeat, leg swelling, near-syncope, orthopnea, paroxysmal nocturnal dyspnea and syncope.   Respiratory: Negative for cough, hemoptysis, shortness of breath, sputum production and wheezing.    Endocrine: Negative for cold intolerance, heat intolerance, polydipsia, polyphagia and polyuria.   Hematologic/Lymphatic: Negative for adenopathy and bleeding problem. Does not bruise/bleed easily.   Skin: Negative for color change, flushing, itching and rash.   Musculoskeletal: Negative for muscle cramps, muscle weakness, myalgias and stiffness.   Gastrointestinal: Negative for abdominal pain, diarrhea, hematemesis, hematochezia, nausea and vomiting.   Genitourinary: Negative for dysuria, frequency and nocturia.   Neurological: Negative for dizziness, focal weakness, light-headedness, loss of balance, numbness, paresthesias and seizures.   Psychiatric/Behavioral: Negative for altered mental status, hallucinations and suicidal ideas.   Allergic/Immunologic: Negative for HIV exposure, hives and persistent infections.           Current Outpatient Medications:   •  carvedilol (COREG) 3.125 MG tablet, Take 1 tablet by mouth 2 (Two) Times a Day With Meals., Disp: 60 tablet, Rfl: 11  •  gabapentin (NEURONTIN) 800 MG tablet, Take 800 mg by mouth 3 (Three) Times a Day., Disp: , Rfl: 0  •  Lurasidone HCl (LATUDA) 80 MG tablet, Take 60 mg by mouth Every Night. Patient taking 1/2 tablet at night for a total of 60 mg. , Disp: , Rfl:   •  pantoprazole (PROTONIX) 40 MG EC  "tablet, Take 40 mg by mouth Daily., Disp: , Rfl: 0    Objective:   Physical Exam   Constitutional: He is oriented to person, place, and time. He appears well-developed and well-nourished. No distress.   HENT:   Head: Normocephalic and atraumatic.   Mouth/Throat: Oropharynx is clear and moist.   Eyes: Conjunctivae and EOM are normal. Pupils are equal, round, and reactive to light. No scleral icterus.   Neck: Normal range of motion. Neck supple. No JVD present. No tracheal deviation present. No thyromegaly present.   Cardiovascular: Normal rate, regular rhythm, S1 normal, S2 normal, normal heart sounds, intact distal pulses and normal pulses. PMI is not displaced. Exam reveals no gallop and no friction rub.   No murmur heard.  Pulmonary/Chest: Effort normal and breath sounds normal. No stridor. No respiratory distress. He has no wheezes. He has no rales.   Abdominal: Soft. Bowel sounds are normal. He exhibits no distension and no mass. There is no tenderness. There is no rebound and no guarding.   Musculoskeletal: Normal range of motion. He exhibits no edema or deformity.   Neurological: He is alert and oriented to person, place, and time. He displays normal reflexes. No cranial nerve deficit. Coordination normal.   Skin: Skin is warm and dry. No rash noted. He is not diaphoretic. No erythema.   Psychiatric: He has a normal mood and affect. His behavior is normal. Thought content normal.     Blood pressure 112/80, pulse 96, height 177.8 cm (70\"), weight 74.8 kg (164 lb 12.8 oz), SpO2 97 %.   Lab Review:     Assessment:       1. Severe chronic obstructive pulmonary disease (CMS/HCC)  Underlying tobacco related lung disease.    2. Essential hypertension  Excellent blood pressure control.    3. Palpitations  Some of the patient's symptoms could be due to underlying arrhythmia and/or ectopy.  She has never worn an outpatient heart monitor.    4. Precordial pain  The patient's chest discomfort has features both typical and " atypical for coronary insufficiency.  The patient has never had an ischemic evaluation.  The patient has multiple risk factors for coronary artery disease.  The patient is able to do treadmill exercise stress testing.    5. Tobacco dependence syndrome  The patient indicates that he is currently using electronic cigarettes in place of tobacco based cigarettes.      ECG 12 Lead  Date/Time: 12/4/2019 3:13 PM  Performed by: Trae Gonzalez MD  Authorized by: Trae Gonzalez MD   Previous ECG: no previous ECG available  Rhythm: sinus rhythm  Rate: normal  QRS axis: normal    Clinical impression: non-specific ECG  Comments: rSr' in V1-V2            Plan:     The patient has been counseled regarding the dangers of using electronic cigarettes based on recent evidence widely reported in the media.  I have recommended a treadmill exercise stress test as well as an echocardiogram.  I have recommended an outpatient cardiac monitor.  No changes in his medication therapy have been made at today's visit.  Further recommendations will be predicated on the results of his outpatient testing.

## 2019-12-05 ENCOUNTER — OFFICE VISIT (OUTPATIENT)
Dept: FAMILY MEDICINE CLINIC | Age: 44
End: 2019-12-05
Payer: COMMERCIAL

## 2019-12-05 VITALS
RESPIRATION RATE: 14 BRPM | DIASTOLIC BLOOD PRESSURE: 80 MMHG | TEMPERATURE: 98.6 F | BODY MASS INDEX: 24.3 KG/M2 | HEART RATE: 90 BPM | HEIGHT: 72 IN | OXYGEN SATURATION: 98 % | SYSTOLIC BLOOD PRESSURE: 122 MMHG | WEIGHT: 179.4 LBS

## 2019-12-05 DIAGNOSIS — F41.9 ANXIETY: ICD-10-CM

## 2019-12-05 DIAGNOSIS — Z63.4 GRIEF AT LOSS OF CHILD: Primary | ICD-10-CM

## 2019-12-05 DIAGNOSIS — F43.21 GRIEF AT LOSS OF CHILD: Primary | ICD-10-CM

## 2019-12-05 DIAGNOSIS — F33.2 SEVERE EPISODE OF RECURRENT MAJOR DEPRESSIVE DISORDER, WITHOUT PSYCHOTIC FEATURES (HCC): ICD-10-CM

## 2019-12-05 PROCEDURE — 99213 OFFICE O/P EST LOW 20 MIN: CPT | Performed by: NURSE PRACTITIONER

## 2019-12-05 RX ORDER — LORAZEPAM 0.5 MG/1
0.5 TABLET ORAL EVERY 8 HOURS PRN
Qty: 30 TABLET | Refills: 0 | Status: SHIPPED | OUTPATIENT
Start: 2019-12-05 | End: 2019-12-15

## 2019-12-05 SDOH — SOCIAL STABILITY - SOCIAL INSECURITY: DISSAPEARANCE AND DEATH OF FAMILY MEMBER: Z63.4

## 2019-12-05 ASSESSMENT — ENCOUNTER SYMPTOMS
NAUSEA: 0
CONSTIPATION: 0
DIARRHEA: 0
COUGH: 0
VOMITING: 0
WHEEZING: 0
SHORTNESS OF BREATH: 0

## 2020-01-07 ENCOUNTER — OFFICE VISIT (OUTPATIENT)
Dept: FAMILY MEDICINE CLINIC | Age: 45
End: 2020-01-07
Payer: COMMERCIAL

## 2020-01-07 ENCOUNTER — HOSPITAL ENCOUNTER (OUTPATIENT)
Age: 45
Discharge: HOME | End: 2020-01-09
Payer: COMMERCIAL

## 2020-01-07 VITALS
DIASTOLIC BLOOD PRESSURE: 82 MMHG | HEIGHT: 72 IN | TEMPERATURE: 98 F | SYSTOLIC BLOOD PRESSURE: 122 MMHG | WEIGHT: 185.5 LBS | OXYGEN SATURATION: 98 % | BODY MASS INDEX: 25.12 KG/M2 | HEART RATE: 81 BPM

## 2020-01-07 LAB — TSH SERPL DL<=0.05 MIU/L-ACNC: 2.73 UIU/ML (ref 0.27–4.2)

## 2020-01-07 PROCEDURE — 99213 OFFICE O/P EST LOW 20 MIN: CPT | Performed by: NURSE PRACTITIONER

## 2020-01-07 PROCEDURE — 84439 ASSAY OF FREE THYROXINE: CPT

## 2020-01-07 PROCEDURE — 84443 ASSAY THYROID STIM HORMONE: CPT

## 2020-01-07 RX ORDER — OSELTAMIVIR PHOSPHATE 75 MG/1
75 CAPSULE ORAL 2 TIMES DAILY
Qty: 10 CAPSULE | Refills: 0 | Status: SHIPPED | OUTPATIENT
Start: 2020-01-07 | End: 2020-01-12

## 2020-01-07 ASSESSMENT — PATIENT HEALTH QUESTIONNAIRE - PHQ9
5. POOR APPETITE OR OVEREATING: 1
SUM OF ALL RESPONSES TO PHQ QUESTIONS 1-9: 16
9. THOUGHTS THAT YOU WOULD BE BETTER OFF DEAD, OR OF HURTING YOURSELF: 0
3. TROUBLE FALLING OR STAYING ASLEEP: 3
6. FEELING BAD ABOUT YOURSELF - OR THAT YOU ARE A FAILURE OR HAVE LET YOURSELF OR YOUR FAMILY DOWN: 1
4. FEELING TIRED OR HAVING LITTLE ENERGY: 3
SUM OF ALL RESPONSES TO PHQ QUESTIONS 1-9: 16
10. IF YOU CHECKED OFF ANY PROBLEMS, HOW DIFFICULT HAVE THESE PROBLEMS MADE IT FOR YOU TO DO YOUR WORK, TAKE CARE OF THINGS AT HOME, OR GET ALONG WITH OTHER PEOPLE: 2
7. TROUBLE CONCENTRATING ON THINGS, SUCH AS READING THE NEWSPAPER OR WATCHING TELEVISION: 2
2. FEELING DOWN, DEPRESSED OR HOPELESS: 3
8. MOVING OR SPEAKING SO SLOWLY THAT OTHER PEOPLE COULD HAVE NOTICED. OR THE OPPOSITE, BEING SO FIGETY OR RESTLESS THAT YOU HAVE BEEN MOVING AROUND A LOT MORE THAN USUAL: 0
1. LITTLE INTEREST OR PLEASURE IN DOING THINGS: 3
SUM OF ALL RESPONSES TO PHQ9 QUESTIONS 1 & 2: 6

## 2020-01-07 ASSESSMENT — ENCOUNTER SYMPTOMS
WHEEZING: 0
DIARRHEA: 0
COUGH: 0
NAUSEA: 0
CONSTIPATION: 0
SHORTNESS OF BREATH: 0
VOMITING: 0

## 2020-01-07 NOTE — PROGRESS NOTES
HPI:  Patient comes in today for   Chief Complaint   Patient presents with    Depression     1 MONTH F/U   . Feels like he got improvement from the last dose increase and has leveled off again. Feels like now when he has a down day he can get out of it faster. Made it through [place], stayed home. Prior to Visit Medications    Medication Sig Taking? Authorizing Provider   oseltamivir (TAMIFLU) 75 MG capsule Take 1 capsule by mouth 2 times daily for 5 days Yes Freya Stark, APRN - CNP   escitalopram (LEXAPRO) 20 MG tablet Take 1 tablet by mouth daily Yes Freya Stark APRN - CNP   busPIRone (BUSPAR) 10 MG tablet 10 mg 3 times daily as needed  Yes Historical Provider, MD   SUMAtriptan (IMITREX) 100 MG tablet  Yes Historical Provider, MD   diazepam (VALIUM) 2 MG tablet Take 2 mg by mouth every 6 hours as needed for Anxiety. . Yes Historical Provider, MD   meclizine (ANTIVERT) 25 MG tablet Take 1 tablet by mouth 3 times daily as needed for Dizziness Yes Orlena Fairly, DO         Allergies   Allergen Reactions    Erythromycin Nausea And Vomiting         Review of Systems  Review of Systems   Constitutional: Positive for appetite change (decreased) and fatigue. Respiratory: Negative for cough, shortness of breath and wheezing. Cardiovascular: Negative for chest pain and palpitations. Gastrointestinal: Negative for constipation, diarrhea, nausea and vomiting. Heartburn, controlled with OTC Nexium, scheduled with GI   Genitourinary: Negative for difficulty urinating. Neurological: Negative for dizziness, weakness and headaches. Psychiatric/Behavioral: Positive for sleep disturbance (trouble falling asleep and trouble getting up in the morning). The patient is nervous/anxious.          Depression is improving         VS:  /82   Pulse 81   Temp 98 °F (36.7 °C) (Oral)   Ht 6' (1.829 m)   Wt 185 lb 8 oz (84.1 kg)   SpO2 98%   BMI 25.16 kg/m²     Physical Exam  Physical

## 2020-01-08 ENCOUNTER — HOSPITAL ENCOUNTER (OUTPATIENT)
Dept: NON INVASIVE DIAGNOSTICS | Facility: HOSPITAL | Age: 45
Discharge: HOME | End: 2020-01-08
Payer: MEDICARE

## 2020-01-08 ENCOUNTER — OUTSIDE FACILITY SERVICE (OUTPATIENT)
Dept: CARDIOLOGY | Facility: CLINIC | Age: 45
End: 2020-01-08

## 2020-01-08 LAB
LV EF: 55 %
LVEF MODALITY: NORMAL
T4 FREE: 1.16 NG/DL (ref 0.93–1.7)

## 2020-01-08 PROCEDURE — 93018 CV STRESS TEST I&R ONLY: CPT | Performed by: INTERNAL MEDICINE

## 2020-01-08 PROCEDURE — 93017 CV STRESS TEST TRACING ONLY: CPT

## 2020-01-08 PROCEDURE — 93306 TTE W/DOPPLER COMPLETE: CPT

## 2020-01-08 PROCEDURE — 93306 TTE W/DOPPLER COMPLETE: CPT | Performed by: INTERNAL MEDICINE

## 2020-01-09 DIAGNOSIS — R00.2 PALPITATIONS: ICD-10-CM

## 2020-01-09 DIAGNOSIS — R07.2 PRECORDIAL PAIN: ICD-10-CM

## 2020-01-09 DIAGNOSIS — I10 ESSENTIAL HYPERTENSION: ICD-10-CM

## 2020-01-26 ENCOUNTER — HOSPITAL ENCOUNTER (EMERGENCY)
Facility: HOSPITAL | Age: 45
Discharge: HOME | End: 2020-01-26
Attending: HOSPITALIST
Payer: MEDICARE

## 2020-01-26 ENCOUNTER — APPOINTMENT (OUTPATIENT)
Dept: CT IMAGING | Facility: HOSPITAL | Age: 45
End: 2020-01-26
Payer: MEDICARE

## 2020-01-26 VITALS
TEMPERATURE: 98 F | WEIGHT: 155 LBS | DIASTOLIC BLOOD PRESSURE: 96 MMHG | RESPIRATION RATE: 20 BRPM | SYSTOLIC BLOOD PRESSURE: 128 MMHG | BODY MASS INDEX: 21.7 KG/M2 | HEART RATE: 77 BPM | HEIGHT: 71 IN | OXYGEN SATURATION: 99 %

## 2020-01-26 LAB
A/G RATIO: 2 (ref 0.8–2)
ALBUMIN SERPL-MCNC: 4.6 G/DL (ref 3.4–4.8)
ALP BLD-CCNC: 65 U/L (ref 25–100)
ALT SERPL-CCNC: 24 U/L (ref 4–36)
ANION GAP SERPL CALCULATED.3IONS-SCNC: 12 MMOL/L (ref 3–16)
AST SERPL-CCNC: 30 U/L (ref 8–33)
BASOPHILS ABSOLUTE: 0.1 K/UL (ref 0–0.1)
BASOPHILS RELATIVE PERCENT: 1 %
BILIRUB SERPL-MCNC: 1.5 MG/DL (ref 0.3–1.2)
BUN BLDV-MCNC: 13 MG/DL (ref 6–20)
CALCIUM SERPL-MCNC: 9 MG/DL (ref 8.5–10.5)
CHLORIDE BLD-SCNC: 98 MMOL/L (ref 98–107)
CO2: 25 MMOL/L (ref 20–30)
CREAT SERPL-MCNC: 0.9 MG/DL (ref 0.4–1.2)
EOSINOPHILS ABSOLUTE: 0.2 K/UL (ref 0–0.4)
EOSINOPHILS RELATIVE PERCENT: 2.2 %
GFR AFRICAN AMERICAN: >59
GFR NON-AFRICAN AMERICAN: >59
GLOBULIN: 2.3 G/DL
GLUCOSE BLD-MCNC: 116 MG/DL (ref 74–106)
HCT VFR BLD CALC: 40 % (ref 40–54)
HEMOGLOBIN: 13.9 G/DL (ref 13–18)
IMMATURE GRANULOCYTES #: 0 K/UL
IMMATURE GRANULOCYTES %: 0.4 % (ref 0–5)
LACTIC ACID: 2 MMOL/L (ref 0.4–2)
LIPASE: 35 U/L (ref 5.6–51.3)
LYMPHOCYTES ABSOLUTE: 1.6 K/UL (ref 1.5–4)
LYMPHOCYTES RELATIVE PERCENT: 24.3 %
MCH RBC QN AUTO: 34.2 PG (ref 27–32)
MCHC RBC AUTO-ENTMCNC: 34.8 G/DL (ref 31–35)
MCV RBC AUTO: 98.5 FL (ref 80–100)
MONOCYTES ABSOLUTE: 0.6 K/UL (ref 0.2–0.8)
MONOCYTES RELATIVE PERCENT: 8.9 %
NEUTROPHILS ABSOLUTE: 4.2 K/UL (ref 2–7.5)
NEUTROPHILS RELATIVE PERCENT: 63.2 %
PDW BLD-RTO: 12.1 % (ref 11–16)
PLATELET # BLD: 311 K/UL (ref 150–400)
PMV BLD AUTO: 9.1 FL (ref 6–10)
POTASSIUM REFLEX MAGNESIUM: 3.6 MMOL/L (ref 3.4–5.1)
RAPID INFLUENZA  B AGN: NEGATIVE
RAPID INFLUENZA A AGN: NEGATIVE
RBC # BLD: 4.06 M/UL (ref 4.5–6)
SODIUM BLD-SCNC: 135 MMOL/L (ref 136–145)
TOTAL PROTEIN: 6.9 G/DL (ref 6.4–8.3)
WBC # BLD: 6.7 K/UL (ref 4–11)

## 2020-01-26 PROCEDURE — 2580000003 HC RX 258: Performed by: HOSPITALIST

## 2020-01-26 PROCEDURE — 83605 ASSAY OF LACTIC ACID: CPT

## 2020-01-26 PROCEDURE — 87804 INFLUENZA ASSAY W/OPTIC: CPT

## 2020-01-26 PROCEDURE — 83690 ASSAY OF LIPASE: CPT

## 2020-01-26 PROCEDURE — 6370000000 HC RX 637 (ALT 250 FOR IP): Performed by: HOSPITALIST

## 2020-01-26 PROCEDURE — 6360000002 HC RX W HCPCS: Performed by: HOSPITALIST

## 2020-01-26 PROCEDURE — 99284 EMERGENCY DEPT VISIT MOD MDM: CPT

## 2020-01-26 PROCEDURE — 85025 COMPLETE CBC W/AUTO DIFF WBC: CPT

## 2020-01-26 PROCEDURE — 80053 COMPREHEN METABOLIC PANEL: CPT

## 2020-01-26 PROCEDURE — 74176 CT ABD & PELVIS W/O CONTRAST: CPT

## 2020-01-26 PROCEDURE — 96374 THER/PROPH/DIAG INJ IV PUSH: CPT

## 2020-01-26 PROCEDURE — 96375 TX/PRO/DX INJ NEW DRUG ADDON: CPT

## 2020-01-26 PROCEDURE — 96361 HYDRATE IV INFUSION ADD-ON: CPT

## 2020-01-26 RX ORDER — ONDANSETRON 4 MG/1
4 TABLET, ORALLY DISINTEGRATING ORAL EVERY 4 HOURS PRN
Qty: 15 TABLET | Refills: 0 | Status: SHIPPED | OUTPATIENT
Start: 2020-01-26 | End: 2020-11-12

## 2020-01-26 RX ORDER — SIMETHICONE 80 MG
80 TABLET,CHEWABLE ORAL EVERY 6 HOURS PRN
Qty: 30 TABLET | Refills: 0 | Status: SHIPPED | OUTPATIENT
Start: 2020-01-26 | End: 2020-11-12

## 2020-01-26 RX ORDER — SIMETHICONE 80 MG
160 TABLET,CHEWABLE ORAL ONCE
Status: COMPLETED | OUTPATIENT
Start: 2020-01-26 | End: 2020-01-26

## 2020-01-26 RX ORDER — ONDANSETRON 2 MG/ML
4 INJECTION INTRAMUSCULAR; INTRAVENOUS EVERY 30 MIN PRN
Status: DISCONTINUED | OUTPATIENT
Start: 2020-01-26 | End: 2020-01-26 | Stop reason: HOSPADM

## 2020-01-26 RX ORDER — MORPHINE SULFATE 2 MG/ML
2 INJECTION, SOLUTION INTRAMUSCULAR; INTRAVENOUS EVERY 30 MIN PRN
Status: DISCONTINUED | OUTPATIENT
Start: 2020-01-26 | End: 2020-01-26 | Stop reason: HOSPADM

## 2020-01-26 RX ORDER — PANTOPRAZOLE SODIUM 40 MG/1
40 TABLET, DELAYED RELEASE ORAL DAILY
COMMUNITY
End: 2020-11-12

## 2020-01-26 RX ORDER — 0.9 % SODIUM CHLORIDE 0.9 %
1000 INTRAVENOUS SOLUTION INTRAVENOUS ONCE
Status: COMPLETED | OUTPATIENT
Start: 2020-01-26 | End: 2020-01-26

## 2020-01-26 RX ORDER — OXYCODONE HYDROCHLORIDE AND ACETAMINOPHEN 5; 325 MG/1; MG/1
1 TABLET ORAL EVERY 6 HOURS PRN
Qty: 12 TABLET | Refills: 0 | Status: SHIPPED | OUTPATIENT
Start: 2020-01-26 | End: 2020-01-29

## 2020-01-26 RX ADMIN — SIMETHICONE CHEW TAB 80 MG 160 MG: 80 TABLET ORAL at 10:53

## 2020-01-26 RX ADMIN — ONDANSETRON 4 MG: 2 INJECTION INTRAMUSCULAR; INTRAVENOUS at 10:51

## 2020-01-26 RX ADMIN — MORPHINE SULFATE 2 MG: 2 INJECTION, SOLUTION INTRAMUSCULAR; INTRAVENOUS at 10:50

## 2020-01-26 RX ADMIN — SODIUM CHLORIDE 1000 ML: 9 INJECTION, SOLUTION INTRAVENOUS at 10:50

## 2020-01-26 ASSESSMENT — PAIN DESCRIPTION - DESCRIPTORS
DESCRIPTORS: ACHING
DESCRIPTORS: SHARP

## 2020-01-26 ASSESSMENT — PAIN DESCRIPTION - LOCATION
LOCATION: ABDOMEN
LOCATION: ABDOMEN

## 2020-01-26 ASSESSMENT — PAIN DESCRIPTION - PAIN TYPE
TYPE: ACUTE PAIN
TYPE: ACUTE PAIN;CHRONIC PAIN

## 2020-01-26 ASSESSMENT — PAIN DESCRIPTION - FREQUENCY: FREQUENCY: CONTINUOUS

## 2020-01-26 ASSESSMENT — PAIN DESCRIPTION - ORIENTATION: ORIENTATION: RIGHT;LEFT;MID;UPPER

## 2020-01-26 ASSESSMENT — PAIN DESCRIPTION - PROGRESSION
CLINICAL_PROGRESSION: GRADUALLY WORSENING
CLINICAL_PROGRESSION: GRADUALLY IMPROVING

## 2020-01-26 ASSESSMENT — PAIN SCALES - GENERAL
PAINLEVEL_OUTOF10: 10
PAINLEVEL_OUTOF10: 10
PAINLEVEL_OUTOF10: 8

## 2020-01-26 ASSESSMENT — PAIN DESCRIPTION - ONSET: ONSET: ON-GOING

## 2020-01-30 ENCOUNTER — OFFICE VISIT (OUTPATIENT)
Dept: INTERNAL MEDICINE | Facility: CLINIC | Age: 45
End: 2020-01-30

## 2020-01-30 VITALS
OXYGEN SATURATION: 100 % | TEMPERATURE: 98 F | RESPIRATION RATE: 16 BRPM | HEIGHT: 70 IN | HEART RATE: 114 BPM | DIASTOLIC BLOOD PRESSURE: 109 MMHG | WEIGHT: 159.8 LBS | BODY MASS INDEX: 22.88 KG/M2 | SYSTOLIC BLOOD PRESSURE: 149 MMHG

## 2020-01-30 DIAGNOSIS — R19.7 DIARRHEA, UNSPECIFIED TYPE: ICD-10-CM

## 2020-01-30 DIAGNOSIS — R10.13 EPIGASTRIC PAIN: Primary | ICD-10-CM

## 2020-01-30 PROCEDURE — 99214 OFFICE O/P EST MOD 30 MIN: CPT | Performed by: INTERNAL MEDICINE

## 2020-01-30 RX ORDER — CARVEDILOL 6.25 MG/1
6.25 TABLET ORAL 2 TIMES DAILY WITH MEALS
Qty: 60 TABLET | Refills: 11 | Status: SHIPPED | OUTPATIENT
Start: 2020-01-30 | End: 2020-02-18 | Stop reason: SDUPTHER

## 2020-01-30 RX ORDER — ONDANSETRON 4 MG/1
4 TABLET, ORALLY DISINTEGRATING ORAL
COMMUNITY
Start: 2020-01-26 | End: 2022-02-15

## 2020-01-30 RX ORDER — OXYCODONE HYDROCHLORIDE AND ACETAMINOPHEN 5; 325 MG/1; MG/1
TABLET ORAL
COMMUNITY
Start: 2020-01-26 | End: 2020-01-30

## 2020-01-30 RX ORDER — SUCRALFATE 1 G/1
1 TABLET ORAL 4 TIMES DAILY
Qty: 120 TABLET | Refills: 1 | Status: ON HOLD | OUTPATIENT
Start: 2020-01-30 | End: 2022-02-15

## 2020-01-30 RX ORDER — SIMETHICONE 80 MG
80 TABLET,CHEWABLE ORAL
COMMUNITY
Start: 2020-01-26 | End: 2020-02-25

## 2020-01-30 NOTE — PROGRESS NOTES
Subjective     Patient ID: Douglas Tan is a 44 y.o. male. Patient is here for management of multiple medical problems.     Chief Complaint   Patient presents with   • Abdominal Pain     patient having abdominal pain, diarrhea, swelling, increased gas, upper back pain    • Pain referral     patient would like to discuss referral to pain management     History of Present Illness       Diarrhea x 4 months.  3-4 bm's a day.  Yellow diarrhea.   Swelling in ab started after colonoscopy.    Seen at [facility] for nausea and ab pain. Colonoscopy was done for stong family hx of colon can. Pt reports egd done due to pain and nausea. No diarrhea/ bloating at the time.  Last seen at [facility] after the diarrhea started.  Unsure what doctor he saw. Not had a follow up apt.  Was to get a HIDA scan and missed apt.  Pt never f/u on it.    Pt doesn't recal us GB.  Possibly was to have gastric emptying scan.      Has ct ab at [facility]. Last Sunday.      The following portions of the patient's history were reviewed and updated as appropriate: allergies, current medications, past family history, past medical history, past social history, past surgical history and problem list.    Review of Systems   Gastrointestinal: Positive for abdominal distention, abdominal pain, diarrhea and nausea. Negative for constipation, rectal pain and vomiting.   Musculoskeletal: Negative for arthralgias, back pain and neck pain.   Psychiatric/Behavioral: Negative for self-injury and sleep disturbance.   All other systems reviewed and are negative.      Current Outpatient Medications:   •  gabapentin (NEURONTIN) 800 MG tablet, Take 800 mg by mouth 3 (Three) Times a Day., Disp: , Rfl: 0  •  Lurasidone HCl (LATUDA) 80 MG tablet, Take 60 mg by mouth Every Night. Patient taking 1/2 tablet at night for a total of 60 mg. , Disp: , Rfl:   •  ondansetron ODT (ZOFRAN-ODT) 4 MG disintegrating tablet, Take 4 mg by mouth., Disp: , Rfl:   •  pantoprazole (PROTONIX) 40 MG EC  "tablet, Take 40 mg by mouth Daily., Disp: , Rfl: 0  •  simethicone (MYLICON) 80 MG chewable tablet, Chew 80 mg., Disp: , Rfl:   •  carvedilol (COREG) 6.25 MG tablet, Take 1 tablet by mouth 2 (Two) Times a Day With Meals., Disp: 60 tablet, Rfl: 11  •  sucralfate (CARAFATE) 1 g tablet, Take 1 tablet by mouth 4 (Four) Times a Day., Disp: 120 tablet, Rfl: 1    Objective      Blood pressure (!) 149/109, pulse 114, temperature 98 °F (36.7 °C), resp. rate 16, height 177.8 cm (70\"), weight 72.5 kg (159 lb 12.8 oz), SpO2 100 %.    Physical Exam     General Appearance:    Alert, cooperative, no distress, appears stated age   Head:    Normocephalic, without obvious abnormality, atraumatic   Eyes:    PERRL, conjunctiva/corneas clear, EOM's intact   Ears:    Normal TM's and external ear canals, both ears   Nose:   Nares normal, septum midline, mucosa normal, no drainage   or sinus tenderness   Throat:   Lips, mucosa, and tongue normal; teeth and gums normal   Neck:   Supple, symmetrical, trachea midline, no adenopathy;        thyroid:  No enlargement/tenderness/nodules; no carotid    bruit or JVD   Back:     Symmetric, no curvature, ROM normal, no CVA tenderness   Lungs:     Clear to auscultation bilaterally, respirations unlabored   Chest wall:    No tenderness or deformity   Heart:    Regular rate and rhythm, S1 and S2 normal, no murmur,        rub or gallop   Abdomen:     Soft,-tender mid epigastric and umbilical area. No rebound neg obturator. Neg psoas. bowel sounds active all four quadrants,     no masses, no organomegaly   Extremities:   Extremities normal, atraumatic, no cyanosis or edema   Pulses:   2+ and symmetric all extremities   Skin:   Skin color, texture, turgor normal, no rashes or lesions   Lymph nodes:   Cervical, supraclavicular, and axillary nodes normal   Neurologic:   CNII-XII intact. Normal strength, sensation and reflexes       throughout      Results for orders placed or performed in visit on 11/18/19 "   Lipid Panel   Result Value Ref Range    Total Cholesterol 210 (H) 0 - 200 mg/dL    Triglycerides 94 0 - 150 mg/dL    HDL Cholesterol 89 (H) 40 - 60 mg/dL    VLDL Cholesterol 18.8 mg/dL    LDL Cholesterol  102 (H) 0 - 100 mg/dL   Vitamin B12   Result Value Ref Range    Vitamin B-12 759 211 - 946 pg/mL   Comprehensive Metabolic Panel   Result Value Ref Range    Glucose 91 65 - 99 mg/dL    BUN 7 6 - 20 mg/dL    Creatinine 1.03 0.76 - 1.27 mg/dL    eGFR Non African Am 80 >60 mL/min/1.73    eGFR African Am 96 >60 mL/min/1.73    BUN/Creatinine Ratio 6.8 (L) 7.0 - 25.0    Sodium 140 136 - 145 mmol/L    Potassium 4.4 3.5 - 5.2 mmol/L    Chloride 99 98 - 107 mmol/L    Total CO2 26.7 22.0 - 29.0 mmol/L    Calcium 9.4 8.6 - 10.5 mg/dL    Total Protein 6.7 6.0 - 8.5 g/dL    Albumin 4.70 3.50 - 5.20 g/dL    Globulin 2.0 gm/dL    A/G Ratio 2.4 g/dL    Total Bilirubin 0.6 0.2 - 1.2 mg/dL    Alkaline Phosphatase 73 39 - 117 U/L    AST (SGOT) 37 1 - 40 U/L    ALT (SGPT) 136 (H) 1 - 41 U/L   TSH   Result Value Ref Range    TSH 0.869 0.270 - 4.200 uIU/mL   T4, Free   Result Value Ref Range    Free T4 1.39 0.93 - 1.70 ng/dL   Endomysial Antibody, IgA / IGG Titer   Result Value Ref Range    Endomysial Antibody Titer IgA <1:2.5 titer    Endomysial Antibody Titer IgG <1:2.5 titer   Glia(IgA / G) & TTG(IgA / G)   Result Value Ref Range    Gliadin Deamidated Peptide Ab, IgA 5 0 - 19 units    Deaminated Gliadin Ab IgG 2 0 - 19 units    Tissue Transglutaminase IgA <2 0 - 3 U/mL    Tissue Transglutaminase IgG <2 0 - 5 U/mL   H. Pylori Antibody, IgA   Result Value Ref Range    H. pylori, IgA ABS <9.0 0.0 - 8.9 units   H. Pylori Antibody, IgG   Result Value Ref Range    H. pylori IgG 0.42 0.00 - 0.79 Index Value   [place] Spotted Fever, IgM   Result Value Ref Range    RMSF IgM 0.53 0.00 - 0.89 index   [facility] Spotted Fever, IgG   Result Value Ref Range    RMSF IgG Negative Negative   Lyme Disease, PCR   Result Value Ref Range    Lyme  Disease(B.burgdorferi)PCR Negative Negative   Ehrlichia Antibody Panel   Result Value Ref Range    E. chaffeensis (HME) IgG Titer Negative Neg:<1:64    E. chaffeensis (HME) IgM Titer Negative Neg:<1:20    HGE IgG Titer Negative Neg:<1:64    HGE IgM Titer Negative Neg:<1:20   Cyclic Citrul Peptide Antibody, IgG / IgA   Result Value Ref Range    CCP Antibodies IgG/IgA 6 0 - 19 units   Rheumatoid Factor   Result Value Ref Range    RA Latex Turbid <10.0 0.0 - 13.9 IU/mL   Sedimentation Rate   Result Value Ref Range    Sed Rate 3 0 - 15 mm/hr   Uric Acid   Result Value Ref Range    Uric Acid 4.9 3.4 - 7.0 mg/dL   C-reactive Protein   Result Value Ref Range    C-Reactive Protein 0.18 0.00 - 0.50 mg/dL   Antistreptolysin O Titer   Result Value Ref Range    .1 0.0 - 200.0 IU/mL   CK   Result Value Ref Range    Creatine Kinase 63 20 - 200 U/L   Myoglobin, Serum   Result Value Ref Range    Myoglobin <21.0 (L) 28.0 - 72.0 ng/mL   CBC & Differential   Result Value Ref Range    WBC 6.59 3.40 - 10.80 10*3/mm3    RBC 4.49 4.14 - 5.80 10*6/mm3    Hemoglobin 15.3 13.0 - 17.7 g/dL    Hematocrit 43.0 37.5 - 51.0 %    MCV 95.8 79.0 - 97.0 fL    MCH 34.1 (H) 26.6 - 33.0 pg    MCHC 35.6 31.5 - 35.7 g/dL    RDW 13.4 12.3 - 15.4 %    Platelets 281 140 - 450 10*3/mm3    Neutrophil Rel % 67.4 42.7 - 76.0 %    Lymphocyte Rel % 22.8 19.6 - 45.3 %    Monocyte Rel % 6.8 5.0 - 12.0 %    Eosinophil Rel % 1.8 0.3 - 6.2 %    Basophil Rel % 0.9 0.0 - 1.5 %    Neutrophils Absolute 4.44 1.70 - 7.00 10*3/mm3    Lymphocytes Absolute 1.50 0.70 - 3.10 10*3/mm3    Monocytes Absolute 0.45 0.10 - 0.90 10*3/mm3    Eosinophils Absolute 0.12 0.00 - 0.40 10*3/mm3    Basophils Absolute 0.06 0.00 - 0.20 10*3/mm3    Immature Granulocyte Rel % 0.3 0.0 - 0.5 %    Immature Grans Absolute 0.02 0.00 - 0.05 10*3/mm3    nRBC 0.0 0.0 - 0.2 /100 WBC         Assessment/Plan   Consider VIP level if neg w/u.    Pt asking for opiate pain meds for the pain.  This is not  and option and is contraindicated. I told the pt I will be willing to figure out what is wrong and fix him but opiates are not an option.    Will try to get [facility] medical records from ER last Sunday.      Douglas was seen today for abdominal pain and pain referral.    Diagnoses and all orders for this visit:    Epigastric pain  -     Comprehensive Metabolic Panel  -     Vitamin B12  -     CBC & Differential  -     Lipase  -     H. Pylori Antibody, IgG  -     H. Pylori Antibody, IgA  -     US Gallbladder; Future  -     Gastrin  -     5 HIAA, Urine, Quantitative, Random - Urine, Clean Catch  -     IgG  -     IgM  -     IgA  -     HIV-1 / O / 2 Ag / Antibody 4th Generation  -     Hepatitis Panel, Acute  -     Fecal Leukocytes - Stool, Per Rectum  -     Gastrointestinal Panel, PCR - Stool, Per Rectum  -     H. Pylori Antigen, Stool - Stool, Per Rectum  -     Clostridium Difficile EIA - Stool, Per Rectum    Diarrhea, unspecified type  -     Comprehensive Metabolic Panel  -     Vitamin B12  -     CBC & Differential  -     Lipase  -     H. Pylori Antibody, IgG  -     H. Pylori Antibody, IgA  -     US Gallbladder; Future  -     Gastrin  -     5 HIAA, Urine, Quantitative, Random - Urine, Clean Catch  -     IgG  -     IgM  -     IgA  -     HIV-1 / O / 2 Ag / Antibody 4th Generation  -     Hepatitis Panel, Acute  -     Fecal Leukocytes - Stool, Per Rectum  -     Gastrointestinal Panel, PCR - Stool, Per Rectum  -     H. Pylori Antigen, Stool - Stool, Per Rectum  -     Clostridium Difficile EIA - Stool, Per Rectum    Other orders  -     sucralfate (CARAFATE) 1 g tablet; Take 1 tablet by mouth 4 (Four) Times a Day.  -     carvedilol (COREG) 6.25 MG tablet; Take 1 tablet by mouth 2 (Two) Times a Day With Meals.      Return in about 1 week (around 2/6/2020).          There are no Patient Instructions on file for this visit.     Nicolas Salcido MD    Assessment/Plan

## 2020-02-02 LAB
5OH-INDOLEACETATE UR-MCNC: 8.1 MG/L
5OH-INDOLEACETATE/CREAT UR: 2.2 MG/G CREAT (ref 0–6.9)
ALBUMIN SERPL-MCNC: 4.8 G/DL (ref 3.5–5.2)
ALBUMIN/GLOB SERPL: 2.4 G/DL
ALP SERPL-CCNC: 61 U/L (ref 39–117)
ALT SERPL-CCNC: 23 U/L (ref 1–41)
AST SERPL-CCNC: 19 U/L (ref 1–40)
BASOPHILS # BLD AUTO: 0.04 10*3/MM3 (ref 0–0.2)
BASOPHILS NFR BLD AUTO: 0.7 % (ref 0–1.5)
BILIRUB SERPL-MCNC: 0.6 MG/DL (ref 0.2–1.2)
BUN SERPL-MCNC: 11 MG/DL (ref 6–20)
BUN/CREAT SERPL: 10.6 (ref 7–25)
CALCIUM SERPL-MCNC: 9.7 MG/DL (ref 8.6–10.5)
CHLORIDE SERPL-SCNC: 100 MMOL/L (ref 98–107)
CO2 SERPL-SCNC: 27.3 MMOL/L (ref 22–29)
CREAT SERPL-MCNC: 1.04 MG/DL (ref 0.76–1.27)
CREAT UR-MCNC: 375.6 MG/DL
EOSINOPHIL # BLD AUTO: 0.3 10*3/MM3 (ref 0–0.4)
EOSINOPHIL NFR BLD AUTO: 4.9 % (ref 0.3–6.2)
ERYTHROCYTE [DISTWIDTH] IN BLOOD BY AUTOMATED COUNT: 11.9 % (ref 12.3–15.4)
GASTRIN SERPL-MCNC: 43 PG/ML (ref 0–115)
GLOBULIN SER CALC-MCNC: 2 GM/DL
GLUCOSE SERPL-MCNC: 94 MG/DL (ref 65–99)
H PYLORI IGA SER-ACNC: <9 UNITS (ref 0–8.9)
H PYLORI IGG SER IA-ACNC: 0.47 INDEX VALUE (ref 0–0.79)
HAV IGM SERPL QL IA: NEGATIVE
HBV CORE IGM SERPL QL IA: NEGATIVE
HBV SURFACE AG SERPL QL IA: NEGATIVE
HCT VFR BLD AUTO: 41.3 % (ref 37.5–51)
HCV AB S/CO SERPL IA: 0.2 S/CO RATIO (ref 0–0.9)
HGB BLD-MCNC: 14.5 G/DL (ref 13–17.7)
HIV 1+2 AB+HIV1 P24 AG SERPL QL IA: NON REACTIVE
IGA SERPL-MCNC: 146 MG/DL (ref 90–386)
IGG SERPL-MCNC: 695 MG/DL (ref 700–1600)
IGM SERPL-MCNC: 81 MG/DL (ref 20–172)
IMM GRANULOCYTES # BLD AUTO: 0.01 10*3/MM3 (ref 0–0.05)
IMM GRANULOCYTES NFR BLD AUTO: 0.2 % (ref 0–0.5)
LIPASE SERPL-CCNC: 19 U/L (ref 13–60)
LYMPHOCYTES # BLD AUTO: 1.83 10*3/MM3 (ref 0.7–3.1)
LYMPHOCYTES NFR BLD AUTO: 30.1 % (ref 19.6–45.3)
MCH RBC QN AUTO: 34.6 PG (ref 26.6–33)
MCHC RBC AUTO-ENTMCNC: 35.1 G/DL (ref 31.5–35.7)
MCV RBC AUTO: 98.6 FL (ref 79–97)
MONOCYTES # BLD AUTO: 0.38 10*3/MM3 (ref 0.1–0.9)
MONOCYTES NFR BLD AUTO: 6.3 % (ref 5–12)
NEUTROPHILS # BLD AUTO: 3.51 10*3/MM3 (ref 1.7–7)
NEUTROPHILS NFR BLD AUTO: 57.8 % (ref 42.7–76)
NRBC BLD AUTO-RTO: 0 /100 WBC (ref 0–0.2)
PLATELET # BLD AUTO: 283 10*3/MM3 (ref 140–450)
POTASSIUM SERPL-SCNC: 4.2 MMOL/L (ref 3.5–5.2)
PROT SERPL-MCNC: 6.8 G/DL (ref 6–8.5)
RBC # BLD AUTO: 4.19 10*6/MM3 (ref 4.14–5.8)
SODIUM SERPL-SCNC: 139 MMOL/L (ref 136–145)
VIT B12 SERPL-MCNC: 617 PG/ML (ref 211–946)
WBC # BLD AUTO: 6.07 10*3/MM3 (ref 3.4–10.8)

## 2020-02-07 ENCOUNTER — OFFICE VISIT (OUTPATIENT)
Dept: FAMILY MEDICINE CLINIC | Age: 45
End: 2020-02-07
Payer: COMMERCIAL

## 2020-02-07 VITALS
BODY MASS INDEX: 25.38 KG/M2 | DIASTOLIC BLOOD PRESSURE: 78 MMHG | SYSTOLIC BLOOD PRESSURE: 122 MMHG | HEART RATE: 67 BPM | OXYGEN SATURATION: 98 % | TEMPERATURE: 98 F | HEIGHT: 72 IN | RESPIRATION RATE: 14 BRPM | WEIGHT: 187.4 LBS

## 2020-02-07 PROCEDURE — 99213 OFFICE O/P EST LOW 20 MIN: CPT | Performed by: NURSE PRACTITIONER

## 2020-02-07 RX ORDER — BUSPIRONE HYDROCHLORIDE 10 MG/1
10 TABLET ORAL 3 TIMES DAILY
Qty: 90 TABLET | Refills: 2 | Status: SHIPPED | OUTPATIENT
Start: 2020-02-07

## 2020-02-07 ASSESSMENT — ENCOUNTER SYMPTOMS
NAUSEA: 0
COUGH: 0
CONSTIPATION: 0
SHORTNESS OF BREATH: 0
WHEEZING: 0
VOMITING: 0
DIARRHEA: 0

## 2020-02-07 NOTE — PROGRESS NOTES
HPI:  Patient comes in today for   Chief Complaint   Patient presents with    Migraine     under control with the meds. pt needs refill of meds    Elbow Problem     right elbow will lock on him. started a little while ago, but has noticed it recently   . Needs refills for his migraine and Meniere's. Also complains of right elbow pain. Prior to Visit Medications    Medication Sig Taking? Authorizing Provider   busPIRone (BUSPAR) 10 MG tablet Take 1 tablet by mouth 3 times daily Yes ADAN Wetzel - CNP   escitalopram (LEXAPRO) 20 MG tablet Take 1 tablet by mouth daily Yes Freya Stark APRN - CNP   SUMAtriptan (IMITREX) 100 MG tablet  Yes Historical Provider, MD   diazepam (VALIUM) 2 MG tablet Take 2 mg by mouth every 6 hours as needed for Anxiety. . Yes Historical Provider, MD   meclizine (ANTIVERT) 25 MG tablet Take 1 tablet by mouth 3 times daily as needed for Dizziness Yes Jaskaran Omalley, DO         Allergies   Allergen Reactions    Erythromycin Nausea And Vomiting         Review of Systems  Review of Systems   Constitutional: Negative for appetite change and fatigue. Respiratory: Negative for cough, shortness of breath and wheezing. Cardiovascular: Negative for chest pain and palpitations. Gastrointestinal: Negative for constipation, diarrhea, nausea and vomiting. Genitourinary: Negative for difficulty urinating. Neurological: Positive for dizziness (when his Meniere's acts up) and headaches (migraines). Negative for weakness. VS:  /78   Pulse 67   Temp 98 °F (36.7 °C) (Oral)   Resp 14   Ht 6' (1.829 m)   Wt 187 lb 6.4 oz (85 kg)   SpO2 98%   BMI 25.42 kg/m²     Physical Exam  Physical Exam  Constitutional:       Appearance: He is well-developed. HENT:      Head: Normocephalic and atraumatic. Neck:      Thyroid: No thyromegaly. Trachea: No tracheal deviation. Cardiovascular:      Rate and Rhythm: Normal rate and regular rhythm.       Heart

## 2020-02-09 LAB
ADV 40+41 DNA STL QL NAA+NON-PROBE: NOT DETECTED
ASTRO TYP 1-8 RNA STL QL NAA+NON-PROBE: NOT DETECTED
C CAYETANENSIS DNA STL QL NAA+NON-PROBE: NOT DETECTED
C COLI+JEJ+UPSA DNA STL QL NAA+NON-PROBE: NOT DETECTED
C DIF TOX TCDA+TCDB STL QL NAA+NON-PROBE: NOT DETECTED
C DIFF TOX A+B STL QL IA: NEGATIVE
CRYPTOSP DNA STL QL NAA+NON-PROBE: NOT DETECTED
E COLI O157 DNA STL QL NAA+NON-PROBE: NORMAL
E HISTOLYT DNA STL QL NAA+NON-PROBE: NOT DETECTED
EAEC PAA PLAS AGGR+AATA ST NAA+NON-PRB: NOT DETECTED
EC STX1+STX2 GENES STL QL NAA+NON-PROBE: NOT DETECTED
EPEC EAE GENE STL QL NAA+NON-PROBE: NOT DETECTED
ETEC LTA+ST1A+ST1B TOX ST NAA+NON-PROBE: NOT DETECTED
G LAMBLIA DNA STL QL NAA+NON-PROBE: NOT DETECTED
H PYLORI AG STL QL IA: NEGATIVE
NOROVIRUS GI+II RNA STL QL NAA+NON-PROBE: NOT DETECTED
P SHIGELLOIDES DNA STL QL NAA+NON-PROBE: NOT DETECTED
RVA RNA STL QL NAA+NON-PROBE: NOT DETECTED
S ENT+BONG DNA STL QL NAA+NON-PROBE: NOT DETECTED
SAPO I+II+IV+V RNA STL QL NAA+NON-PROBE: NOT DETECTED
SHIGELLA SP+EIEC IPAH ST NAA+NON-PROBE: NOT DETECTED
V CHOL+PARA+VUL DNA STL QL NAA+NON-PROBE: NOT DETECTED
V CHOLERAE DNA STL QL NAA+NON-PROBE: NOT DETECTED
WBC STL QL MICRO: NORMAL
WBC STL QL MICRO: NORMAL
Y ENTEROCOL DNA STL QL NAA+NON-PROBE: NOT DETECTED

## 2020-02-18 ENCOUNTER — OFFICE VISIT (OUTPATIENT)
Dept: INTERNAL MEDICINE | Facility: CLINIC | Age: 45
End: 2020-02-18

## 2020-02-18 ENCOUNTER — HOSPITAL ENCOUNTER (OUTPATIENT)
Dept: GENERAL RADIOLOGY | Facility: HOSPITAL | Age: 45
Discharge: HOME | End: 2020-02-18
Admitting: INTERNAL MEDICINE

## 2020-02-18 VITALS
RESPIRATION RATE: 16 BRPM | HEIGHT: 70 IN | TEMPERATURE: 98.3 F | HEART RATE: 79 BPM | BODY MASS INDEX: 23.45 KG/M2 | SYSTOLIC BLOOD PRESSURE: 156 MMHG | DIASTOLIC BLOOD PRESSURE: 120 MMHG | OXYGEN SATURATION: 100 % | WEIGHT: 163.8 LBS

## 2020-02-18 DIAGNOSIS — M54.9 MULTILEVEL SPINE PAIN: ICD-10-CM

## 2020-02-18 DIAGNOSIS — I10 ESSENTIAL HYPERTENSION: ICD-10-CM

## 2020-02-18 DIAGNOSIS — I10 ESSENTIAL HYPERTENSION: Primary | ICD-10-CM

## 2020-02-18 PROBLEM — R10.13 EPIGASTRIC PAIN: Status: ACTIVE | Noted: 2020-02-18

## 2020-02-18 PROCEDURE — 72100 X-RAY EXAM L-S SPINE 2/3 VWS: CPT

## 2020-02-18 PROCEDURE — 72040 X-RAY EXAM NECK SPINE 2-3 VW: CPT

## 2020-02-18 PROCEDURE — 72072 X-RAY EXAM THORAC SPINE 3VWS: CPT

## 2020-02-18 PROCEDURE — 99214 OFFICE O/P EST MOD 30 MIN: CPT | Performed by: INTERNAL MEDICINE

## 2020-02-18 RX ORDER — BUPROPION HYDROCHLORIDE 150 MG/1
150 TABLET ORAL DAILY
Status: ON HOLD | COMMUNITY
Start: 2020-02-12 | End: 2022-02-15

## 2020-02-18 RX ORDER — CARVEDILOL 12.5 MG/1
12.5 TABLET ORAL 2 TIMES DAILY WITH MEALS
Qty: 60 TABLET | Refills: 11 | Status: SHIPPED | OUTPATIENT
Start: 2020-02-18 | End: 2022-02-15

## 2020-02-18 NOTE — PROGRESS NOTES
Pain   Subjective     Patient ID: Douglas Tan is a 44 y.o. male. Patient is here for management of multiple medical problems.     Chief Complaint   Patient presents with   • Abdominal Pain     follow-up, patient states he is still having the pain on the left side that radiates up into his ribs and sometimes on his right side, patient also states his appetitie is better, and the diarrhea is gone.   • Pain management referral     patient staes he would like to discuss getting a referral to pain management.      History of Present Illness     Diarrhea resolved. Pain in left lower ab. Less pain with diarrhea stopping.  taking sucrate itd to qid. Ab pain improved some.            The following portions of the patient's history were reviewed and updated as appropriate: allergies, current medications, past family history, past medical history, past social history, past surgical history and problem list.    Review of Systems   Constitutional: Positive for fatigue.   Musculoskeletal: Positive for arthralgias, back pain, gait problem, joint swelling and myalgias.   All other systems reviewed and are negative.      Current Outpatient Medications:   •  buPROPion XL (WELLBUTRIN XL) 150 MG 24 hr tablet, Take 150 mg by mouth Daily., Disp: , Rfl:   •  carvedilol (COREG) 6.25 MG tablet, Take 1 tablet by mouth 2 (Two) Times a Day With Meals., Disp: 60 tablet, Rfl: 11  •  gabapentin (NEURONTIN) 800 MG tablet, Take 800 mg by mouth 3 (Three) Times a Day., Disp: , Rfl: 0  •  Lurasidone HCl (LATUDA) 80 MG tablet, Take 60 mg by mouth Every Night. Patient taking 1/2 tablet at night for a total of 60 mg. , Disp: , Rfl:   •  ondansetron ODT (ZOFRAN-ODT) 4 MG disintegrating tablet, Take 4 mg by mouth., Disp: , Rfl:   •  simethicone (MYLICON) 80 MG chewable tablet, Chew 80 mg., Disp: , Rfl:   •  sucralfate (CARAFATE) 1 g tablet, Take 1 tablet by mouth 4 (Four) Times a Day., Disp: 120 tablet, Rfl: 1    Objective      Blood pressure (!)  "156/120, pulse 79, temperature 98.3 °F (36.8 °C), temperature source Oral, resp. rate 16, height 177.8 cm (70\"), weight 74.3 kg (163 lb 12.8 oz), SpO2 100 %.    Physical Exam     General Appearance:    Alert, cooperative, no distress, appears stated age   Head:    Normocephalic, without obvious abnormality, atraumatic   Eyes:    PERRL, conjunctiva/corneas clear, EOM's intact   Ears:    Normal TM's and external ear canals, both ears   Nose:   Nares normal, septum midline, mucosa normal, no drainage   or sinus tenderness   Throat:   Lips, mucosa, and tongue normal; teeth and gums normal   Neck:   Supple, symmetrical, trachea midline, no adenopathy;        thyroid:  No enlargement/tenderness/nodules; no carotid    bruit or JVD   Back:     Symmetric, no curvature, ROM normal, no CVA tenderness   Lungs:     Clear to auscultation bilaterally, respirations unlabored   Chest wall:    No tenderness or deformity   Heart:    Regular rate and rhythm, S1 and S2 normal, no murmur,        rub or gallop   Abdomen:     Soft, non-tender, bowel sounds active all four quadrants,     no masses, no organomegaly   Extremities:   Unusual ttp light palpaiton to his normal appearing spine.   Extremities normal, atraumatic, no cyanosis or edema   Pulses:   2+ and symmetric all extremities   Skin:   Skin color, texture, turgor normal, no rashes or lesions   Lymph nodes:   Cervical, supraclavicular, and axillary nodes normal   Neurologic:   CNII-XII intact. Normal strength, sensation and reflexes       throughout      Results for orders placed or performed in visit on 01/30/20   Fecal Leukocytes - Stool, Per Rectum   Result Value Ref Range    Fecal Leukocytes Final report None Seen    Result 1 Comment    Gastrointestinal Panel, PCR - Stool, Per Rectum   Result Value Ref Range    Campylobacter Not Detected Not Detected    C.difficile toxin A/B Not Detected Not Detected    Plesiomonas shigelloides Not Detected Not Detected    Salmonella Not " Detected Not Detected    Vibrio Not Detected Not Detected    Vibrio cholerae Not Detected Not Detected    Yersinia enterocolitica Not Detected Not Detected    Enteroaggregative E. coli Not Detected Not Detected    Enteropathogenic E. coli Not Detected Not Detected    Enterotoxigenic E. coli Not Detected Not Detected    Shiga-like toxin-producing E. coli  Not Detected Not Detected    E. coli O157 Not applicable Not Detected    Shigella enteroinvasive E. coli Not Detected Not Detected    Cryptosporidium Not Detected Not Detected    Cyclospora cayetanensis Not Detected Not Detected    Entamoeba Histolytica Ag Not Detected Not Detected    Giardia lamblia Not Detected Not Detected    Adenovirus 40/41 Ag Not Detected Not Detected    Astrovirus Not Detected Not Detected    Norovirus GI/GII Not Detected Not Detected    Rotavirus A Not Detected Not Detected    Sapovirus Not Detected Not Detected   Clostridium Difficile EIA - Stool, Per Rectum   Result Value Ref Range    C difficile Toxins A+B, EIA Negative Negative   Comprehensive Metabolic Panel   Result Value Ref Range    Glucose 94 65 - 99 mg/dL    BUN 11 6 - 20 mg/dL    Creatinine 1.04 0.76 - 1.27 mg/dL    eGFR Non African Am 78 >60 mL/min/1.73    eGFR African Am 95 >60 mL/min/1.73    BUN/Creatinine Ratio 10.6 7.0 - 25.0    Sodium 139 136 - 145 mmol/L    Potassium 4.2 3.5 - 5.2 mmol/L    Chloride 100 98 - 107 mmol/L    Total CO2 27.3 22.0 - 29.0 mmol/L    Calcium 9.7 8.6 - 10.5 mg/dL    Total Protein 6.8 6.0 - 8.5 g/dL    Albumin 4.80 3.50 - 5.20 g/dL    Globulin 2.0 gm/dL    A/G Ratio 2.4 g/dL    Total Bilirubin 0.6 0.2 - 1.2 mg/dL    Alkaline Phosphatase 61 39 - 117 U/L    AST (SGOT) 19 1 - 40 U/L    ALT (SGPT) 23 1 - 41 U/L   Vitamin B12   Result Value Ref Range    Vitamin B-12 617 211 - 946 pg/mL   Lipase   Result Value Ref Range    Lipase 19 13 - 60 U/L   H. Pylori Antibody, IgG   Result Value Ref Range    H. pylori IgG 0.47 0.00 - 0.79 Index Value   H. Pylori  Antibody, IgA   Result Value Ref Range    H. pylori, IgA ABS <9.0 0.0 - 8.9 units   Gastrin   Result Value Ref Range    Gastrin 43 0 - 115 pg/mL   5 HIAA, Urine, Quantitative, Random - Urine, Clean Catch   Result Value Ref Range    Creatinine, Urine 375.6 Not Estab. mg/dL    5-HIAA, Urine 8.1 Undefined mg/L    5-HIAA, Urine 2.2 0.0 - 6.9 mg/g Creat   IgG   Result Value Ref Range    IgG 695 (L) 700 - 1,600 mg/dL   IgM   Result Value Ref Range    IgM 81 20 - 172 mg/dL   IgA   Result Value Ref Range    IgA 146 90 - 386 mg/dL   HIV-1 / O / 2 Ag / Antibody 4th Generation   Result Value Ref Range    HIV Screen 4th Gen w/RFX (Reference) Non Reactive Non Reactive   Hepatitis Panel, Acute   Result Value Ref Range    Hep A IgM Negative Negative    Hepatitis B Surface Ag Negative Negative    Hep B Core IgM Negative Negative    Hep C Virus Ab 0.2 0.0 - 0.9 s/co ratio   H. Pylori Antigen, Stool - Stool, Per Rectum   Result Value Ref Range    H pylori Ag, Stl Negative Negative   CBC & Differential   Result Value Ref Range    WBC 6.07 3.40 - 10.80 10*3/mm3    RBC 4.19 4.14 - 5.80 10*6/mm3    Hemoglobin 14.5 13.0 - 17.7 g/dL    Hematocrit 41.3 37.5 - 51.0 %    MCV 98.6 (H) 79.0 - 97.0 fL    MCH 34.6 (H) 26.6 - 33.0 pg    MCHC 35.1 31.5 - 35.7 g/dL    RDW 11.9 (L) 12.3 - 15.4 %    Platelets 283 140 - 450 10*3/mm3    Neutrophil Rel % 57.8 42.7 - 76.0 %    Lymphocyte Rel % 30.1 19.6 - 45.3 %    Monocyte Rel % 6.3 5.0 - 12.0 %    Eosinophil Rel % 4.9 0.3 - 6.2 %    Basophil Rel % 0.7 0.0 - 1.5 %    Neutrophils Absolute 3.51 1.70 - 7.00 10*3/mm3    Lymphocytes Absolute 1.83 0.70 - 3.10 10*3/mm3    Monocytes Absolute 0.38 0.10 - 0.90 10*3/mm3    Eosinophils Absolute 0.30 0.00 - 0.40 10*3/mm3    Basophils Absolute 0.04 0.00 - 0.20 10*3/mm3    Immature Granulocyte Rel % 0.2 0.0 - 0.5 %    Immature Grans Absolute 0.01 0.00 - 0.05 10*3/mm3    nRBC 0.0 0.0 - 0.2 /100 WBC         Assessment/Plan   Using tylenol 500mg 2 pill qid  Take aleve 220  3 pills bid.  D/c nsaids as this is likely the mid epigastric area.    Has tens units. Doesn't like it. Pt wants to go to pain management. Not interested in other management.  Pt wants a pain pump. Not interesed in pills. Pt reports poor quality of life. Not sure degreee of pathology due to lack of studies provied by pt.  Pt request full spine mri.  I'm not able to comply due to lack of neuro dficits.        Douglas was seen today for abdominal pain and pain management referral.    Diagnoses and all orders for this visit:    Essential hypertension  -     XR Spine Cervical 2 or 3 View; Future  -     XR Spine Thoracic 2 View; Future  -     XR Spine Lumbar 2 or 3 View; Future    Multilevel spine pain  -     XR Spine Cervical 2 or 3 View; Future  -     XR Spine Thoracic 2 View; Future  -     XR Spine Lumbar 2 or 3 View; Future      Return in about 4 weeks (around 3/17/2020).          There are no Patient Instructions on file for this visit.     Nicolas Salcido MD    Assessment/Plan

## 2020-02-19 DIAGNOSIS — M47.812 SPONDYLOSIS OF CERVICAL REGION WITHOUT MYELOPATHY OR RADICULOPATHY: Primary | ICD-10-CM

## 2020-02-19 NOTE — PROGRESS NOTES
Please let them know the xr show oa of neck. I will be getting him set up with Dr Almanzar for evaluation.

## 2020-02-24 ENCOUNTER — TELEPHONE (OUTPATIENT)
Dept: FAMILY MEDICINE CLINIC | Age: 45
End: 2020-02-24

## 2020-02-25 ENCOUNTER — OFFICE VISIT (OUTPATIENT)
Dept: INTERNAL MEDICINE | Facility: CLINIC | Age: 45
End: 2020-02-25

## 2020-02-25 VITALS
HEART RATE: 83 BPM | RESPIRATION RATE: 17 BRPM | WEIGHT: 163 LBS | HEIGHT: 70 IN | TEMPERATURE: 98.4 F | BODY MASS INDEX: 23.34 KG/M2 | OXYGEN SATURATION: 97 % | DIASTOLIC BLOOD PRESSURE: 82 MMHG | SYSTOLIC BLOOD PRESSURE: 124 MMHG

## 2020-02-25 DIAGNOSIS — M54.2 NECK PAIN: ICD-10-CM

## 2020-02-25 DIAGNOSIS — G89.29 CHRONIC BILATERAL LOW BACK PAIN WITHOUT SCIATICA: Primary | ICD-10-CM

## 2020-02-25 DIAGNOSIS — M54.50 CHRONIC BILATERAL LOW BACK PAIN WITHOUT SCIATICA: Primary | ICD-10-CM

## 2020-02-25 PROCEDURE — 99214 OFFICE O/P EST MOD 30 MIN: CPT | Performed by: INTERNAL MEDICINE

## 2020-02-25 NOTE — PROGRESS NOTES
"Subjective     Patient ID: Douglas Tan is a 44 y.o. male. Patient is here for management of multiple medical problems.     Chief Complaint   Patient presents with   • Hypertension   • Insomnia     History of Present Illness     Hypertension.  Poor sleep last night. Sleep every 3rd day.  Pain keeps him awake.      Diarrhea improving. Not resolved.  Off nsaids  Sucrafate.   [facility]. EGD/Colonoscopy.  Bloating started after.      The following portions of the patient's history were reviewed and updated as appropriate: allergies, current medications, past family history, past medical history, past social history, past surgical history and problem list.    Review of Systems   Constitutional: Positive for fatigue.   Musculoskeletal: Positive for arthralgias, back pain, joint swelling and myalgias.   Psychiatric/Behavioral: Positive for dysphoric mood and sleep disturbance. The patient is nervous/anxious.    All other systems reviewed and are negative.      Current Outpatient Medications:   •  buPROPion XL (WELLBUTRIN XL) 150 MG 24 hr tablet, Take 150 mg by mouth Daily., Disp: , Rfl:   •  carvedilol (COREG) 12.5 MG tablet, Take 1 tablet by mouth 2 (Two) Times a Day With Meals., Disp: 60 tablet, Rfl: 11  •  gabapentin (NEURONTIN) 800 MG tablet, Take 800 mg by mouth 3 (Three) Times a Day., Disp: , Rfl: 0  •  Lurasidone HCl (LATUDA) 80 MG tablet, Take 60 mg by mouth Every Night. Patient taking 1/2 tablet at night for a total of 60 mg. , Disp: , Rfl:   •  ondansetron ODT (ZOFRAN-ODT) 4 MG disintegrating tablet, Take 4 mg by mouth., Disp: , Rfl:   •  sucralfate (CARAFATE) 1 g tablet, Take 1 tablet by mouth 4 (Four) Times a Day., Disp: 120 tablet, Rfl: 1    Objective      Blood pressure 124/82, pulse 83, temperature 98.4 °F (36.9 °C), resp. rate 17, height 177.8 cm (70\"), weight 73.9 kg (163 lb), SpO2 97 %.    Physical Exam     General Appearance:    Alert, cooperative, no distress, appears stated age   Head:    " Normocephalic, without obvious abnormality, atraumatic   Eyes:    PERRL, conjunctiva/corneas clear, EOM's intact   Ears:    Normal TM's and external ear canals, both ears   Nose:   Nares normal, septum midline, mucosa normal, no drainage   or sinus tenderness   Throat:   Lips, mucosa, and tongue normal; teeth and gums normal   Neck:   Supple, symmetrical, trachea midline, no adenopathy;        thyroid:  No enlargement/tenderness/nodules; no carotid    bruit or JVD   Back:     Symmetric, no curvature, ROM normal, no CVA tenderness   Lungs:     Clear to auscultation bilaterally, respirations unlabored   Chest wall:    No tenderness or deformity   Heart:    Regular rate and rhythm, S1 and S2 normal, no murmur,        rub or gallop   Abdomen:     Soft, non-tender, bowel sounds active all four quadrants,     no masses, no organomegaly   Extremities:   Extremities normal, atraumatic, no cyanosis or edema   Pulses:   2+ and symmetric all extremities   Skin:   Skin color, texture, turgor normal, no rashes or lesions   Lymph nodes:   Cervical, supraclavicular, and axillary nodes normal   Neurologic:   CNII-XII intact. Normal strength, sensation and reflexes       throughout      Results for orders placed or performed in visit on 01/30/20   Fecal Leukocytes - Stool, Per Rectum   Result Value Ref Range    Fecal Leukocytes Final report None Seen    Result 1 Comment    Gastrointestinal Panel, PCR - Stool, Per Rectum   Result Value Ref Range    Campylobacter Not Detected Not Detected    C.difficile toxin A/B Not Detected Not Detected    Plesiomonas shigelloides Not Detected Not Detected    Salmonella Not Detected Not Detected    Vibrio Not Detected Not Detected    Vibrio cholerae Not Detected Not Detected    Yersinia enterocolitica Not Detected Not Detected    Enteroaggregative E. coli Not Detected Not Detected    Enteropathogenic E. coli Not Detected Not Detected    Enterotoxigenic E. coli Not Detected Not Detected    Shiga-like  toxin-producing E. coli  Not Detected Not Detected    E. coli O157 Not applicable Not Detected    Shigella enteroinvasive E. coli Not Detected Not Detected    Cryptosporidium Not Detected Not Detected    Cyclospora cayetanensis Not Detected Not Detected    Entamoeba Histolytica Ag Not Detected Not Detected    Giardia lamblia Not Detected Not Detected    Adenovirus 40/41 Ag Not Detected Not Detected    Astrovirus Not Detected Not Detected    Norovirus GI/GII Not Detected Not Detected    Rotavirus A Not Detected Not Detected    Sapovirus Not Detected Not Detected   Clostridium Difficile EIA - Stool, Per Rectum   Result Value Ref Range    C difficile Toxins A+B, EIA Negative Negative   Comprehensive Metabolic Panel   Result Value Ref Range    Glucose 94 65 - 99 mg/dL    BUN 11 6 - 20 mg/dL    Creatinine 1.04 0.76 - 1.27 mg/dL    eGFR Non African Am 78 >60 mL/min/1.73    eGFR African Am 95 >60 mL/min/1.73    BUN/Creatinine Ratio 10.6 7.0 - 25.0    Sodium 139 136 - 145 mmol/L    Potassium 4.2 3.5 - 5.2 mmol/L    Chloride 100 98 - 107 mmol/L    Total CO2 27.3 22.0 - 29.0 mmol/L    Calcium 9.7 8.6 - 10.5 mg/dL    Total Protein 6.8 6.0 - 8.5 g/dL    Albumin 4.80 3.50 - 5.20 g/dL    Globulin 2.0 gm/dL    A/G Ratio 2.4 g/dL    Total Bilirubin 0.6 0.2 - 1.2 mg/dL    Alkaline Phosphatase 61 39 - 117 U/L    AST (SGOT) 19 1 - 40 U/L    ALT (SGPT) 23 1 - 41 U/L   Vitamin B12   Result Value Ref Range    Vitamin B-12 617 211 - 946 pg/mL   Lipase   Result Value Ref Range    Lipase 19 13 - 60 U/L   H. Pylori Antibody, IgG   Result Value Ref Range    H. pylori IgG 0.47 0.00 - 0.79 Index Value   H. Pylori Antibody, IgA   Result Value Ref Range    H. pylori, IgA ABS <9.0 0.0 - 8.9 units   Gastrin   Result Value Ref Range    Gastrin 43 0 - 115 pg/mL   5 HIAA, Urine, Quantitative, Random - Urine, Clean Catch   Result Value Ref Range    Creatinine, Urine 375.6 Not Estab. mg/dL    5-HIAA, Urine 8.1 Undefined mg/L    5-HIAA, Urine 2.2 0.0 -  6.9 mg/g Creat   IgG   Result Value Ref Range    IgG 695 (L) 700 - 1,600 mg/dL   IgM   Result Value Ref Range    IgM 81 20 - 172 mg/dL   IgA   Result Value Ref Range    IgA 146 90 - 386 mg/dL   HIV-1 / O / 2 Ag / Antibody 4th Generation   Result Value Ref Range    HIV Screen 4th Gen w/RFX (Reference) Non Reactive Non Reactive   Hepatitis Panel, Acute   Result Value Ref Range    Hep A IgM Negative Negative    Hepatitis B Surface Ag Negative Negative    Hep B Core IgM Negative Negative    Hep C Virus Ab 0.2 0.0 - 0.9 s/co ratio   H. Pylori Antigen, Stool - Stool, Per Rectum   Result Value Ref Range    H pylori Ag, Stl Negative Negative   CBC & Differential   Result Value Ref Range    WBC 6.07 3.40 - 10.80 10*3/mm3    RBC 4.19 4.14 - 5.80 10*6/mm3    Hemoglobin 14.5 13.0 - 17.7 g/dL    Hematocrit 41.3 37.5 - 51.0 %    MCV 98.6 (H) 79.0 - 97.0 fL    MCH 34.6 (H) 26.6 - 33.0 pg    MCHC 35.1 31.5 - 35.7 g/dL    RDW 11.9 (L) 12.3 - 15.4 %    Platelets 283 140 - 450 10*3/mm3    Neutrophil Rel % 57.8 42.7 - 76.0 %    Lymphocyte Rel % 30.1 19.6 - 45.3 %    Monocyte Rel % 6.3 5.0 - 12.0 %    Eosinophil Rel % 4.9 0.3 - 6.2 %    Basophil Rel % 0.7 0.0 - 1.5 %    Neutrophils Absolute 3.51 1.70 - 7.00 10*3/mm3    Lymphocytes Absolute 1.83 0.70 - 3.10 10*3/mm3    Monocytes Absolute 0.38 0.10 - 0.90 10*3/mm3    Eosinophils Absolute 0.30 0.00 - 0.40 10*3/mm3    Basophils Absolute 0.04 0.00 - 0.20 10*3/mm3    Immature Granulocyte Rel % 0.2 0.0 - 0.5 %    Immature Grans Absolute 0.01 0.00 - 0.05 10*3/mm3    nRBC 0.0 0.0 - 0.2 /100 WBC         Assessment/Plan   Pt waiting on Dr Almanzar. 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 XR of l spine not consistent with MRI findings. I will defer to Dr Livingston.        Douglas was seen today for hypertension and insomnia.    Diagnoses and all orders for this visit:    Chronic bilateral low back pain without sciatica    Neck pain      Return in  about 6 weeks (around 4/7/2020).          There are no Patient Instructions on file for this visit.     Nicolas Salcido MD    Assessment/Plan

## 2020-02-26 ENCOUNTER — OFFICE VISIT (OUTPATIENT)
Dept: CARDIOLOGY | Facility: CLINIC | Age: 45
End: 2020-02-26

## 2020-02-26 VITALS
SYSTOLIC BLOOD PRESSURE: 112 MMHG | HEART RATE: 90 BPM | OXYGEN SATURATION: 99 % | HEIGHT: 70 IN | DIASTOLIC BLOOD PRESSURE: 80 MMHG | WEIGHT: 163 LBS | BODY MASS INDEX: 23.34 KG/M2

## 2020-02-26 DIAGNOSIS — J44.9 SEVERE CHRONIC OBSTRUCTIVE PULMONARY DISEASE (HCC): ICD-10-CM

## 2020-02-26 DIAGNOSIS — I10 ESSENTIAL HYPERTENSION: Primary | ICD-10-CM

## 2020-02-26 DIAGNOSIS — R00.2 PALPITATIONS: ICD-10-CM

## 2020-02-26 PROBLEM — R56.9 GENERALIZED CONVULSIVE SEIZURE (HCC): Status: RESOLVED | Noted: 2017-10-19 | Resolved: 2020-02-26

## 2020-02-26 PROCEDURE — 99213 OFFICE O/P EST LOW 20 MIN: CPT | Performed by: INTERNAL MEDICINE

## 2020-02-26 NOTE — PROGRESS NOTES
Subjective:     Encounter Date:02/26/2020      Patient ID: Douglas Tan is a 44 y.o. male.    Chief Complaint: Anxiety  HPI  This is a 44-year-old male patient who was initially evaluated for palpitations.  He was ordered to have a treadmill stress test, and echocardiogram and Holter monitor but chose not to have any testing done.  He indicates that his palpitations have largely resolved without intervention.  He feels that all of his symptoms are related to generalized anxiety disorder.  The patient has no chest discomfort at rest or with activity.  There is no exertional chest arm neck jaw shoulder or back discomfort.  There is no orthopnea PND or lower extremity edema.  There is no dizziness palpitations or syncope.  He has no shortness of breath at rest or with activity.  Most of his current symptoms are GI related.  The patient reports that he no longer smokes but uses electronic cigarettes.  The following portions of the patient's history were reviewed and updated as appropriate: allergies, current medications, past family history, past medical history, past social history, past surgical history and problem  Review of Systems   Constitution: Negative for chills, diaphoresis, fever, malaise/fatigue, weight gain and weight loss.   HENT: Negative for ear discharge, hearing loss, hoarse voice and nosebleeds.    Eyes: Negative for discharge, double vision, pain and photophobia.   Cardiovascular: Negative for chest pain, claudication, cyanosis, dyspnea on exertion, irregular heartbeat, leg swelling, near-syncope, orthopnea, palpitations, paroxysmal nocturnal dyspnea and syncope.   Respiratory: Negative for cough, hemoptysis, shortness of breath, sputum production and wheezing.    Endocrine: Negative for cold intolerance, heat intolerance, polydipsia, polyphagia and polyuria.   Hematologic/Lymphatic: Negative for adenopathy and bleeding problem. Does not bruise/bleed easily.   Skin: Negative for color  change, flushing, itching and rash.   Musculoskeletal: Negative for muscle cramps, muscle weakness, myalgias and stiffness.   Gastrointestinal: Positive for nausea. Negative for abdominal pain, diarrhea, hematemesis, hematochezia and vomiting.   Genitourinary: Negative for dysuria, frequency and nocturia.   Neurological: Negative for dizziness, focal weakness, light-headedness, loss of balance, numbness, paresthesias and seizures.   Psychiatric/Behavioral: Negative for altered mental status, hallucinations and suicidal ideas.   Allergic/Immunologic: Negative for HIV exposure, hives and persistent infections.           Current Outpatient Medications:   •  buPROPion XL (WELLBUTRIN XL) 150 MG 24 hr tablet, Take 150 mg by mouth Daily., Disp: , Rfl:   •  carvedilol (COREG) 12.5 MG tablet, Take 1 tablet by mouth 2 (Two) Times a Day With Meals., Disp: 60 tablet, Rfl: 11  •  gabapentin (NEURONTIN) 800 MG tablet, Take 800 mg by mouth 3 (Three) Times a Day., Disp: , Rfl: 0  •  Lurasidone HCl (LATUDA) 80 MG tablet, Take 60 mg by mouth Every Night. Patient taking 1/2 tablet at night for a total of 60 mg. , Disp: , Rfl:   •  ondansetron ODT (ZOFRAN-ODT) 4 MG disintegrating tablet, Take 4 mg by mouth., Disp: , Rfl:   •  sucralfate (CARAFATE) 1 g tablet, Take 1 tablet by mouth 4 (Four) Times a Day., Disp: 120 tablet, Rfl: 1    Objective:   Physical Exam   Constitutional: He is oriented to person, place, and time. He appears well-developed and well-nourished. No distress.   HENT:   Head: Normocephalic and atraumatic.   Mouth/Throat: Oropharynx is clear and moist.   Eyes: Pupils are equal, round, and reactive to light. Conjunctivae and EOM are normal. No scleral icterus.   Neck: Normal range of motion. Neck supple. No JVD present. No tracheal deviation present. No thyromegaly present.   Cardiovascular: Normal rate, regular rhythm, S1 normal, S2 normal, normal heart sounds, intact distal pulses and normal pulses. PMI is not  "displaced. Exam reveals no gallop and no friction rub.   No murmur heard.  Pulmonary/Chest: Effort normal and breath sounds normal. No stridor. No respiratory distress. He has no wheezes. He has no rales.   Abdominal: Soft. Bowel sounds are normal. He exhibits no distension and no mass. There is no tenderness. There is no rebound and no guarding.   Musculoskeletal: Normal range of motion. He exhibits no edema or deformity.   Neurological: He is alert and oriented to person, place, and time. He displays normal reflexes. No cranial nerve deficit. Coordination normal.   Skin: Skin is warm and dry. No rash noted. He is not diaphoretic. No erythema.   Psychiatric: He has a normal mood and affect. His behavior is normal. Thought content normal.     Blood pressure 112/80, pulse 90, height 177.8 cm (70\"), weight 73.9 kg (163 lb), SpO2 99 %.   Lab Review:     Assessment:       1. Essential hypertension  Excellent-improving  Blood pressure control    2. Severe chronic obstructive pulmonary disease (CMS/HCC)  Urgently the patient no longer smokes    3. Palpitations  Symptoms have spontaneously resolved without specific intervention.  The patient chose to forego all recommended cardiac testing.    Procedures    Plan:     The patient has been warned regarding the recently reported dangers of using electronic cigarettes.  He has been cautioned to avoid all forms of nicotine exposure.  The patient does not wish to reschedule any of his cardiac testing at this time.  He is instructed to follow-up with his primary care provider.      "

## 2020-03-12 ENCOUNTER — OFFICE VISIT (OUTPATIENT)
Dept: FAMILY MEDICINE CLINIC | Age: 45
End: 2020-03-12
Payer: COMMERCIAL

## 2020-03-12 VITALS
SYSTOLIC BLOOD PRESSURE: 124 MMHG | DIASTOLIC BLOOD PRESSURE: 86 MMHG | OXYGEN SATURATION: 98 % | HEIGHT: 72 IN | RESPIRATION RATE: 16 BRPM | BODY MASS INDEX: 25.73 KG/M2 | WEIGHT: 190 LBS | HEART RATE: 88 BPM | TEMPERATURE: 98.2 F

## 2020-03-12 PROCEDURE — 99213 OFFICE O/P EST LOW 20 MIN: CPT | Performed by: FAMILY MEDICINE

## 2020-03-12 RX ORDER — PREDNISONE 5 MG/1
TABLET ORAL
Qty: 21 EACH | Refills: 0 | Status: SHIPPED
Start: 2020-03-12 | End: 2020-05-08 | Stop reason: ALTCHOICE

## 2020-03-12 RX ORDER — ESCITALOPRAM OXALATE 20 MG/1
20 TABLET ORAL DAILY
Qty: 30 TABLET | Refills: 3 | Status: SHIPPED
Start: 2020-03-12 | End: 2020-05-08 | Stop reason: ALTCHOICE

## 2020-03-12 ASSESSMENT — ENCOUNTER SYMPTOMS
RHINORRHEA: 0
COUGH: 0
BACK PAIN: 0
VOMITING: 0
DIARRHEA: 0
ABDOMINAL PAIN: 0
NAUSEA: 0
SHORTNESS OF BREATH: 0

## 2020-03-12 NOTE — PROGRESS NOTES
HPI:  Patient comes in today for   Chief Complaint   Patient presents with    Arm Pain     right elbow ongoing issue has had xrays pt states \"it locks up\"      States right elbow is sticking and locking. He states if he bends it he feels it pop and it has been sore. Reports occasional tingling in the 4th and fifth digits. He is right hand dominant and an , stating he uses a screwdriver a lot. Also reports he used to lift weights. He received XR's of the elbow on 2/28/20. Review of Systems  Review of Systems   Constitutional: Negative for chills and fever. HENT: Negative for congestion and rhinorrhea. Respiratory: Negative for cough and shortness of breath. Cardiovascular: Negative for chest pain and palpitations. Gastrointestinal: Negative for abdominal pain, diarrhea, nausea and vomiting. Genitourinary: Negative for dysuria and hematuria. Musculoskeletal: Negative for arthralgias and back pain. Right elbow pain   Skin: Negative for rash. Neurological: Negative for weakness and headaches. Psychiatric/Behavioral: Negative for dysphoric mood and suicidal ideas. All other systems reviewed and are negative. PE:  VS:  /86   Pulse 88   Temp 98.2 °F (36.8 °C) (Oral)   Resp 16   Ht 6' (1.829 m)   Wt 190 lb (86.2 kg)   SpO2 98%   BMI 25.77 kg/m²        Physical Exam  Constitutional:       Appearance: Normal appearance. HENT:      Head: Normocephalic and atraumatic. Eyes:      Conjunctiva/sclera: Conjunctivae normal.   Cardiovascular:      Rate and Rhythm: Normal rate and regular rhythm. Heart sounds: Normal heart sounds. Pulmonary:      Effort: Pulmonary effort is normal.      Breath sounds: Normal breath sounds. Musculoskeletal:         General: Tenderness present. No swelling or deformity. Comments: Right hand dominant, 45year old . Positive Finkelstein test. Negative Tinel's test. Danny Bollard Quervain's tenosynovitis on the right. Skin:     General: Skin is warm and dry. Neurological:      General: No focal deficit present. Mental Status: He is alert and oriented to person, place, and time. Sensory: No sensory deficit. Deep Tendon Reflexes: Reflexes normal.       Assessment/Plan:  Juan David England was seen today for arm pain. Diagnoses and all orders for this visit:    Right elbow tendonitis  -     predniSONE 5 MG (21) TBPK; Take 6 pills on day 1, 5 pills on day 2,4 pills on day 3, 3 pills on day 4, 2 pills on day 5, 1 pill on day 6. Severe episode of recurrent major depressive disorder, without psychotic features ([facility] [address]  -     escitalopram (LEXAPRO) 20 MG tablet; Take 1 tablet by mouth daily    Anxiety  -     escitalopram (LEXAPRO) 20 MG tablet; Take 1 tablet by mouth daily    Tenosynovitis, de Quervain      Reviewed XR's from 2/28/20    Nakul Templeton D.O.      3/12/20, 7:48 AM EDT. This note is prepared by Samantha Woodard, acting as Scribe for Matilda PARKS. Matilda PARKS  The scribe's documentation has been prepared under my direction and personally reviewed by me in its entirety. I confirm that the note above accurately reflects all work, treatment, procedures, and medical decision making performed by me.

## 2020-04-16 NOTE — PROGRESS NOTES
.You have chosen to receive care through a telephone visit. Do you consent to use a telephone visit for your medical care today? Yes  Those listed are participating in this Telephone visit:  Patient - Douglas Tan  Mother - Latrice Boudreaux  Doctor - Nicolas Salcido  [facility] - Theta Earnest      Subjective     Patient ID: Douglas Tan is a 44 y.o. male. Patient is here for management of multiple medical problems.     Chief Complaint   Patient presents with   • Vomiting     patient states he has been vomiting x 2 weeks, continuing to have abdominal pain., breaking out in cold sweats.   • Pain     patient continuing to have back and neck pain, patient states he went to the pain clinic and was told he would get a pain pump, but due to the virus that has not been scheduled     History of Present Illness       Pain and sick to stomach. Getting worse.  No better.  Pt seen Dr Haider. Pt as to get in for pain pump.  Pt has not contacted him back yet.   No more diarrhea.   Stopped since pt started sucralfate.,  EDG last year [facility].          The following portions of the patient's history were reviewed and updated as appropriate: allergies, current medications, past family history, past medical history, past social history, past surgical history and problem list.    Review of Systems   Constitutional: Positive for fatigue.   Gastrointestinal: Positive for abdominal pain, diarrhea, nausea and vomiting. Negative for constipation.   All other systems reviewed and are negative.      Current Outpatient Medications:   •  buPROPion XL (WELLBUTRIN XL) 150 MG 24 hr tablet, Take 150 mg by mouth Daily., Disp: , Rfl:   •  carvedilol (COREG) 12.5 MG tablet, Take 1 tablet by mouth 2 (Two) Times a Day With Meals., Disp: 60 tablet, Rfl: 11  •  gabapentin (NEURONTIN) 800 MG tablet, Take 800 mg by mouth 3 (Three) Times a Day., Disp: , Rfl: 0  •  Lurasidone HCl (LATUDA) 80 MG tablet, Take 60 mg by mouth Every Night. Patient taking 1/2  tablet at night for a total of 60 mg. , Disp: , Rfl:   •  ondansetron ODT (ZOFRAN-ODT) 4 MG disintegrating tablet, Take 4 mg by mouth., Disp: , Rfl:   •  sucralfate (CARAFATE) 1 g tablet, Take 1 tablet by mouth 4 (Four) Times a Day., Disp: 120 tablet, Rfl: 1    Objective      There were no vitals taken for this visit.    Physical Exam     General Appearance:    Alert, cooperative, no distress, appears stated age   Head:     Eyes:     Ears:     Nose:    Throat:    Neck:    Back:      Lungs:      Chest wall:     Heart:     Abdomen:      Extremities:    Pulses:    Skin:    Lymph nodes:    Neurologic:       Results for orders placed or performed in visit on 01/30/20   Fecal Leukocytes - Stool, Per Rectum   Result Value Ref Range    Fecal Leukocytes Final report None Seen    Result 1 Comment    Gastrointestinal Panel, PCR - Stool, Per Rectum   Result Value Ref Range    Campylobacter Not Detected Not Detected    C.difficile toxin A/B Not Detected Not Detected    Plesiomonas shigelloides Not Detected Not Detected    Salmonella Not Detected Not Detected    Vibrio Not Detected Not Detected    Vibrio cholerae Not Detected Not Detected    Yersinia enterocolitica Not Detected Not Detected    Enteroaggregative E. coli Not Detected Not Detected    Enteropathogenic E. coli Not Detected Not Detected    Enterotoxigenic E. coli Not Detected Not Detected    Shiga-like toxin-producing E. coli  Not Detected Not Detected    E. coli O157 Not applicable Not Detected    Shigella enteroinvasive E. coli Not Detected Not Detected    Cryptosporidium Not Detected Not Detected    Cyclospora cayetanensis Not Detected Not Detected    Entamoeba Histolytica Ag Not Detected Not Detected    Giardia lamblia Not Detected Not Detected    Adenovirus 40/41 Ag Not Detected Not Detected    Astrovirus Not Detected Not Detected    Norovirus GI/GII Not Detected Not Detected    Rotavirus A Not Detected Not Detected    Sapovirus Not Detected Not Detected    Clostridium Difficile EIA - Stool, Per Rectum   Result Value Ref Range    C difficile Toxins A+B, EIA Negative Negative   Comprehensive Metabolic Panel   Result Value Ref Range    Glucose 94 65 - 99 mg/dL    BUN 11 6 - 20 mg/dL    Creatinine 1.04 0.76 - 1.27 mg/dL    eGFR Non African Am 78 >60 mL/min/1.73    eGFR African Am 95 >60 mL/min/1.73    BUN/Creatinine Ratio 10.6 7.0 - 25.0    Sodium 139 136 - 145 mmol/L    Potassium 4.2 3.5 - 5.2 mmol/L    Chloride 100 98 - 107 mmol/L    Total CO2 27.3 22.0 - 29.0 mmol/L    Calcium 9.7 8.6 - 10.5 mg/dL    Total Protein 6.8 6.0 - 8.5 g/dL    Albumin 4.80 3.50 - 5.20 g/dL    Globulin 2.0 gm/dL    A/G Ratio 2.4 g/dL    Total Bilirubin 0.6 0.2 - 1.2 mg/dL    Alkaline Phosphatase 61 39 - 117 U/L    AST (SGOT) 19 1 - 40 U/L    ALT (SGPT) 23 1 - 41 U/L   Vitamin B12   Result Value Ref Range    Vitamin B-12 617 211 - 946 pg/mL   Lipase   Result Value Ref Range    Lipase 19 13 - 60 U/L   H. Pylori Antibody, IgG   Result Value Ref Range    H. pylori IgG 0.47 0.00 - 0.79 Index Value   H. Pylori Antibody, IgA   Result Value Ref Range    H. pylori, IgA ABS <9.0 0.0 - 8.9 units   Gastrin   Result Value Ref Range    Gastrin 43 0 - 115 pg/mL   5 HIAA, Urine, Quantitative, Random - Urine, Clean Catch   Result Value Ref Range    Creatinine, Urine 375.6 Not Estab. mg/dL    5-HIAA, Urine 8.1 Undefined mg/L    5-HIAA, Urine 2.2 0.0 - 6.9 mg/g Creat   IgG   Result Value Ref Range    IgG 695 (L) 700 - 1,600 mg/dL   IgM   Result Value Ref Range    IgM 81 20 - 172 mg/dL   IgA   Result Value Ref Range    IgA 146 90 - 386 mg/dL   HIV-1 / O / 2 Ag / Antibody 4th Generation   Result Value Ref Range    HIV Screen 4th Gen w/RFX (Reference) Non Reactive Non Reactive   Hepatitis Panel, Acute   Result Value Ref Range    Hep A IgM Negative Negative    Hepatitis B Surface Ag Negative Negative    Hep B Core IgM Negative Negative    Hep C Virus Ab 0.2 0.0 - 0.9 s/co ratio   H. Pylori Antigen, Stool - Stool,  Per Rectum   Result Value Ref Range    H pylori Ag, Stl Negative Negative   CBC & Differential   Result Value Ref Range    WBC 6.07 3.40 - 10.80 10*3/mm3    RBC 4.19 4.14 - 5.80 10*6/mm3    Hemoglobin 14.5 13.0 - 17.7 g/dL    Hematocrit 41.3 37.5 - 51.0 %    MCV 98.6 (H) 79.0 - 97.0 fL    MCH 34.6 (H) 26.6 - 33.0 pg    MCHC 35.1 31.5 - 35.7 g/dL    RDW 11.9 (L) 12.3 - 15.4 %    Platelets 283 140 - 450 10*3/mm3    Neutrophil Rel % 57.8 42.7 - 76.0 %    Lymphocyte Rel % 30.1 19.6 - 45.3 %    Monocyte Rel % 6.3 5.0 - 12.0 %    Eosinophil Rel % 4.9 0.3 - 6.2 %    Basophil Rel % 0.7 0.0 - 1.5 %    Neutrophils Absolute 3.51 1.70 - 7.00 10*3/mm3    Lymphocytes Absolute 1.83 0.70 - 3.10 10*3/mm3    Monocytes Absolute 0.38 0.10 - 0.90 10*3/mm3    Eosinophils Absolute 0.30 0.00 - 0.40 10*3/mm3    Basophils Absolute 0.04 0.00 - 0.20 10*3/mm3    Immature Granulocyte Rel % 0.2 0.0 - 0.5 %    Immature Grans Absolute 0.01 0.00 - 0.05 10*3/mm3    nRBC 0.0 0.0 - 0.2 /100 WBC         Assessment/Plan   Pt with uncontrolled n/v daily. Unclear etiology.  Telephone visit today 12 min    Douglas was seen today for vomiting and pain.    Diagnoses and all orders for this visit:    Nausea and vomiting, intractability of vomiting not specified, unspecified vomiting type  -     Tropheryma Whipplei PCR  -     Chromogranin A  -     Ambulatory Referral to Gastroenterology    Diarrhea, unspecified type  -     Tropheryma Whipplei PCR  -     Chromogranin A  -     Ambulatory Referral to Gastroenterology    Other specified health status   -     Chromogranin A      Return in about 3 months (around 7/17/2020).          There are no Patient Instructions on file for this visit.     Nicolas Salcido MD    Assessment/Plan

## 2020-04-17 ENCOUNTER — OFFICE VISIT (OUTPATIENT)
Dept: INTERNAL MEDICINE | Facility: CLINIC | Age: 45
End: 2020-04-17

## 2020-04-17 DIAGNOSIS — R11.2 NAUSEA AND VOMITING, INTRACTABILITY OF VOMITING NOT SPECIFIED, UNSPECIFIED VOMITING TYPE: Primary | ICD-10-CM

## 2020-04-17 DIAGNOSIS — R19.7 DIARRHEA, UNSPECIFIED TYPE: ICD-10-CM

## 2020-04-17 DIAGNOSIS — Z78.9 OTHER SPECIFIED HEALTH STATUS: ICD-10-CM

## 2020-04-17 PROCEDURE — G2025 DIS SITE TELE SVCS RHC/FQHC: HCPCS | Performed by: INTERNAL MEDICINE

## 2020-04-30 ENCOUNTER — HOSPITAL ENCOUNTER (EMERGENCY)
Facility: HOSPITAL | Age: 45
Discharge: LEFT BEFORE DISCHARGE | End: 2020-04-30
Attending: HOSPITALIST
Payer: MEDICARE

## 2020-04-30 VITALS
DIASTOLIC BLOOD PRESSURE: 82 MMHG | SYSTOLIC BLOOD PRESSURE: 125 MMHG | RESPIRATION RATE: 19 BRPM | OXYGEN SATURATION: 90 % | HEART RATE: 120 BPM | TEMPERATURE: 97.9 F

## 2020-04-30 PROCEDURE — 6360000002 HC RX W HCPCS: Performed by: HOSPITALIST

## 2020-04-30 PROCEDURE — 99283 EMERGENCY DEPT VISIT LOW MDM: CPT

## 2020-04-30 PROCEDURE — 93005 ELECTROCARDIOGRAM TRACING: CPT

## 2020-04-30 PROCEDURE — 96374 THER/PROPH/DIAG INJ IV PUSH: CPT

## 2020-04-30 RX ORDER — NALOXONE HYDROCHLORIDE 1 MG/ML
2 INJECTION INTRAMUSCULAR; INTRAVENOUS; SUBCUTANEOUS ONCE
Status: COMPLETED | OUTPATIENT
Start: 2020-04-30 | End: 2020-04-30

## 2020-04-30 RX ADMIN — NALOXONE HYDROCHLORIDE 2 MG: 1 INJECTION PARENTERAL at 15:35

## 2020-04-30 RX ADMIN — NALOXONE HYDROCHLORIDE 2 MG: 1 INJECTION PARENTERAL at 15:29

## 2020-04-30 RX ADMIN — NALOXONE HYDROCHLORIDE 2 MG: 1 INJECTION PARENTERAL at 15:38

## 2020-04-30 RX ADMIN — NALOXONE HYDROCHLORIDE 2 MG: 1 INJECTION PARENTERAL at 15:32

## 2020-04-30 NOTE — ED NOTES
Allegra Allen went over [facility] form with pt and he signed it.       Neil Arellano RN  04/30/20 0697

## 2020-04-30 NOTE — ED PROVIDER NOTES
SURGERY      TRACHEOTOMY           CURRENT MEDICATIONS       Previous Medications    IBUPROFEN (IBU) 800 MG TABLET    Take 1 tablet by mouth every 8 hours as needed for Pain    LURASIDONE (LATUDA) 40 MG TABS TABLET    Take 40 mg by mouth    ONDANSETRON (ZOFRAN ODT) 4 MG DISINTEGRATING TABLET    Take 1 tablet by mouth every 4 hours as needed for Nausea or Vomiting    PANTOPRAZOLE (PROTONIX) 40 MG TABLET    Take 40 mg by mouth daily    SIMETHICONE (MYLICON) 80 MG CHEWABLE TABLET    Take 1 tablet by mouth every 6 hours as needed for Flatulence       ALLERGIES     Buspar [buspirone]; Cymbalta [duloxetine hcl]; Effexor [venlafaxine]; Elavil [amitriptyline]; Shellfish-derived products; Sulfa antibiotics; and Pcn [penicillins]    FAMILY HISTORY     No family history on file.        SOCIAL HISTORY       Social History     Socioeconomic History    Marital status:      Spouse name: Not on file    Number of children: Not on file    Years of education: Not on file    Highest education level: Not on file   Occupational History    Not on file   Social Needs    Financial resource strain: Not on file    Food insecurity     Worry: Not on file     Inability: Not on file    Transportation needs     Medical: Not on file     Non-medical: Not on file   Tobacco Use    Smoking status: Former Smoker     Packs/day: 1.00     Years: 15.00     Pack years: 15.00     Types: Cigarettes    Smokeless tobacco: Never Used   Substance and Sexual Activity    Alcohol use: No     Alcohol/week: 0.0 standard drinks     Comment: patient has stopped drinking beer as of this month    Drug use: No    Sexual activity: Not on file   Lifestyle    Physical activity     Days per week: Not on file     Minutes per session: Not on file    Stress: Not on file   Relationships    Social connections     Talks on phone: Not on file     Gets together: Not on file     Attends [employer] service: Not on file     Active member of club or organization: Not Non-plain film images such as CT, Ultrasound and MRI are read by the radiologist. Plain radiographic images are visualized and preliminarily interpreted by the emergency physician with the below findings:      ? Radiologist's Report Reviewed:  No orders to display         ED BEDSIDE ULTRASOUND:   Performed by ED Physician - none    LABS:    I have reviewed and interpreted all of the currently available lab results from this visit (ifapplicable):  No results found for this visit on 20. All other labs were within normal range or not returned as of this dictation. EMERGENCY DEPARTMENT COURSE and DIFFERENTIAL DIAGNOSIS/MDM:   Vitals:    Vitals:    20 1545   BP: 125/82   Pulse: 120   Resp: 19   Temp: 97.9 °F (36.6 °C)   TempSrc: Oral       MEDICATIONS ADMINISTERED IN ED:  Medications - No data to display    After initial evaluation and examination I did have a conversation with the patient about the upcoming plan, treatment and possible disposition of which he was not agreeable to the times dictation. Examination was not performed till after the patient was administered the Narcan and he spontaneously awoke. Patient was alert awake and oriented x4 he was answer questions appropriately. Patient states he was very upset that he was here at the hospital.  He was a upset to his friend who brought him here. Patient was advised that he was actually purple and not breathing on his own and most likely would have  if his friend had not brought him here. Patient was very reject full of that notion. He states he just drank too much. Patient was advised that we only gave him Narcan which he woke up from which would not affect any type of alcohol that he could have ingested. Narcan only affects opioid type medication/drugs such as heroin. Patient not admit that he had used any heroin other than the alcohol.   Patient wanted everything taken off of him again he was upset for being here at the hospital

## 2020-04-30 NOTE — ED NOTES
Pt brought up to ambulance bay by a friend. He was in the front passenger seat and unresponsive with agonal breathing. Nasal narcan given x2. Pt still unresponsive. Pt pulled from vehicle onto stretcher and put in Trauma 2.       José Luis Bonner RN  04/30/20 1600

## 2020-04-30 NOTE — ED NOTES
2mg nasal narcan given by Harvey Clarke RN. No response by pt.       Nazia Fitzgerald, RN  04/30/20 9228 [facility], RN  04/30/20 5532

## 2020-04-30 NOTE — ED NOTES
Pt starting to arouse more at this time. States his name and birthday correctly, but location wrong.       Eliu Cook RN  04/30/20 2804

## 2020-05-08 ENCOUNTER — OFFICE VISIT (OUTPATIENT)
Dept: FAMILY MEDICINE CLINIC | Age: 45
End: 2020-05-08
Payer: COMMERCIAL

## 2020-05-08 VITALS
HEART RATE: 69 BPM | OXYGEN SATURATION: 99 % | DIASTOLIC BLOOD PRESSURE: 84 MMHG | HEIGHT: 72 IN | RESPIRATION RATE: 14 BRPM | WEIGHT: 197.3 LBS | BODY MASS INDEX: 26.72 KG/M2 | TEMPERATURE: 98.5 F | SYSTOLIC BLOOD PRESSURE: 122 MMHG

## 2020-05-08 PROBLEM — F41.9 ANXIETY: Status: ACTIVE | Noted: 2020-05-08

## 2020-05-08 PROBLEM — F43.21 GRIEF AT LOSS OF CHILD: Status: ACTIVE | Noted: 2020-05-08

## 2020-05-08 PROBLEM — Z63.4 GRIEF AT LOSS OF CHILD: Status: ACTIVE | Noted: 2020-05-08

## 2020-05-08 PROBLEM — J30.1 SEASONAL ALLERGIC RHINITIS DUE TO POLLEN: Status: ACTIVE | Noted: 2020-05-08

## 2020-05-08 PROBLEM — M65.4 TENOSYNOVITIS, DE QUERVAIN: Status: ACTIVE | Noted: 2020-05-08

## 2020-05-08 PROBLEM — F33.2 SEVERE EPISODE OF RECURRENT MAJOR DEPRESSIVE DISORDER, WITHOUT PSYCHOTIC FEATURES (HCC): Status: ACTIVE | Noted: 2020-05-08

## 2020-05-08 PROCEDURE — 99214 OFFICE O/P EST MOD 30 MIN: CPT | Performed by: NURSE PRACTITIONER

## 2020-05-08 RX ORDER — VILAZODONE HYDROCHLORIDE 10 MG/1
10 TABLET ORAL DAILY
Qty: 14 TABLET | Refills: 0 | Status: SHIPPED
Start: 2020-05-08 | End: 2020-06-12 | Stop reason: SINTOL

## 2020-05-08 RX ORDER — VILAZODONE HYDROCHLORIDE 20 MG/1
20 TABLET ORAL DAILY
Qty: 30 TABLET | Refills: 2 | Status: SHIPPED
Start: 2020-05-08 | End: 2020-06-12 | Stop reason: SINTOL

## 2020-05-08 ASSESSMENT — ENCOUNTER SYMPTOMS
NAUSEA: 0
SHORTNESS OF BREATH: 0
WHEEZING: 0
COUGH: 0
DIARRHEA: 0
CONSTIPATION: 0
VOMITING: 0

## 2020-05-08 NOTE — PROGRESS NOTES
Cardiovascular: Negative for chest pain and palpitations. Gastrointestinal: Negative for constipation, diarrhea, nausea and vomiting. Genitourinary: Negative for difficulty urinating. Decreased libido   Musculoskeletal: Positive for joint swelling (right). Neurological: Positive for dizziness (menier's) and headaches. Negative for weakness. VS:  /84   Pulse 69   Temp 98.5 °F (36.9 °C) (Oral)   Resp 14   Ht 6' (1.829 m)   Wt 197 lb 4.8 oz (89.5 kg)   SpO2 99%   BMI 26.76 kg/m²     Physical Exam  Physical Exam  Constitutional:       Appearance: He is well-developed. HENT:      Head: Normocephalic and atraumatic. Right Ear: Tympanic membrane normal.      Ears:      Comments: Left TM with small amount of erythema noted  Neck:      Thyroid: No thyromegaly. Trachea: No tracheal deviation. Cardiovascular:      Rate and Rhythm: Normal rate and regular rhythm. Heart sounds: No murmur. Pulmonary:      Effort: Pulmonary effort is normal.      Breath sounds: Normal breath sounds. Abdominal:      General: Bowel sounds are normal.      Palpations: Abdomen is soft. Tenderness: There is no abdominal tenderness. Musculoskeletal: Normal range of motion. General: Tenderness (to right elbow) present. Lymphadenopathy:      Cervical: No cervical adenopathy. Skin:     General: Skin is warm and dry. Neurological:      Mental Status: He is alert and oriented to person, place, and time. Psychiatric:         Behavior: Behavior normal.           Assessment/Plan:  Supriya Guerra was seen today for elbow injury and depression.     Diagnoses and all orders for this visit:    Severe episode of recurrent major depressive disorder, without psychotic features (HCC)  -     NEW MED - vilazodone HCl (VILAZODONE HCL) 10 MG TABS; Take 1 tablet by mouth daily  -     vilazodone HCl (VILAZODONE HCL) 20 MG TABS; Take 1 tablet by mouth daily  - STOP Lexapro  - Start Viibryd 10 mg daily

## 2020-06-12 ENCOUNTER — OFFICE VISIT (OUTPATIENT)
Dept: FAMILY MEDICINE CLINIC | Age: 45
End: 2020-06-12
Payer: COMMERCIAL

## 2020-06-12 VITALS
WEIGHT: 188 LBS | BODY MASS INDEX: 25.47 KG/M2 | HEIGHT: 72 IN | DIASTOLIC BLOOD PRESSURE: 78 MMHG | SYSTOLIC BLOOD PRESSURE: 130 MMHG | TEMPERATURE: 98.3 F | HEART RATE: 76 BPM | OXYGEN SATURATION: 98 %

## 2020-06-12 PROCEDURE — 99213 OFFICE O/P EST LOW 20 MIN: CPT | Performed by: NURSE PRACTITIONER

## 2020-06-12 RX ORDER — ESCITALOPRAM OXALATE 20 MG/1
20 TABLET ORAL DAILY
Qty: 30 TABLET | Refills: 3 | Status: SHIPPED
Start: 2020-06-12 | End: 2020-08-25 | Stop reason: SDUPTHER

## 2020-06-12 ASSESSMENT — ENCOUNTER SYMPTOMS
DIARRHEA: 0
COUGH: 0
SHORTNESS OF BREATH: 0
CONSTIPATION: 0
VOMITING: 0
WHEEZING: 0
NAUSEA: 0

## 2020-06-12 NOTE — PROGRESS NOTES
HPI:  Patient comes in today for   Chief Complaint   Patient presents with    Depression     new meds not working   . Had been on Lexapro, paxil, zoloft, prozac. Lexapro worked well but decreased libido. Would like to go back to Lexapro at this point. Prior to Visit Medications    Medication Sig Taking? Authorizing Provider   escitalopram (LEXAPRO) 20 MG tablet Take 1 tablet by mouth daily Yes Freya Stark APRN - CNP   busPIRone (BUSPAR) 10 MG tablet Take 1 tablet by mouth 3 times daily Yes Freya Stark APRN - CNP   SUMAtriptan (IMITREX) 100 MG tablet  Yes Historical Provider, MD   diazepam (VALIUM) 2 MG tablet Take 2 mg by mouth every 6 hours as needed for Anxiety. . Yes Historical Provider, MD   meclizine (ANTIVERT) 25 MG tablet Take 1 tablet by mouth 3 times daily as needed for Dizziness Yes Jerry Hays, DO         Allergies   Allergen Reactions    Erythromycin Nausea And Vomiting         Review of Systems  Review of Systems   Constitutional: Negative for appetite change, chills and diaphoresis. Respiratory: Negative for cough, shortness of breath and wheezing. Cardiovascular: Negative for chest pain and palpitations. Gastrointestinal: Negative for constipation, diarrhea, nausea and vomiting. Genitourinary: Negative for difficulty urinating. Neurological: Negative for dizziness, weakness and headaches. Psychiatric/Behavioral: Positive for agitation. He has been very angry feeling since changing medication         VS:  /78   Pulse 76   Temp 98.3 °F (36.8 °C) (Oral)   Ht 6' (1.829 m)   Wt 188 lb (85.3 kg)   SpO2 98%   BMI 25.50 kg/m²     Physical Exam  Physical Exam  Constitutional:       Appearance: Normal appearance. He is well-developed. HENT:      Head: Normocephalic and atraumatic. Pulmonary:      Effort: Pulmonary effort is normal. No respiratory distress. Skin:     General: Skin is dry.    Neurological:      Mental Status: He is alert and oriented

## 2020-07-02 ENCOUNTER — TELEMEDICINE (OUTPATIENT)
Dept: FAMILY MEDICINE CLINIC | Age: 45
End: 2020-07-02
Payer: COMMERCIAL

## 2020-07-02 PROCEDURE — 99213 OFFICE O/P EST LOW 20 MIN: CPT | Performed by: NURSE PRACTITIONER

## 2020-07-02 ASSESSMENT — ENCOUNTER SYMPTOMS
DIARRHEA: 0
NAUSEA: 0
COUGH: 0
WHEEZING: 0
CONSTIPATION: 0
VOMITING: 0
SHORTNESS OF BREATH: 0

## 2020-07-02 NOTE — LETTER
[facility]  [address]  Phone: [phone]  Fax: [address], APRN - CNP        July 2, 2020     Patient: Nanci Soto   YOB: 1975   Date of Visit: 7/2/2020       To Whom It May Concern: It is my medical opinion that Leatha Members may not work past 40 hours weekly regarding his diagnosis of Meniere's disease. These restrictions are for the next calendar year. If you have any questions or concerns, please don't hesitate to call.     Sincerely,        Yao Novak, ADAN - CNP

## 2020-07-02 NOTE — PROGRESS NOTES
TeleMedicine Video Visit    This visit was performed as a virtual video visit using a synchronous, two-way, audio-video telehealth technology platform. Patient identification was verified at the start of the visit, including the patient's telephone number and physical location. I discussed with the patient the nature of our telehealth visits, that:     1. Due to the nature of an audio- video modality, the only components of a physical exam that could be done are the elements supported by direct observation. 2. I would evaluate the patient and recommend diagnostics and treatments based on my assessment. 3. If it was felt that the patient should be evaluated in clinic or an emergency room setting, then they would be directed there. 4. Our sessions are not being recorded and that personal health information is protected. 5. Our team would provide follow up care in person if/when the patient needs it. Patient does agree to proceed with telemedicine consultation. Patient's location: other address in [place] work  Physician  location [facility] other people involved in call, none. Time spent: Greater than 15    This visit was completed virtually using My Chart/Haiku/Barrie        HPI:  Patient comes in today for   Chief Complaint   Patient presents with    Depression     Pt needs to discuss work restrictions   . Needs letter for work restriction to 40 hours weekly for one year. Has not been able to f/u with specialist due to COVID-19. Transitioned well back to Lexapro. No complaints at this time. Prior to Visit Medications    Medication Sig Taking?  Authorizing Provider   escitalopram (LEXAPRO) 20 MG tablet Take 1 tablet by mouth daily Yes Freya Stark APRN - CNP   busPIRone (BUSPAR) 10 MG tablet Take 1 tablet by mouth 3 times daily Yes Freya Stark APRN - CNP   SUMAtriptan (IMITREX) 100 MG tablet  Yes Historical Provider, MD   diazepam (VALIUM) 2 MG tablet Take 2 mg by mouth every 6 hours as needed for Anxiety. . Yes Historical Provider, MD   meclizine (ANTIVERT) 25 MG tablet Take 1 tablet by mouth 3 times daily as needed for Dizziness Yes Mahogany Moran, DO         Allergies   Allergen Reactions    Erythromycin Nausea And Vomiting         Review of Systems  Review of Systems   Constitutional: Negative for chills, diaphoresis, fatigue and fever. Respiratory: Negative for cough, shortness of breath and wheezing. Cardiovascular: Negative for chest pain and palpitations. Gastrointestinal: Negative for constipation, diarrhea, nausea and vomiting. Neurological: Negative for dizziness, weakness and headaches. Psychiatric/Behavioral: The patient is nervous/anxious. Depression, better controlled with current medication         VS:  There were no vitals taken for this visit. Physical Exam  Physical Exam  Constitutional:       Appearance: Normal appearance. Pulmonary:      Effort: Pulmonary effort is normal. No respiratory distress. Neurological:      Mental Status: He is alert and oriented to person, place, and time. Psychiatric:         Mood and Affect: Mood normal.         Behavior: Behavior normal.           Assessment/Plan:  Melody Boo was seen today for depression. Diagnoses and all orders for this visit:    Meniere's disease, unspecified laterality  -  Letter for work restricting weekly work hours to 36  -  Patient will come to office to  letter    Severe episode of recurrent major depressive disorder, without psychotic features ([facility] [address]  -  Better controlled with current medication  -  The current medical regimen is effective;  continue present plan and medications. Anxiety  -  The current medical regimen is effective;  continue present plan and medications. Mamie Franks is a 45 y.o. male being evaluated by a Virtual Visit (video visit) encounter to address concerns as mentioned above. A caregiver was present when appropriate.  Due to this being a

## 2020-07-23 ENCOUNTER — TELEMEDICINE (OUTPATIENT)
Dept: FAMILY MEDICINE CLINIC | Age: 45
End: 2020-07-23
Payer: COMMERCIAL

## 2020-07-23 PROCEDURE — 99213 OFFICE O/P EST LOW 20 MIN: CPT | Performed by: NURSE PRACTITIONER

## 2020-07-23 ASSESSMENT — ENCOUNTER SYMPTOMS
COUGH: 0
SHORTNESS OF BREATH: 0
DIARRHEA: 0
WHEEZING: 0
CONSTIPATION: 0
VOMITING: 0
NAUSEA: 0

## 2020-07-23 NOTE — PROGRESS NOTES
TeleMedicine Video Visit    This visit was performed as a virtual video visit using a synchronous, two-way, audio-video telehealth technology platform. Patient identification was verified at the start of the visit, including the patient's telephone number and physical location. I discussed with the patient the nature of our telehealth visits, that:     1. Due to the nature of an audio- video modality, the only components of a physical exam that could be done are the elements supported by direct observation. 2. I would evaluate the patient and recommend diagnostics and treatments based on my assessment. 3. If it was felt that the patient should be evaluated in clinic or an emergency room setting, then they would be directed there. 4. Our sessions are not being recorded and that personal health information is protected. 5. Our team would provide follow up care in person if/when the patient needs it. Patient does agree to proceed with telemedicine consultation. Patient's location: home address in [address] other people involved in call, none. Time spent: Greater than 15    This visit was completed virtually using My Chart/Haiku/Barrie        HPI:  Patient comes in today for   Chief Complaint   Patient presents with    Dizziness     has been off balance x1 week. menieres flareup. has been unable to work   [employer]     dealing with anxiety and panic attacks. .    Patient called to discuss his anxiety level. He states that things at work are very stressful at this time. When he gets anxious his Meniere's will act up. He has been very dizzy for the last week and has not been able to work. He does not need any refills at this time. But he is requesting that we change his FMLA from intermittent to continuous. He is also requesting the name of a counselor to deal with his anxiety. Prior to Visit Medications    Medication Sig Taking?  Authorizing Provider   escitalopram (LEXAPRO) 20 MG tablet Take 1 tablet by mouth daily Yes ADAN Wetzel - CNP   busPIRone (BUSPAR) 10 MG tablet Take 1 tablet by mouth 3 times daily Yes ADAN Wetzel CNP   SUMAtriptan (IMITREX) 100 MG tablet  Yes Historical Provider, MD   diazepam (VALIUM) 2 MG tablet Take 2 mg by mouth every 6 hours as needed for Anxiety. . Yes Historical Provider, MD   meclizine (ANTIVERT) 25 MG tablet Take 1 tablet by mouth 3 times daily as needed for Dizziness Yes Dilia Catalan,          Allergies   Allergen Reactions    Erythromycin Nausea And Vomiting         Review of Systems  Review of Systems   Constitutional: Negative for chills, diaphoresis and fever. Respiratory: Negative for cough, shortness of breath and wheezing. Cardiovascular: Negative for chest pain and palpitations. Gastrointestinal: Negative for constipation, diarrhea, nausea and vomiting. Genitourinary: Negative for difficulty urinating. Musculoskeletal: Positive for myalgias (great toe). Neurological: Positive for dizziness. Negative for weakness and headaches. Balance issues   Psychiatric/Behavioral: The patient is nervous/anxious. VS:  There were no vitals taken for this visit. Physical Exam  Physical Exam  Constitutional:       Appearance: Normal appearance. Pulmonary:      Effort: No respiratory distress. Neurological:      Mental Status: He is alert and oriented to person, place, and time. Psychiatric:         Mood and Affect: Mood normal.         Behavior: Behavior normal.           Assessment/Plan:  Daphne Farrar was seen today for dizziness and anxiety. Diagnoses and all orders for this visit:    Anxiety  -  The current medical regimen is effective;  continue present plan and medications.   -  Provided patient with name of a counselor for his anxiety    Meniere's disease, unspecified laterality  -  Asked patient to send [facility] paperwork so I can change to continuous  -  The current medical regimen is effective;  continue present plan and medications. Pain of great toe, unspecified laterality  -     Uric Acid; Future      Tru Mart is a 45 y.o. male being evaluated by a Virtual Visit (video visit) encounter to address concerns as mentioned above. A caregiver was present when appropriate. Due to this being a TeleHealth encounter (During HDKYD-99 public health emergency), evaluation of the following organ systems was limited: Vitals/Constitutional/EENT/Resp/CV/GI//MS/Neuro/Skin/Heme-Lymph-Imm. Pursuant to the emergency declaration under the [address] [place] [address], [address] [place] [address] authority and the [employer] and [employer] Act, this Virtual Visit was conducted with patient's (and/or legal guardian's) consent, to reduce the patient's risk of exposure to COVID-19 and provide necessary medical care. The patient (and/or legal guardian) has also been advised to contact this office for worsening conditions or problems, and seek emergency medical treatment and/or call 911 if deemed necessary. Patient identification was verified at the start of the visit: Yes    Total time spent for this encounter: 15    Services were provided through a video synchronous discussion virtually to substitute for in-person clinic visit. Patient and provider were located at their individual homes. --ADAN Amaya - CNP on 7/23/2020 at 11:57 AM    An electronic signature was used to authenticate this note.

## 2020-08-06 ENCOUNTER — OFFICE VISIT (OUTPATIENT)
Dept: FAMILY MEDICINE CLINIC | Age: 45
End: 2020-08-06
Payer: COMMERCIAL

## 2020-08-06 VITALS
BODY MASS INDEX: 25.6 KG/M2 | RESPIRATION RATE: 14 BRPM | HEIGHT: 72 IN | SYSTOLIC BLOOD PRESSURE: 122 MMHG | WEIGHT: 189 LBS | OXYGEN SATURATION: 99 % | TEMPERATURE: 98.4 F | HEART RATE: 72 BPM | DIASTOLIC BLOOD PRESSURE: 76 MMHG

## 2020-08-06 PROCEDURE — 99213 OFFICE O/P EST LOW 20 MIN: CPT | Performed by: NURSE PRACTITIONER

## 2020-08-06 ASSESSMENT — ENCOUNTER SYMPTOMS
DIARRHEA: 0
CONSTIPATION: 0
NAUSEA: 0
COUGH: 0
SHORTNESS OF BREATH: 0
WHEEZING: 0
VOMITING: 0

## 2020-08-14 ENCOUNTER — TELEPHONE (OUTPATIENT)
Dept: FAMILY MEDICINE CLINIC | Age: 45
End: 2020-08-14

## 2020-08-14 NOTE — TELEPHONE ENCOUNTER
Called pt, no answer, LVM to return call to the office. Scheduled appointment with PCP within 7-14 days    Follow Up  No future appointments.     Alma Tobin, [facility]

## 2020-08-18 NOTE — TELEPHONE ENCOUNTER
Called and LVM to return call to the office. This was the second attempt at trying to contact the pt about this over due lab. Follow Up  No future appointments.     Lloyd Mendez

## 2020-08-19 NOTE — TELEPHONE ENCOUNTER
Called pt about over due labs, no answer, LVM to return call to the office. This is the third attempt and also third voicemail left for pt with no response. Okay to cancel order? Please advise. Follow Up  No future appointments.     Katy, [address] [facility]

## 2020-08-25 ENCOUNTER — OFFICE VISIT (OUTPATIENT)
Dept: FAMILY MEDICINE CLINIC | Age: 45
End: 2020-08-25
Payer: COMMERCIAL

## 2020-08-25 VITALS
OXYGEN SATURATION: 98 % | WEIGHT: 193.4 LBS | BODY MASS INDEX: 26.19 KG/M2 | SYSTOLIC BLOOD PRESSURE: 102 MMHG | HEIGHT: 72 IN | DIASTOLIC BLOOD PRESSURE: 84 MMHG | HEART RATE: 81 BPM | TEMPERATURE: 97.2 F | RESPIRATION RATE: 20 BRPM

## 2020-08-25 PROCEDURE — 99213 OFFICE O/P EST LOW 20 MIN: CPT | Performed by: NURSE PRACTITIONER

## 2020-08-25 RX ORDER — LURASIDONE HYDROCHLORIDE 40 MG/1
TABLET, FILM COATED ORAL
COMMUNITY
Start: 2020-08-07 | End: 2020-09-10 | Stop reason: ALTCHOICE

## 2020-08-25 RX ORDER — ESCITALOPRAM OXALATE 20 MG/1
20 TABLET ORAL DAILY
Qty: 30 TABLET | Refills: 3 | Status: SHIPPED
Start: 2020-08-25 | End: 2020-10-10 | Stop reason: SDUPTHER

## 2020-08-25 RX ORDER — TRIHEXYPHENIDYL HYDROCHLORIDE 2 MG/1
TABLET ORAL
COMMUNITY
Start: 2020-08-21 | End: 2020-09-10 | Stop reason: ALTCHOICE

## 2020-08-25 ASSESSMENT — ENCOUNTER SYMPTOMS
DIARRHEA: 0
WHEEZING: 0
SHORTNESS OF BREATH: 0
COUGH: 0
VOMITING: 0
NAUSEA: 0
CONSTIPATION: 0

## 2020-08-25 NOTE — PROGRESS NOTES
HPI:  Patient comes in today for   Chief Complaint   Patient presents with    Dizziness     pt states has been very bad - had to miss work because of the intensity    Nausea & Vomiting   . Reports his Meniere's is still active. He has loud ringing to right ear, he experiences room spinning when he turns his head. He also has nausea. Has been using Valium for symptoms. Has about 6 tablets left. No prescription needed today. He is scheduled to see the specialist for injections but could not get in until December. He is on a cancellation list. He needs a letter for work to continue his short term disability. Prior to Visit Medications    Medication Sig Taking? Authorizing Provider   trihexyphenidyl (ARTANE) 2 MG tablet  Yes Historical Provider, MD   LATUDA 40 MG TABS tablet  Yes Historical Provider, MD   escitalopram (LEXAPRO) 20 MG tablet Take 1 tablet by mouth daily Yes ADAN Wetzel - CNP   busPIRone (BUSPAR) 10 MG tablet Take 1 tablet by mouth 3 times daily Yes ADAN Wetzel - CNP   SUMAtriptan (IMITREX) 100 MG tablet  Yes Historical Provider, MD   diazepam (VALIUM) 2 MG tablet Take 2 mg by mouth every 6 hours as needed for Anxiety. . Yes Historical Provider, MD   meclizine (ANTIVERT) 25 MG tablet Take 1 tablet by mouth 3 times daily as needed for Dizziness Yes Berta Robles, DO         Allergies   Allergen Reactions    Erythromycin Nausea And Vomiting         Review of Systems  Review of Systems   Constitutional: Negative for chills, diaphoresis and fever. Respiratory: Negative for cough, shortness of breath and wheezing. Cardiovascular: Negative for chest pain and palpitations. Gastrointestinal: Negative for constipation, diarrhea, nausea and vomiting. Musculoskeletal: Positive for myalgias (bilateral feet). Neurological: Negative for dizziness, weakness and headaches. Psychiatric/Behavioral: Negative for self-injury and suicidal ideas.  The patient is nervous/anxious (improved). Depression is currently being treated by psych         VS:  /84   Pulse 81   Temp 97.2 °F (36.2 °C) (Temporal)   Resp 20   Ht 6' (1.829 m)   Wt 193 lb 6.4 oz (87.7 kg)   SpO2 98%   BMI 26.23 kg/m²     Physical Exam  Physical Exam  Constitutional:       Appearance: He is well-developed. HENT:      Head: Normocephalic and atraumatic. Right Ear: Tympanic membrane and ear canal normal. There is no impacted cerumen. Left Ear: Tympanic membrane and ear canal normal. There is no impacted cerumen. Neck:      Thyroid: No thyromegaly. Trachea: No tracheal deviation. Cardiovascular:      Rate and Rhythm: Normal rate and regular rhythm. Heart sounds: No murmur. Pulmonary:      Effort: Pulmonary effort is normal.      Breath sounds: Normal breath sounds. Abdominal:      General: Bowel sounds are normal.      Palpations: Abdomen is soft. Tenderness: There is no abdominal tenderness. Musculoskeletal: Normal range of motion. Lymphadenopathy:      Cervical: No cervical adenopathy. Skin:     General: Skin is warm and dry. Neurological:      Mental Status: He is alert and oriented to person, place, and time. Psychiatric:         Behavior: Behavior normal.           Assessment/Plan:  Murray Fisher was seen today for dizziness and nausea & vomiting. Diagnoses and all orders for this visit:    Meniere's disease, unspecified laterality  -  Off work until Monday September 7th. Grief at loss of child  -     escitalopram (LEXAPRO) 20 MG tablet; Take 1 tablet by mouth daily  - Reviewed side effects of medication and patient verbalizes understanding.   - The current medical regimen is effective;  continue present plan and medications. -  Continue to follow with psych and counseling     Anxiety  -     escitalopram (LEXAPRO) 20 MG tablet;  Take 1 tablet by mouth daily    Greater than 15 minutes was spent with patient and more than 50% of the time was spent face to face counseling and educating regarding diagnoses

## 2020-08-27 ENCOUNTER — TELEPHONE (OUTPATIENT)
Dept: INTERNAL MEDICINE | Facility: CLINIC | Age: 45
End: 2020-08-27

## 2020-09-10 ENCOUNTER — OFFICE VISIT (OUTPATIENT)
Dept: FAMILY MEDICINE CLINIC | Age: 45
End: 2020-09-10
Payer: COMMERCIAL

## 2020-09-10 VITALS
SYSTOLIC BLOOD PRESSURE: 116 MMHG | HEART RATE: 84 BPM | BODY MASS INDEX: 26.41 KG/M2 | WEIGHT: 195 LBS | DIASTOLIC BLOOD PRESSURE: 78 MMHG | HEIGHT: 72 IN | OXYGEN SATURATION: 99 % | RESPIRATION RATE: 14 BRPM | TEMPERATURE: 97.4 F

## 2020-09-10 PROCEDURE — 99213 OFFICE O/P EST LOW 20 MIN: CPT | Performed by: NURSE PRACTITIONER

## 2020-09-10 RX ORDER — LAMOTRIGINE 25 MG/1
100 TABLET ORAL DAILY
COMMUNITY

## 2020-09-10 ASSESSMENT — ENCOUNTER SYMPTOMS
VOMITING: 0
SHORTNESS OF BREATH: 0
NAUSEA: 0
WHEEZING: 0
CONSTIPATION: 0
COUGH: 0
DIARRHEA: 0

## 2020-09-10 NOTE — PATIENT INSTRUCTIONS
Patient Education        Achilles Tendon: Exercises  Introduction  Here are some examples of exercises for you to try. The exercises may be suggested for a condition or for rehabilitation. Start each exercise slowly. Ease off the exercises if you start to have pain. You will be told when to start these exercises and which ones will work best for you. Toe stretch  Toe stretch   1. Sit in a chair, and extend your affected leg so that your heel is on the floor. 2. With your hand, reach down and pull your big toe up and back. Pull toward your ankle and away from the floor. 3. Hold the position for at least 15 to 30 seconds. 4. Repeat 2 to 4 times a session, several times a day. Calf-plantar fascia stretch   1. Sit with your legs extended and knees straight. 2. Place a towel around your foot just under the toes. 3. Hold each end of the towel in each hand, with your hands above your knees. 4. Pull back with the towel so that your foot stretches toward you. 5. Hold the position for at least 15 to 30 seconds. 6. Repeat 2 to 4 times a session, up to 5 sessions a day. Floor stretch   1. Stand about 2 feet from a wall, and place your hands on the wall at about shoulder height. Or you can stand behind a chair, placing your hands on the back of it for balance. 2. Step back with the leg you want to stretch. Keep the leg straight, and press your heel into the floor with your toe turned slightly in.  3. Lean forward, and bend your other leg slightly. Feel the stretch in the Achilles tendon of your back leg. Hold for at least 15 to 30 seconds. 4. Repeat 2 to 4 times a session, up to 5 sessions a day. Stair stretch   1. Stand with the balls of both feet on the edge of a step or curb (or a medium-sized phone book). With at least one hand, hold onto something solid for balance, such as a banister or handrail.   2. Keeping your affected leg straight, slowly let that heel hang down off of the step or curb until you feel a stretch in the back of your calf and/or Achilles area. Some of your weight should still be on the other leg. 3. Hold this position for at least 15 to 30 seconds. 4. Repeat 2 to 4 times a session, up to 5 times a day or whenever your Achilles tendon starts to feel tight. This stretch can also be done with your knee slightly bent. Strength exercise   1. This exercise will get you started on building strength after an Achilles tendon injury. Your doctor or physical therapist can help you move on to more challenging exercises as you heal and get stronger. 2. Stand on a step with your heel off the edge of the step. Hold on to a handrail or wall for balance. 3. Push up on your toes, then slowly count to 10 as you lower yourself back down until your heel is below the step. If it hurts to push up on your toes, try putting most of your weight on your other foot as you push up, or try using your arms to help you. If you can't do this exercise without causing pain, stop the exercise and talk to your doctor. 4. Repeat the exercise 8 to 12 times, half with the knee straight and half with the knee bent. Follow-up care is a key part of your treatment and safety. Be sure to make and go to all appointments, and call your doctor if you are having problems. It's also a good idea to know your test results and keep a list of the medicines you take. Where can you learn more? Go to https://[email or web address] org and sign in to your [employer] account. Enter Q916 in the [employer] [facility] box to learn more about \"Achilles Tendon: Exercises. \"     If you do not have an account, please click on the \"Sign Up Now\" link. Current as of: March 2, 2020               Content Version: 12.5  © 1980-0892 Healthwise, Incorporated. Care instructions adapted under license by [facility] ([facility]).  If you have questions about a medical condition or this instruction, always ask your healthcare professional. [employer] disclaims any warranty or liability for your use of this information.

## 2020-09-10 NOTE — PROGRESS NOTES
HPI:  Patient comes in today for   Chief Complaint   Patient presents with    Tendonitis     right side. difficulty with burning sensation when he gets up to walk. ankle sore from using it to favor achilles.  Dizziness     still bad. still feels like on a boat. still not working. was supposed to go back tuesday and wasn't able to. specialist can't get him in until december. .    Bety is still acting up. He is very dizzy most times. Not able to go back to work. Has appointment with specialist in Dec. Is on waiting list.     Also having pain in right achilles tendon. He does not remember doing anything to cause it. Having trouble standing on his toe. Prior to Visit Medications    Medication Sig Taking? Authorizing Provider   lamoTRIgine (LAMICTAL) 25 MG tablet Take 25 mg by mouth 2 times daily Yes Historical Provider, MD   escitalopram (LEXAPRO) 20 MG tablet Take 1 tablet by mouth daily Yes Freya Stark APRN - CNP   busPIRone (BUSPAR) 10 MG tablet Take 1 tablet by mouth 3 times daily Yes ADAN Wetzel - CNP   SUMAtriptan (IMITREX) 100 MG tablet  Yes Historical Provider, MD   diazepam (VALIUM) 2 MG tablet Take 2 mg by mouth every 6 hours as needed for Anxiety. . Yes Historical Provider, MD   meclizine (ANTIVERT) 25 MG tablet Take 1 tablet by mouth 3 times daily as needed for Dizziness Yes Ele Davies, DO         Allergies   Allergen Reactions    Erythromycin Nausea And Vomiting         Review of Systems  Review of Systems   Constitutional: Negative for chills, diaphoresis and fever. Respiratory: Negative for cough, shortness of breath and wheezing. Cardiovascular: Negative for chest pain and palpitations. Gastrointestinal: Negative for constipation, diarrhea, nausea and vomiting. Musculoskeletal: Positive for myalgias (right achilles tendon). Neurological: Positive for dizziness, light-headedness and headaches. Negative for weakness.          VS:  /78   Pulse 84 Temp 97.4 °F (36.3 °C) (Temporal)   Resp 14   Ht 6' (1.829 m)   Wt 195 lb (88.5 kg)   SpO2 99%   BMI 26.45 kg/m²     Physical Exam  Physical Exam  Constitutional:       Appearance: He is well-developed. HENT:      Head: Normocephalic and atraumatic. Neck:      Thyroid: No thyromegaly. Trachea: No tracheal deviation. Cardiovascular:      Rate and Rhythm: Normal rate and regular rhythm. Heart sounds: No murmur. Pulmonary:      Effort: Pulmonary effort is normal.      Breath sounds: Normal breath sounds. Abdominal:      General: Bowel sounds are normal.      Palpations: Abdomen is soft. Tenderness: There is no abdominal tenderness. Musculoskeletal: Normal range of motion. General: Tenderness (to right achillles tendon) present. Lymphadenopathy:      Cervical: No cervical adenopathy. Skin:     General: Skin is warm and dry. Neurological:      Mental Status: He is alert and oriented to person, place, and time. Psychiatric:         Behavior: Behavior normal.           Assessment/Plan:  Mayi Laws was seen today for tendonitis and dizziness.     Diagnoses and all orders for this visit:    Meniere's disease, unspecified laterality  -  May have work excuse for 2 weeks  -  May do video visit for next visit    Achilles tendinitis of right lower extremity  -  Ibuprofen   -  Ice  -  Exercises and stretches provided      Greater than 15 minutes was spent with patient and more than 50% of the time was spent face to face counseling and educating regarding diagnoses

## 2020-09-24 ENCOUNTER — TELEMEDICINE (OUTPATIENT)
Dept: FAMILY MEDICINE CLINIC | Age: 45
End: 2020-09-24
Payer: COMMERCIAL

## 2020-09-24 PROCEDURE — 99213 OFFICE O/P EST LOW 20 MIN: CPT | Performed by: NURSE PRACTITIONER

## 2020-09-24 RX ORDER — PREDNISONE 10 MG/1
10 TABLET ORAL SEE ADMIN INSTRUCTIONS
Qty: 21 TABLET | Refills: 0 | Status: SHIPPED
Start: 2020-09-24 | End: 2020-09-30

## 2020-09-24 ASSESSMENT — ENCOUNTER SYMPTOMS
VOMITING: 0
DIARRHEA: 0
CONSTIPATION: 0
WHEEZING: 0
NAUSEA: 0
COUGH: 0
SHORTNESS OF BREATH: 0

## 2020-09-24 NOTE — PROGRESS NOTES
TeleMedicine Video Visit    This visit was performed as a virtual video visit using a synchronous, two-way, audio-video telehealth technology platform. Patient identification was verified at the start of the visit, including the patient's telephone number and physical location. I discussed with the patient the nature of our telehealth visits, that:     1. Due to the nature of an audio- video modality, the only components of a physical exam that could be done are the elements supported by direct observation. 2. I would evaluate the patient and recommend diagnostics and treatments based on my assessment. 3. If it was felt that the patient should be evaluated in clinic or an emergency room setting, then they would be directed there. 4. Our sessions are not being recorded and that personal health information is protected. 5. Our team would provide follow up care in person if/when the patient needs it. Patient does agree to proceed with telemedicine consultation. Patient's location: home address in [address] other people involved in call, none. Time spent: Greater than 15    This visit was completed virtually using My Chart/Haiku/Barrie        HPI:  Patient comes in today for   Chief Complaint   Patient presents with    Dizziness     follow up. no improvement. specialist can't see until december. .    No improvement in his symptoms. He continues to be dizzy which affects his balance. Not able to go back to work and climb ladders. He has tried to get into the specialist earlier than December. Follows with psych for anxiety and depression. Yesterday was a hard day since it was his daughter's birthday. Prior to Visit Medications    Medication Sig Taking?  Authorizing Provider   predniSONE (DELTASONE) 10 MG tablet Take 1 tablet by mouth See Admin Instructions for 6 days Taper 6,5,4,3,2,1 Yes Freya Stark, APRN - CNP   lamoTRIgine (LAMICTAL) 25 MG tablet Take 100 mg by mouth daily  Yes Historical Provider, MD   escitalopram (LEXAPRO) 20 MG tablet Take 1 tablet by mouth daily Yes ADAN Wetzel CNP   busPIRone (BUSPAR) 10 MG tablet Take 1 tablet by mouth 3 times daily Yes ADAN Wetzel CNP   SUMAtriptan (IMITREX) 100 MG tablet  Yes Historical Provider, MD   diazepam (VALIUM) 2 MG tablet Take 2 mg by mouth every 6 hours as needed for Anxiety. . Yes Historical Provider, MD   meclizine (ANTIVERT) 25 MG tablet Take 1 tablet by mouth 3 times daily as needed for Dizziness Yes Sugar Amado, DO         Allergies   Allergen Reactions    Erythromycin Nausea And Vomiting         Review of Systems  Review of Systems   Constitutional: Negative for chills, diaphoresis and fever. Respiratory: Negative for cough, shortness of breath and wheezing. Cardiovascular: Negative for chest pain and palpitations. Gastrointestinal: Negative for constipation, diarrhea, nausea and vomiting. Genitourinary: Negative for difficulty urinating. Neurological: Positive for dizziness, light-headedness and headaches. Negative for weakness. VS:  There were no vitals taken for this visit. Physical Exam  Physical Exam  Constitutional:       Appearance: Normal appearance. He is well-developed. Pulmonary:      Effort: Pulmonary effort is normal. No respiratory distress. Neurological:      Mental Status: He is alert and oriented to person, place, and time. Psychiatric:         Mood and Affect: Mood normal.         Behavior: Behavior normal.             Assessment/Plan:  Tanner Kirkland was seen today for dizziness. Diagnoses and all orders for this visit:    Meniere's disease, unspecified laterality  -     predniSONE (DELTASONE) 10 MG tablet;  Take 1 tablet by mouth See Admin Instructions for 6 days Taper 6,5,4,3,2,1  - Reviewed side effects of medication and patient verbalizes understanding.   - Advised to call back directly if there are further questions, or if these symptoms fail to improve as anticipated or worsen. - May be off 2 more weeks for work, patient will contact job for proper paperwork  - Contact office if symptoms resolve sooner  - F/u in 2 weeks, may do another video visit     Grief at loss of child  -  The current medical regimen is effective;  continue present plan and medications. -  Continue to f/u with psych and counseling    Severe episode of recurrent major depressive disorder, without psychotic features ([facility] [address]  -  The current medical regimen is effective;  continue present plan and medications. Anxiety  -  The current medical regimen is effective;  continue present plan and medications. Vertigo  -     predniSONE (DELTASONE) 10 MG tablet; Take 1 tablet by mouth See Admin Instructions for 6 days Taper 6,5,4,3,2,1        -  Send steroids  -  2 weeks    Brian Atwood is a 45 y.o. male being evaluated by a Virtual Visit (video visit) encounter to address concerns as mentioned above. A caregiver was present when appropriate. Due to this being a TeleHealth encounter ([facility] emergency), evaluation of the following organ systems was limited: Vitals/Constitutional/EENT/Resp/CV/GI//MS/Neuro/Skin/Heme-Lymph-Imm. Pursuant to the emergency declaration under the [address] [place] [address], [address] [place] [address] authority and the [employer] Act, this Virtual Visit was conducted with patient's (and/or legal guardian's) consent, to reduce the patient's risk of exposure to COVID-19 and provide necessary medical care. The patient (and/or legal guardian) has also been advised to contact this office for worsening conditions or problems, and seek emergency medical treatment and/or call 911 if deemed necessary.      Patient identification was verified at the start of the visit: Yes    Total time spent for this encounter: 15    Services were provided through a video synchronous discussion virtually to substitute for in-person clinic visit. Patient and provider were located at their individual homes. --ADAN Hemphill CNP on 9/24/2020 at 12:57 PM    An electronic signature was used to authenticate this note.

## 2020-09-30 ENCOUNTER — APPOINTMENT (OUTPATIENT)
Dept: GENERAL RADIOLOGY | Age: 45
End: 2020-09-30
Payer: COMMERCIAL

## 2020-09-30 ENCOUNTER — HOSPITAL ENCOUNTER (EMERGENCY)
Age: 45
Discharge: LEFT BEFORE DISCHARGE | End: 2020-09-30
Payer: COMMERCIAL

## 2020-09-30 VITALS
RESPIRATION RATE: 16 BRPM | SYSTOLIC BLOOD PRESSURE: 121 MMHG | OXYGEN SATURATION: 97 % | DIASTOLIC BLOOD PRESSURE: 73 MMHG | HEIGHT: 72 IN | WEIGHT: 200 LBS | HEART RATE: 82 BPM | BODY MASS INDEX: 27.09 KG/M2 | TEMPERATURE: 98.4 F

## 2020-09-30 ASSESSMENT — PAIN DESCRIPTION - ORIENTATION: ORIENTATION: LEFT

## 2020-09-30 ASSESSMENT — PAIN DESCRIPTION - DESCRIPTORS: DESCRIPTORS: SHARP;BURNING

## 2020-09-30 ASSESSMENT — PAIN DESCRIPTION - ONSET: ONSET: SUDDEN

## 2020-09-30 ASSESSMENT — PAIN SCALES - GENERAL: PAINLEVEL_OUTOF10: 6

## 2020-09-30 ASSESSMENT — PAIN DESCRIPTION - PROGRESSION: CLINICAL_PROGRESSION: GRADUALLY WORSENING

## 2020-09-30 ASSESSMENT — PAIN DESCRIPTION - LOCATION: LOCATION: RIB CAGE

## 2020-09-30 ASSESSMENT — PAIN DESCRIPTION - FREQUENCY: FREQUENCY: CONTINUOUS

## 2020-09-30 ASSESSMENT — PAIN DESCRIPTION - PAIN TYPE: TYPE: ACUTE PAIN

## 2020-10-02 ENCOUNTER — OFFICE VISIT (OUTPATIENT)
Dept: FAMILY MEDICINE CLINIC | Age: 45
End: 2020-10-02
Payer: COMMERCIAL

## 2020-10-02 VITALS
SYSTOLIC BLOOD PRESSURE: 122 MMHG | HEART RATE: 69 BPM | DIASTOLIC BLOOD PRESSURE: 84 MMHG | WEIGHT: 201 LBS | TEMPERATURE: 97.6 F | OXYGEN SATURATION: 98 % | HEIGHT: 72 IN | BODY MASS INDEX: 27.22 KG/M2 | RESPIRATION RATE: 16 BRPM

## 2020-10-02 PROCEDURE — 99213 OFFICE O/P EST LOW 20 MIN: CPT | Performed by: NURSE PRACTITIONER

## 2020-10-02 RX ORDER — IBUPROFEN 800 MG/1
800 TABLET ORAL EVERY 8 HOURS PRN
Qty: 90 TABLET | Refills: 0 | Status: SHIPPED | OUTPATIENT
Start: 2020-10-02

## 2020-10-02 ASSESSMENT — ENCOUNTER SYMPTOMS
NAUSEA: 0
BACK PAIN: 1
DIARRHEA: 0
SHORTNESS OF BREATH: 1
COUGH: 0
VOMITING: 0
CONSTIPATION: 0
CHEST TIGHTNESS: 0
WHEEZING: 0

## 2020-10-09 ENCOUNTER — TELEMEDICINE (OUTPATIENT)
Dept: FAMILY MEDICINE CLINIC | Age: 45
End: 2020-10-09
Payer: COMMERCIAL

## 2020-10-09 PROCEDURE — 99213 OFFICE O/P EST LOW 20 MIN: CPT | Performed by: NURSE PRACTITIONER

## 2020-10-09 ASSESSMENT — ENCOUNTER SYMPTOMS
VOMITING: 0
BACK PAIN: 1
WHEEZING: 0
CONSTIPATION: 0
DIARRHEA: 0
NAUSEA: 0
SHORTNESS OF BREATH: 0
COUGH: 1

## 2020-10-09 NOTE — PROGRESS NOTES
TeleMedicine Video Visit    This visit was performed as a virtual video visit using a synchronous, two-way, audio-video telehealth technology platform. Patient identification was verified at the start of the visit, including the patient's telephone number and physical location. I discussed with the patient the nature of our telehealth visits, that:     1. Due to the nature of an audio- video modality, the only components of a physical exam that could be done are the elements supported by direct observation. 2. I would evaluate the patient and recommend diagnostics and treatments based on my assessment. 3. If it was felt that the patient should be evaluated in clinic or an emergency room setting, then they would be directed there. 4. Our sessions are not being recorded and that personal health information is protected. 5. Our team would provide follow up care in person if/when the patient needs it. Patient does agree to proceed with telemedicine consultation. Patient's location: home address in [address] other people involved in call, none. Time spent: Greater than 15    This visit was completed virtually using My Chart/Haiku/Barrie        HPI:  Patient comes in today for   Chief Complaint   Patient presents with    Dizziness     has been off work - still having issues    . Has been calling specialist at [facility] for earlier appoientment than Dec. Still continues to be dizzy and feeling like he is on a boat. States that it just started to improve with the medrol dose pack. he states that he does not have another job he can at work. Prior to Visit Medications    Medication Sig Taking?  Authorizing Provider   escitalopram (LEXAPRO) 20 MG tablet Take 1 tablet by mouth daily Yes Freya Stark, APRN - CNP   predniSONE (DELTASONE) 20 MG tablet Take one tablet for 7 days then half tab for 14 days Yes Freya Stark, APRN - CNP   ibuprofen (ADVIL;MOTRIN) 800 MG tablet Take 1 tablet by mouth every 8 hours as needed for Pain Yes Lakeshia Thornton, APRN - CNP   lamoTRIgine (LAMICTAL) 25 MG tablet Take 100 mg by mouth daily  Yes Historical Provider, MD   busPIRone (BUSPAR) 10 MG tablet Take 1 tablet by mouth 3 times daily Yes Freya Stark, APRN - CNP   SUMAtriptan (IMITREX) 100 MG tablet  Yes Historical Provider, MD   diazepam (VALIUM) 2 MG tablet Take 2 mg by mouth every 6 hours as needed for Anxiety. . Yes Historical Provider, MD   meclizine (ANTIVERT) 25 MG tablet Take 1 tablet by mouth 3 times daily as needed for Dizziness Yes Nohelia Hernandes, DO         Allergies   Allergen Reactions    Erythromycin Nausea And Vomiting         Review of Systems  Review of Systems   Constitutional: Negative for chills, diaphoresis and fever. Respiratory: Positive for cough. Negative for shortness of breath and wheezing. Cardiovascular: Negative for chest pain and palpitations. Gastrointestinal: Negative for constipation, diarrhea, nausea and vomiting. Genitourinary: Negative for difficulty urinating. Musculoskeletal: Positive for back pain and myalgias. Neurological: Positive for dizziness. Negative for weakness and headaches. VS:  There were no vitals taken for this visit. Physical Exam  Physical Exam  Constitutional:       Appearance: Normal appearance. Pulmonary:      Effort: Pulmonary effort is normal. No respiratory distress. Neurological:      Mental Status: He is alert and oriented to person, place, and time. Psychiatric:         Mood and Affect: Mood normal.         Behavior: Behavior normal.           Assessment/Plan:  Manpreet Lira was seen today for dizziness. Discussed case with Dr. Curiel Standard    Diagnoses and all orders for this visit:    Meniere's disease, unspecified laterality  -     predniSONE (DELTASONE) 20 MG tablet; Take one tablet for 7 days then half tab for 14 days  - Reviewed side effects of medication and patient verbalizes understanding. - Advised to call back directly if there are further questions, or if these symptoms fail to improve as anticipated or worsen. - F/u in 2 weeks  - Off week for next 2 weeks    Grief at loss of child  -     escitalopram (LEXAPRO) 20 MG tablet; Take 1 tablet by mouth daily  - The current medical regimen is effective;  continue present plan and medications. Anxiety  -     escitalopram (LEXAPRO) 20 MG tablet; Take 1 tablet by mouth daily        Sonia Ceja is a 45 y.o. male being evaluated by a Virtual Visit (video visit) encounter to address concerns as mentioned above. A caregiver was present when appropriate. Due to this being a TeleHealth encounter ([facility]), evaluation of the following organ systems was limited: Vitals/Constitutional/EENT/Resp/CV/GI//MS/Neuro/Skin/Heme-Lymph-Imm. Pursuant to the emergency declaration under the [address] [place] [address], [address] [place] [address] authority and the [employer] Act, this Virtual Visit was conducted with patient's (and/or legal guardian's) consent, to reduce the patient's risk of exposure to COVID-19 and provide necessary medical care. The patient (and/or legal guardian) has also been advised to contact this office for worsening conditions or problems, and seek emergency medical treatment and/or call 911 if deemed necessary. Patient identification was verified at the start of the visit: Yes    Total time spent for this encounter: 15    Services were provided through a video synchronous discussion virtually to substitute for in-person clinic visit. Patient and provider were located at their individual homes. --ADAN Laws CNP on 10/13/2020 at 11:12 PM    An electronic signature was used to authenticate this note.

## 2020-10-10 RX ORDER — ESCITALOPRAM OXALATE 20 MG/1
20 TABLET ORAL DAILY
Qty: 30 TABLET | Refills: 3 | Status: SHIPPED | OUTPATIENT
Start: 2020-10-10

## 2020-10-10 RX ORDER — PREDNISONE 20 MG/1
TABLET ORAL
Qty: 14 TABLET | Refills: 0 | Status: SHIPPED
Start: 2020-10-10 | End: 2021-02-12 | Stop reason: ALTCHOICE

## 2020-10-22 ENCOUNTER — TELEPHONE (OUTPATIENT)
Dept: FAMILY MEDICINE CLINIC | Age: 45
End: 2020-10-22

## 2020-10-22 NOTE — TELEPHONE ENCOUNTER
Pt is feeling better would like to return to work on Monday needs a return to work letter. Can I get one ready for him for tomorrow?

## 2020-11-12 ENCOUNTER — APPOINTMENT (OUTPATIENT)
Dept: CT IMAGING | Facility: HOSPITAL | Age: 45
End: 2020-11-12
Payer: MEDICARE

## 2020-11-12 ENCOUNTER — APPOINTMENT (OUTPATIENT)
Dept: GENERAL RADIOLOGY | Facility: HOSPITAL | Age: 45
End: 2020-11-12
Payer: MEDICARE

## 2020-11-12 ENCOUNTER — HOSPITAL ENCOUNTER (EMERGENCY)
Facility: HOSPITAL | Age: 45
Discharge: HOME | End: 2020-11-12
Attending: HOSPITALIST
Payer: MEDICARE

## 2020-11-12 VITALS
HEIGHT: 70 IN | TEMPERATURE: 98.8 F | HEART RATE: 110 BPM | WEIGHT: 160 LBS | OXYGEN SATURATION: 98 % | DIASTOLIC BLOOD PRESSURE: 90 MMHG | RESPIRATION RATE: 16 BRPM | BODY MASS INDEX: 22.9 KG/M2 | SYSTOLIC BLOOD PRESSURE: 130 MMHG

## 2020-11-12 LAB
A/G RATIO: 1.9 (ref 0.8–2)
ALBUMIN SERPL-MCNC: 4.9 G/DL (ref 3.4–4.8)
ALP BLD-CCNC: 127 U/L (ref 25–100)
ALT SERPL-CCNC: 48 U/L (ref 4–36)
ANION GAP SERPL CALCULATED.3IONS-SCNC: 12 MMOL/L (ref 3–16)
AST SERPL-CCNC: 40 U/L (ref 8–33)
BASOPHILS ABSOLUTE: 0.1 K/UL (ref 0–0.1)
BASOPHILS RELATIVE PERCENT: 0.7 %
BILIRUB SERPL-MCNC: 0.7 MG/DL (ref 0.3–1.2)
BUN BLDV-MCNC: 11 MG/DL (ref 6–20)
CALCIUM SERPL-MCNC: 9.3 MG/DL (ref 8.5–10.5)
CHLORIDE BLD-SCNC: 98 MMOL/L (ref 98–107)
CO2: 26 MMOL/L (ref 20–30)
CREAT SERPL-MCNC: 1.1 MG/DL (ref 0.4–1.2)
EOSINOPHILS ABSOLUTE: 0.1 K/UL (ref 0–0.4)
EOSINOPHILS RELATIVE PERCENT: 0.5 %
GFR AFRICAN AMERICAN: >59
GFR NON-AFRICAN AMERICAN: >59
GLOBULIN: 2.6 G/DL
GLUCOSE BLD-MCNC: 98 MG/DL (ref 74–106)
HCT VFR BLD CALC: 43.8 % (ref 40–54)
HEMOGLOBIN: 14.3 G/DL (ref 13–18)
IMMATURE GRANULOCYTES #: 0 K/UL
IMMATURE GRANULOCYTES %: 0.3 % (ref 0–5)
LYMPHOCYTES ABSOLUTE: 2.3 K/UL (ref 1.5–4)
LYMPHOCYTES RELATIVE PERCENT: 18.7 %
MCH RBC QN AUTO: 32.7 PG (ref 27–32)
MCHC RBC AUTO-ENTMCNC: 32.6 G/DL (ref 31–35)
MCV RBC AUTO: 100.2 FL (ref 80–100)
MONOCYTES ABSOLUTE: 0.7 K/UL (ref 0.2–0.8)
MONOCYTES RELATIVE PERCENT: 5.9 %
NEUTROPHILS ABSOLUTE: 9 K/UL (ref 2–7.5)
NEUTROPHILS RELATIVE PERCENT: 73.9 %
PDW BLD-RTO: 13.5 % (ref 11–16)
PLATELET # BLD: 315 K/UL (ref 150–400)
PMV BLD AUTO: 9 FL (ref 6–10)
POTASSIUM REFLEX MAGNESIUM: 3.6 MMOL/L (ref 3.4–5.1)
RBC # BLD: 4.37 M/UL (ref 4.5–6)
SODIUM BLD-SCNC: 136 MMOL/L (ref 136–145)
TOTAL PROTEIN: 7.5 G/DL (ref 6.4–8.3)
TROPONIN: <0.3 NG/ML
WBC # BLD: 12.2 K/UL (ref 4–11)

## 2020-11-12 PROCEDURE — 70450 CT HEAD/BRAIN W/O DYE: CPT

## 2020-11-12 PROCEDURE — 72125 CT NECK SPINE W/O DYE: CPT

## 2020-11-12 PROCEDURE — 72128 CT CHEST SPINE W/O DYE: CPT

## 2020-11-12 PROCEDURE — 2580000003 HC RX 258: Performed by: HOSPITALIST

## 2020-11-12 PROCEDURE — 80053 COMPREHEN METABOLIC PANEL: CPT

## 2020-11-12 PROCEDURE — 84484 ASSAY OF TROPONIN QUANT: CPT

## 2020-11-12 PROCEDURE — 85025 COMPLETE CBC W/AUTO DIFF WBC: CPT

## 2020-11-12 PROCEDURE — 36415 COLL VENOUS BLD VENIPUNCTURE: CPT

## 2020-11-12 PROCEDURE — 71045 X-RAY EXAM CHEST 1 VIEW: CPT

## 2020-11-12 PROCEDURE — 93005 ELECTROCARDIOGRAM TRACING: CPT

## 2020-11-12 PROCEDURE — 99284 EMERGENCY DEPT VISIT MOD MDM: CPT

## 2020-11-12 RX ORDER — GABAPENTIN 400 MG/1
800 CAPSULE ORAL 3 TIMES DAILY
COMMUNITY

## 2020-11-12 RX ORDER — 0.9 % SODIUM CHLORIDE 0.9 %
1000 INTRAVENOUS SOLUTION INTRAVENOUS ONCE
Status: COMPLETED | OUTPATIENT
Start: 2020-11-12 | End: 2020-11-12

## 2020-11-12 RX ADMIN — SODIUM CHLORIDE 1000 ML: 9 INJECTION, SOLUTION INTRAVENOUS at 18:16

## 2020-11-12 ASSESSMENT — PAIN DESCRIPTION - FREQUENCY: FREQUENCY: CONTINUOUS

## 2020-11-12 ASSESSMENT — PAIN SCALES - GENERAL: PAINLEVEL_OUTOF10: 8

## 2020-11-12 ASSESSMENT — PAIN DESCRIPTION - PAIN TYPE: TYPE: ACUTE PAIN
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
## 2020-11-12 NOTE — ED NOTES
Dc instructions given to patient at this time. Patient verbalized understanding and no further questions or concerns. Patient encouraged to follow up with pcp or return to ER for worsening symptoms.      Chris Garcia RN  11/12/20 [id number]

## 2020-11-13 LAB
EKG ATRIAL RATE: 109 BPM
EKG DIAGNOSIS: NORMAL
EKG P AXIS: 70 DEGREES
EKG P-R INTERVAL: 150 MS
EKG Q-T INTERVAL: 358 MS
EKG QRS DURATION: 104 MS
EKG QTC CALCULATION (BAZETT): 482 MS
EKG R AXIS: 25 DEGREES
EKG T AXIS: 42 DEGREES
EKG VENTRICULAR RATE: 109 BPM

## 2021-01-07 ENCOUNTER — HOSPITAL ENCOUNTER (EMERGENCY)
Facility: HOSPITAL | Age: 46
Discharge: HOME | End: 2021-01-07
Attending: EMERGENCY MEDICINE
Payer: MEDICARE

## 2021-01-07 ENCOUNTER — APPOINTMENT (OUTPATIENT)
Dept: GENERAL RADIOLOGY | Facility: HOSPITAL | Age: 46
End: 2021-01-07
Payer: MEDICARE

## 2021-01-07 VITALS
OXYGEN SATURATION: 96 % | BODY MASS INDEX: 23.62 KG/M2 | HEIGHT: 70 IN | TEMPERATURE: 98.6 F | WEIGHT: 165 LBS | DIASTOLIC BLOOD PRESSURE: 86 MMHG | RESPIRATION RATE: 18 BRPM | HEART RATE: 100 BPM | SYSTOLIC BLOOD PRESSURE: 119 MMHG

## 2021-01-07 DIAGNOSIS — J02.9 VIRAL PHARYNGITIS: ICD-10-CM

## 2021-01-07 DIAGNOSIS — B34.9 ACUTE VIRAL SYNDROME: Primary | ICD-10-CM

## 2021-01-07 DIAGNOSIS — J06.9 ACUTE UPPER RESPIRATORY INFECTION: ICD-10-CM

## 2021-01-07 LAB
RAPID INFLUENZA  B AGN: NEGATIVE
RAPID INFLUENZA A AGN: NEGATIVE
S PYO AG THROAT QL: NEGATIVE

## 2021-01-07 PROCEDURE — 99283 EMERGENCY DEPT VISIT LOW MDM: CPT

## 2021-01-07 PROCEDURE — 87880 STREP A ASSAY W/OPTIC: CPT

## 2021-01-07 PROCEDURE — 87804 INFLUENZA ASSAY W/OPTIC: CPT

## 2021-01-07 PROCEDURE — 6360000002 HC RX W HCPCS: Performed by: EMERGENCY MEDICINE

## 2021-01-07 PROCEDURE — 71045 X-RAY EXAM CHEST 1 VIEW: CPT

## 2021-01-07 PROCEDURE — 6370000000 HC RX 637 (ALT 250 FOR IP): Performed by: EMERGENCY MEDICINE

## 2021-01-07 RX ORDER — IBUPROFEN 400 MG/1
400 TABLET ORAL ONCE
Status: COMPLETED | OUTPATIENT
Start: 2021-01-07 | End: 2021-01-07

## 2021-01-07 RX ORDER — DEXAMETHASONE SODIUM PHOSPHATE 10 MG/ML
10 INJECTION, SOLUTION INTRAMUSCULAR; INTRAVENOUS ONCE
Status: COMPLETED | OUTPATIENT
Start: 2021-01-07 | End: 2021-01-07

## 2021-01-07 RX ADMIN — DEXAMETHASONE SODIUM PHOSPHATE 10 MG: 10 INJECTION, SOLUTION INTRAMUSCULAR; INTRAVENOUS at 06:50

## 2021-01-07 RX ADMIN — IBUPROFEN 400 MG: 400 TABLET ORAL at 06:24

## 2021-01-07 ASSESSMENT — PAIN SCALES - GENERAL: PAINLEVEL_OUTOF10: 0
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
## 2021-02-12 ENCOUNTER — OFFICE VISIT (OUTPATIENT)
Dept: FAMILY MEDICINE CLINIC | Age: 46
End: 2021-02-12
Payer: COMMERCIAL

## 2021-02-12 VITALS
DIASTOLIC BLOOD PRESSURE: 86 MMHG | HEIGHT: 72 IN | OXYGEN SATURATION: 97 % | BODY MASS INDEX: 27.09 KG/M2 | WEIGHT: 200 LBS | RESPIRATION RATE: 16 BRPM | HEART RATE: 69 BPM | TEMPERATURE: 98.4 F | SYSTOLIC BLOOD PRESSURE: 118 MMHG

## 2021-02-12 DIAGNOSIS — J02.9 SORE THROAT: Primary | ICD-10-CM

## 2021-02-12 DIAGNOSIS — R68.89 FLU-LIKE SYMPTOMS: ICD-10-CM

## 2021-02-12 DIAGNOSIS — J40 BRONCHITIS WITH WHEEZING: ICD-10-CM

## 2021-02-12 LAB
Lab: NORMAL
QC PASS/FAIL: NORMAL
S PYO AG THROAT QL: NORMAL
SARS-COV-2, POC: NORMAL

## 2021-02-12 PROCEDURE — 87880 STREP A ASSAY W/OPTIC: CPT | Performed by: NURSE PRACTITIONER

## 2021-02-12 PROCEDURE — 87426 SARSCOV CORONAVIRUS AG IA: CPT | Performed by: NURSE PRACTITIONER

## 2021-02-12 PROCEDURE — 99214 OFFICE O/P EST MOD 30 MIN: CPT | Performed by: NURSE PRACTITIONER

## 2021-02-12 RX ORDER — ALBUTEROL SULFATE 90 UG/1
2 AEROSOL, METERED RESPIRATORY (INHALATION) 4 TIMES DAILY PRN
Qty: 1 INHALER | Refills: 0 | Status: SHIPPED
Start: 2021-02-12 | End: 2021-03-30 | Stop reason: SDUPTHER

## 2021-02-12 RX ORDER — AZITHROMYCIN 250 MG/1
250 TABLET, FILM COATED ORAL SEE ADMIN INSTRUCTIONS
Qty: 6 TABLET | Refills: 0 | Status: SHIPPED | OUTPATIENT
Start: 2021-02-12 | End: 2021-02-17

## 2021-02-12 RX ORDER — PREDNISONE 20 MG/1
20 TABLET ORAL 2 TIMES DAILY
Qty: 10 TABLET | Refills: 0 | Status: SHIPPED
Start: 2021-02-12 | End: 2021-12-16 | Stop reason: SDUPTHER

## 2021-02-12 NOTE — LETTER
[address] [facility] [place]  [address] [address]  Dept: [phone]  Dept Fax: [phone]    ADAN Kennedy NP      2/12/2021     Patient: Lorene Sanabria   YOB: 1975       To Whom It May Concern: It is my medical opinion that Lorene Sanabria should remain out of work while acutely ill and awaiting COVID-19 test results. Return to work with no retesting should be followed if test is negative AND meets these 3 criteria as outlined by CDC/ODH:   a. No fever without the use of fever reducers for 24 hours  b. Improvement in symptoms  c. At least 7 days since the onset of symptoms     If tests positive for COVID-19, needs minimum of 10 days strict quarantine, improvement of symptoms and 24 hours fever free without fever reducing medications. If you have any questions or concerns, please don't hesitate to call.     Sincerely,        ADAN Kennedy NP

## 2021-02-12 NOTE — PROGRESS NOTES
Chief Complaint   Nasal Congestion (x 4 days), Pharyngitis (headache), and Nausea      History of Present Illness   Source of history provided by: patient. Roman Ruiz is a 39 y.o. old male who presents to the flu clinic for evaluation of nasal congestion and sore throat X 4 days. Associated symptoms include chills, body aches, headache, NP cough, shortness of breath with exertion, nausea and diarrhea. Since onset symptoms have been about the same. Patient has had no known COVID exposure. Has taken Claritin and ibuprofen at home with some symptomatic relief. Denies any fever, CP, dyspnea, LE edema, abdominal pain, vomiting, rash, or lethargy. Denies any hx of asthma, COPD, pneumonia. Pertitent PMH of recurrent bronchitis. Has a history of tobacco abuse, but quit    ROS   Pertinent positives and negatives are stated within HPI, all other systems reviewed and are negative. Past Medical History:  has a past medical history of Anxiety and H/O Meniere's disease. Surgical History:  has a past surgical history that includes hernia repair and knee surgery (Left). Social History:  reports that he quit smoking about 9 years ago. He has never used smokeless tobacco. He reports that he does not drink alcohol or use drugs. Family History: family history is not on file. Allergies: Erythromycin    Physical Exam      VS:  /86   Pulse 69   Temp 98.4 °F (36.9 °C) (Infrared)   Resp 16   Ht 6' (1.829 m)   Wt 200 lb (90.7 kg)   SpO2 97%   BMI 27.12 kg/m²    Oxygen Saturation Interpretation: Normal.    Constitutional:  Alert, development consistent with age. NAD. Head:  NC/NT. Airway patent. No TTP over maxillary and frontal sinuses. Mouth: Posterior pharynx with mild erythema and clear postnasal drip. No tonsillar hypertrophy or exudate. Neck:  Normal ROM. Supple. No anterior cervical adenopathy noted. Lungs: CTAB without wheezes, rales, or rhonchi. CV:  Regular rate and rhythm, normal heart sounds, without pathological murmurs, ectopy, gallops, or rubs. Skin:  Normal turgor. Warm, dry, without visible rash. Lymphatic: No lymphangitis or adenopathy noted. Neurological:  Oriented. Motor functions intact. Lab / Imaging Results   (All laboratory and radiology results have been personally reviewed by myself)  Labs:  Results for orders placed or performed in visit on 02/12/21   POCT rapid strep A   Result Value Ref Range    Strep A Ag None Detected None Detected   POCT COVID-19, Antigen   Result Value Ref Range    SARS-COV-2, POC Not-Detected Not Detected    Lot Number [id number]     QC Pass/Fail pass        Imaging: All Radiology results interpreted by Radiologist unless otherwise noted. No results found. Medical Decision Making   Pt non-toxic, in no apparent distress and stable at time of discharge. Assessment/Plan   Pauline Mancera was seen today for nasal congestion, pharyngitis and nausea. Diagnoses and all orders for this visit:    Sore throat  -     POCT rapid strep A    Flu-like symptoms  -     POCT COVID-19, Antigen  -     COVID-19 Ambulatory    Bronchitis with wheezing  -     predniSONE (DELTASONE) 20 MG tablet; Take 1 tablet by mouth 2 times daily for 5 days  -     azithromycin (ZITHROMAX) 250 MG tablet; Take 1 tablet by mouth See Admin Instructions for 5 days 500mg on day 1 followed by 250mg on days 2 - 5  -     albuterol sulfate HFA (VENTOLIN HFA) 108 (90 Base) MCG/ACT inhaler;  Inhale 2 puffs into the lungs 4 times daily as needed for Wheezing or Shortness of Breath Rapid strep testing in office is negative. Rapid Covid testing in office is negative. COVID-19 swab obtained and pending, will call with results once available. Advised cautionary self-quarantine at home in the interim. If testing is positive, pt should remain out of work and in the general public for at least 10 days from the start of symptoms. Regardless of testing results, pt should also be fever free for 24 hours and symptoms should be improved overall prior to returning. A work slip was provided to the patient. Prescription written for prednisone and azithromycin, side effects were discussed with the patient. Advised to increase yogurt consumption or take probiotic. Albuterol inhaler prescribed, administration instructions discussed. Increase fluids and rest.  Advised patient to take zinc and vitamin C for immune support. Other symptomatic relief discussed including Tylenol prn pain/fever. Schedule virtual f/u with PCP in 7-10 days if symptoms persist.  Go to ED sooner if symptoms worsen or change. ED immediately with high or refractory fever, progressive SOB, dyspnea, CP, calf pain/swelling, shaking chills, vomiting, abdominal pain, lethargy, flank pain, or decreased urinary output. Pt verbalizes understanding and is in agreement with plan of care. All questions answered. Arlet Engel, ADAN - NP    This visit was provided as a focused evaluation during the COVID -19 pandemic/national emergency. A comprehensive review of all previous patient history and testing was not conducted. Pertinent findings were elicited during the visit. *NOTE: This report was transcribed using voice recognition software. Every effort was made to ensure accuracy; however, inadvertent computerized transcription errors may be present.

## 2021-02-14 LAB
SARS-COV-2: NOT DETECTED
SOURCE: NORMAL

## 2021-02-16 NOTE — RESULT ENCOUNTER NOTE
Covid testing is negative. The patient may discontinue quarantine if their symptoms are improving and they are fever free for 24 hours without the use of any antipyretic.

## 2021-03-30 ENCOUNTER — OFFICE VISIT (OUTPATIENT)
Dept: FAMILY MEDICINE CLINIC | Age: 46
End: 2021-03-30
Payer: COMMERCIAL

## 2021-03-30 VITALS
SYSTOLIC BLOOD PRESSURE: 138 MMHG | DIASTOLIC BLOOD PRESSURE: 82 MMHG | RESPIRATION RATE: 16 BRPM | BODY MASS INDEX: 27.12 KG/M2 | HEIGHT: 72 IN | TEMPERATURE: 97.8 F | HEART RATE: 79 BPM

## 2021-03-30 DIAGNOSIS — H66.90 ACUTE OTITIS MEDIA, UNSPECIFIED OTITIS MEDIA TYPE: ICD-10-CM

## 2021-03-30 DIAGNOSIS — J40 BRONCHITIS WITH WHEEZING: ICD-10-CM

## 2021-03-30 DIAGNOSIS — R06.02 SOB (SHORTNESS OF BREATH): Primary | ICD-10-CM

## 2021-03-30 DIAGNOSIS — R06.02 SOB (SHORTNESS OF BREATH): ICD-10-CM

## 2021-03-30 LAB
INFLUENZA A ANTIBODY: NORMAL
INFLUENZA B ANTIBODY: NORMAL

## 2021-03-30 PROCEDURE — 87804 INFLUENZA ASSAY W/OPTIC: CPT | Performed by: NURSE PRACTITIONER

## 2021-03-30 PROCEDURE — 99213 OFFICE O/P EST LOW 20 MIN: CPT | Performed by: NURSE PRACTITIONER

## 2021-03-30 RX ORDER — DEXAMETHASONE 6 MG/1
6 TABLET ORAL
Qty: 10 TABLET | Refills: 0 | Status: SHIPPED | OUTPATIENT
Start: 2021-03-30 | End: 2021-04-09

## 2021-03-30 RX ORDER — FLUTICASONE PROPIONATE 110 UG/1
2 AEROSOL, METERED RESPIRATORY (INHALATION) 2 TIMES DAILY
Qty: 1 INHALER | Refills: 3 | Status: SHIPPED
Start: 2021-03-30 | End: 2022-03-11

## 2021-03-30 RX ORDER — AMOXICILLIN 875 MG/1
875 TABLET, COATED ORAL 2 TIMES DAILY
Qty: 20 TABLET | Refills: 0 | Status: SHIPPED | OUTPATIENT
Start: 2021-03-30 | End: 2021-04-09

## 2021-03-30 RX ORDER — ALBUTEROL SULFATE 90 UG/1
2 AEROSOL, METERED RESPIRATORY (INHALATION) 4 TIMES DAILY PRN
Qty: 1 INHALER | Refills: 0 | Status: SHIPPED
Start: 2021-03-30 | End: 2021-12-16 | Stop reason: SDUPTHER

## 2021-03-30 SDOH — ECONOMIC STABILITY: TRANSPORTATION INSECURITY
IN THE PAST 12 MONTHS, HAS LACK OF TRANSPORTATION KEPT YOU FROM MEETINGS, WORK, OR FROM GETTING THINGS NEEDED FOR DAILY LIVING?: NO

## 2021-03-30 SDOH — ECONOMIC STABILITY: TRANSPORTATION INSECURITY
IN THE PAST 12 MONTHS, HAS THE LACK OF TRANSPORTATION KEPT YOU FROM MEDICAL APPOINTMENTS OR FROM GETTING MEDICATIONS?: NO

## 2021-03-30 SDOH — ECONOMIC STABILITY: FOOD INSECURITY: WITHIN THE PAST 12 MONTHS, THE FOOD YOU BOUGHT JUST DIDN'T LAST AND YOU DIDN'T HAVE MONEY TO GET MORE.: NEVER TRUE

## 2021-03-30 ASSESSMENT — ENCOUNTER SYMPTOMS
VOMITING: 0
CHEST TIGHTNESS: 1
NAUSEA: 0
SHORTNESS OF BREATH: 1
COUGH: 1
SORE THROAT: 0
SINUS PRESSURE: 1
DIARRHEA: 1

## 2021-03-30 NOTE — PATIENT INSTRUCTIONS
-  Conservative methods, OTCs for symptom relief provided  -  Start Vitamin C 1000 mg, Vitamin D 2000 IU, Zinc 50 mg  -  Ibuprofen, tylenol for fever and body aches  -  Red flag symptoms discussed  -  Rest and hydrate  -  Sleep on stomach  -  Deep breathing exercises  -  RTW 10 days from onset of symptoms as long as fever free without fever reducing meds and symptom improvement  -  Family must also quarantine for 10 days if positive COVID  -  Must self-quarantine, family must quarantine until results of PCR are back  -  Call office for letter to RTW if PCR for COVID is negative  -  Return to clinic/UC/ED for worsening symptoms, especially developing or worsening of respiratory symptoms  -  Symptoms of COVID can wax and wane  -  Call back to office if not feeling well enough to RTW/school in 10 days and we will extend letter for another 4 days

## 2021-03-30 NOTE — PROGRESS NOTES
Marciano Zapata (:  1975) is a 39 y.o. male,Established patient, here for evaluation of the following chief complaint(s):  Cough (since the  treated for bronchitis didnt finish antibiotic )      ASSESSMENT/PLAN:  1. SOB (shortness of breath)  -     COVID-19 Ambulatory; Future  -     POCT Influenza A/B  -     albuterol sulfate HFA (VENTOLIN HFA) 108 (90 Base) MCG/ACT inhaler; Inhale 2 puffs into the lungs 4 times daily as needed for Wheezing or Shortness of Breath, Disp-1 Inhaler, R-0Normal  -     dexamethasone (DECADRON) 6 MG tablet; Take 1 tablet by mouth daily (with breakfast) for 10 days, Disp-10 tablet, R-0Normal  -     fluticasone (FLOVENT HFA) 110 MCG/ACT inhaler; Inhale 2 puffs into the lungs 2 times daily, Disp-1 Inhaler, R-3Normal  -  Conservative methods, OTCs for symptom relief provided  -  Start Vitamin C 1000 mg, Vitamin D 2000 IU, Zinc 50 mg  -  Ibuprofen, tylenol for fever and body aches  -  Red flag symptoms discussed  -  Rest and hydrate  -  Sleep on stomach  -  Deep breathing exercises  -  Letter provided for work as needed  -  RTW 10 days from onset of symptoms as long as fever free without fever reducing meds and symptom improvement  -  Family must also quarantine for 10 days unless COVID is negative  -  Must self-quarantine, family must quarantine until results of PCR are back  -  Call office for letter to RTW if PCR for COVID is negative  -  Return to clinic/UC/ED for worsening symptoms, especially developing or worsening of respiratory symptoms  -  Symptoms of COVID can wax and wane  -  Call back to office if not feeling well enough to RTW/school in 10 days and we will extend letter for another 4 days  -  Patient verbalizes understanding  -  AVS provided with COVID information and instructions    2. Bronchitis with wheezing  -     albuterol sulfate HFA (VENTOLIN HFA) 108 (90 Base) MCG/ACT inhaler;  Inhale 2 puffs into the lungs 4 times daily as needed for Wheezing or Shortness of Breath, Disp-1 Inhaler, R-0Normal  - Reviewed side effects of medication and patient verbalizes understanding.   - Advised to call back directly if there are further questions, or if these symptoms fail to improve as anticipated or worsen. 3. Acute otitis media, unspecified otitis media type  -     amoxicillin (AMOXIL) 875 MG tablet; Take 1 tablet by mouth 2 times daily for 10 days, Disp-20 tablet, R-0Normal  - Reviewed side effects of medication and patient verbalizes understanding.   - Advised to call back directly if there are further questions, or if these symptoms fail to improve as anticipated or worsen. Return if symptoms worsen or fail to improve. SUBJECTIVE/OBJECTIVE:  HPI    Seen on 2/12 for COVID-like symptoms. COVID was negative. Treated for bronchitis with prednisone and Z-pack and albuterol. Did finish steroids but not ATX. Did not take 1-2 days of ATX. Symptoms resolved except for SOB. Still continued to use the inhaler. Went  Back to work and did good except for SOB with activity. What are symptoms: current symptoms started on 3/21. Cough, SOB worsening, sinus pressure, fevers 101.5. He has been taking Zyrtec. His symptoms have aggravated his Meniere syndrome. Date symptoms started: 3/21    Ill/COVID positive contacts: not sure    COVID vaccination: no    Where do you work/go to school: works as  at [employer], told he is not allowed there with symptoms    Who lives with you: wife and child    Review of Systems   Constitutional: Positive for activity change, appetite change, fatigue and fever. Negative for chills and diaphoresis. HENT: Positive for congestion, ear pain (right) and sinus pressure. Negative for sore throat. Respiratory: Positive for cough (dry), chest tightness and shortness of breath. Gastrointestinal: Positive for diarrhea. Negative for nausea and vomiting. Genitourinary: Negative for difficulty urinating.    Musculoskeletal: Positive for myalgias. Neurological: Positive for dizziness and headaches. Negative for weakness. Physical Exam  Constitutional:       Appearance: He is well-developed. HENT:      Head: Normocephalic and atraumatic. Right Ear: Tympanic membrane normal.      Left Ear: Tympanic membrane normal.   Neck:      Thyroid: No thyromegaly. Trachea: No tracheal deviation. Cardiovascular:      Rate and Rhythm: Normal rate and regular rhythm. Heart sounds: No murmur. Pulmonary:      Effort: Pulmonary effort is normal.      Breath sounds: Normal breath sounds. Abdominal:      General: Bowel sounds are normal.      Palpations: Abdomen is soft. Tenderness: There is no abdominal tenderness. Musculoskeletal: Normal range of motion. Lymphadenopathy:      Cervical: No cervical adenopathy. Skin:     General: Skin is warm and dry. Neurological:      Mental Status: He is alert and oriented to person, place, and time. Psychiatric:         Behavior: Behavior normal.           [facility] [place]      An electronic signature was used to authenticate this note.     --Myles Peter, ADAN - CNP

## 2021-04-01 LAB
SARS-COV-2: NOT DETECTED
SOURCE: NORMAL

## 2021-05-11 ENCOUNTER — HOSPITAL ENCOUNTER (EMERGENCY)
Facility: HOSPITAL | Age: 46
Discharge: HOME | End: 2021-05-11
Attending: EMERGENCY MEDICINE | Admitting: EMERGENCY MEDICINE

## 2021-05-11 VITALS
WEIGHT: 167 LBS | TEMPERATURE: 98.4 F | SYSTOLIC BLOOD PRESSURE: 132 MMHG | RESPIRATION RATE: 17 BRPM | HEIGHT: 70 IN | BODY MASS INDEX: 23.91 KG/M2 | DIASTOLIC BLOOD PRESSURE: 92 MMHG | HEART RATE: 112 BPM | OXYGEN SATURATION: 97 %

## 2021-05-11 DIAGNOSIS — F10.10 ALCOHOL ABUSE: Primary | ICD-10-CM

## 2021-05-11 DIAGNOSIS — R11.2 NON-INTRACTABLE VOMITING WITH NAUSEA, UNSPECIFIED VOMITING TYPE: ICD-10-CM

## 2021-05-11 LAB
ALBUMIN SERPL-MCNC: 4.7 G/DL (ref 3.5–5.2)
ALBUMIN/GLOB SERPL: 1.7 G/DL
ALP SERPL-CCNC: 95 U/L (ref 39–117)
ALT SERPL W P-5'-P-CCNC: 34 U/L (ref 1–41)
ANION GAP SERPL CALCULATED.3IONS-SCNC: 25.4 MMOL/L (ref 5–15)
AST SERPL-CCNC: 31 U/L (ref 1–40)
BASOPHILS # BLD AUTO: 0.08 10*3/MM3 (ref 0–0.2)
BASOPHILS NFR BLD AUTO: 0.5 % (ref 0–1.5)
BILIRUB SERPL-MCNC: 0.8 MG/DL (ref 0–1.2)
BUN SERPL-MCNC: 7 MG/DL (ref 6–20)
BUN/CREAT SERPL: 8.4 (ref 7–25)
CALCIUM SPEC-SCNC: 9.6 MG/DL (ref 8.6–10.5)
CHLORIDE SERPL-SCNC: 90 MMOL/L (ref 98–107)
CO2 SERPL-SCNC: 19.6 MMOL/L (ref 22–29)
CREAT SERPL-MCNC: 0.83 MG/DL (ref 0.76–1.27)
DEPRECATED RDW RBC AUTO: 44.5 FL (ref 37–54)
EOSINOPHIL # BLD AUTO: 0 10*3/MM3 (ref 0–0.4)
EOSINOPHIL NFR BLD AUTO: 0 % (ref 0.3–6.2)
ERYTHROCYTE [DISTWIDTH] IN BLOOD BY AUTOMATED COUNT: 13 % (ref 12.3–15.4)
ETHANOL BLD-MCNC: 87 MG/DL (ref 0–10)
ETHANOL UR QL: 0.09 %
GFR SERPL CREATININE-BSD FRML MDRD: 101 ML/MIN/1.73
GLOBULIN UR ELPH-MCNC: 2.7 GM/DL
GLUCOSE SERPL-MCNC: 92 MG/DL (ref 65–99)
HCT VFR BLD AUTO: 48.1 % (ref 37.5–51)
HGB BLD-MCNC: 17.3 G/DL (ref 13–17.7)
IMM GRANULOCYTES # BLD AUTO: 0.05 10*3/MM3 (ref 0–0.05)
IMM GRANULOCYTES NFR BLD AUTO: 0.3 % (ref 0–0.5)
LIPASE SERPL-CCNC: 24 U/L (ref 13–60)
LYMPHOCYTES # BLD AUTO: 2.05 10*3/MM3 (ref 0.7–3.1)
LYMPHOCYTES NFR BLD AUTO: 13.1 % (ref 19.6–45.3)
MCH RBC QN AUTO: 33.4 PG (ref 26.6–33)
MCHC RBC AUTO-ENTMCNC: 36 G/DL (ref 31.5–35.7)
MCV RBC AUTO: 92.9 FL (ref 79–97)
MONOCYTES # BLD AUTO: 0.9 10*3/MM3 (ref 0.1–0.9)
MONOCYTES NFR BLD AUTO: 5.7 % (ref 5–12)
NEUTROPHILS NFR BLD AUTO: 12.62 10*3/MM3 (ref 1.7–7)
NEUTROPHILS NFR BLD AUTO: 80.4 % (ref 42.7–76)
NRBC BLD AUTO-RTO: 0 /100 WBC (ref 0–0.2)
PLATELET # BLD AUTO: 333 10*3/MM3 (ref 140–450)
PMV BLD AUTO: 8.6 FL (ref 6–12)
POTASSIUM SERPL-SCNC: 3.3 MMOL/L (ref 3.5–5.2)
PROT SERPL-MCNC: 7.4 G/DL (ref 6–8.5)
RBC # BLD AUTO: 5.18 10*6/MM3 (ref 4.14–5.8)
SODIUM SERPL-SCNC: 135 MMOL/L (ref 136–145)
WBC # BLD AUTO: 15.7 10*3/MM3 (ref 3.4–10.8)

## 2021-05-11 PROCEDURE — 82077 ASSAY SPEC XCP UR&BREATH IA: CPT | Performed by: PHYSICIAN ASSISTANT

## 2021-05-11 PROCEDURE — 25010000002 DIAZEPAM PER 5 MG: Performed by: EMERGENCY MEDICINE

## 2021-05-11 PROCEDURE — 25010000002 ONDANSETRON PER 1 MG: Performed by: PHYSICIAN ASSISTANT

## 2021-05-11 PROCEDURE — 99283 EMERGENCY DEPT VISIT LOW MDM: CPT

## 2021-05-11 PROCEDURE — 80053 COMPREHEN METABOLIC PANEL: CPT | Performed by: PHYSICIAN ASSISTANT

## 2021-05-11 PROCEDURE — 85025 COMPLETE CBC W/AUTO DIFF WBC: CPT | Performed by: PHYSICIAN ASSISTANT

## 2021-05-11 PROCEDURE — 83690 ASSAY OF LIPASE: CPT | Performed by: PHYSICIAN ASSISTANT

## 2021-05-11 PROCEDURE — 96375 TX/PRO/DX INJ NEW DRUG ADDON: CPT

## 2021-05-11 PROCEDURE — 96374 THER/PROPH/DIAG INJ IV PUSH: CPT

## 2021-05-11 RX ORDER — PANTOPRAZOLE SODIUM 40 MG/10ML
40 INJECTION, POWDER, LYOPHILIZED, FOR SOLUTION INTRAVENOUS ONCE
Status: COMPLETED | OUTPATIENT
Start: 2021-05-11 | End: 2021-05-11

## 2021-05-11 RX ORDER — PANTOPRAZOLE SODIUM 40 MG/1
40 TABLET, DELAYED RELEASE ORAL DAILY
Qty: 30 TABLET | Refills: 0 | Status: SHIPPED | OUTPATIENT
Start: 2021-05-11 | End: 2021-05-13 | Stop reason: SDUPTHER

## 2021-05-11 RX ORDER — DIAZEPAM 5 MG/ML
5 INJECTION, SOLUTION INTRAMUSCULAR; INTRAVENOUS ONCE
Status: COMPLETED | OUTPATIENT
Start: 2021-05-11 | End: 2021-05-11

## 2021-05-11 RX ORDER — ONDANSETRON 2 MG/ML
4 INJECTION INTRAMUSCULAR; INTRAVENOUS ONCE
Status: COMPLETED | OUTPATIENT
Start: 2021-05-11 | End: 2021-05-11

## 2021-05-11 RX ORDER — SODIUM CHLORIDE 0.9 % (FLUSH) 0.9 %
10 SYRINGE (ML) INJECTION AS NEEDED
Status: DISCONTINUED | OUTPATIENT
Start: 2021-05-11 | End: 2021-05-11 | Stop reason: HOSPADM

## 2021-05-11 RX ORDER — CHLORDIAZEPOXIDE HYDROCHLORIDE 25 MG/1
CAPSULE, GELATIN COATED ORAL
Qty: 15 CAPSULE | Refills: 0 | Status: SHIPPED | OUTPATIENT
Start: 2021-05-11 | End: 2021-05-13 | Stop reason: SDUPTHER

## 2021-05-11 RX ORDER — BUSPIRONE HYDROCHLORIDE 15 MG/1
30 TABLET ORAL
COMMUNITY

## 2021-05-11 RX ORDER — ONDANSETRON 4 MG/1
4 TABLET, ORALLY DISINTEGRATING ORAL EVERY 6 HOURS PRN
Qty: 20 TABLET | Refills: 0 | Status: SHIPPED | OUTPATIENT
Start: 2021-05-11 | End: 2021-05-13 | Stop reason: SDUPTHER

## 2021-05-11 RX ADMIN — DIAZEPAM 5 MG: 5 INJECTION, SOLUTION INTRAMUSCULAR; INTRAVENOUS at 12:30

## 2021-05-11 RX ADMIN — ONDANSETRON 4 MG: 2 INJECTION INTRAMUSCULAR; INTRAVENOUS at 11:57

## 2021-05-11 RX ADMIN — SODIUM CHLORIDE 1000 ML: 9 INJECTION, SOLUTION INTRAVENOUS at 11:57

## 2021-05-11 RX ADMIN — PANTOPRAZOLE SODIUM 40 MG: 40 INJECTION, POWDER, FOR SOLUTION INTRAVENOUS at 11:58

## 2021-05-12 RX ORDER — CHLORDIAZEPOXIDE HYDROCHLORIDE 25 MG/1
CAPSULE, GELATIN COATED ORAL
Qty: 15 CAPSULE | Refills: 0 | Status: SHIPPED | OUTPATIENT
Start: 2021-05-12 | End: 2021-05-13 | Stop reason: SDUPTHER

## 2021-05-13 RX ORDER — ONDANSETRON 4 MG/1
4 TABLET, ORALLY DISINTEGRATING ORAL EVERY 6 HOURS PRN
Qty: 20 TABLET | Refills: 0 | Status: SHIPPED | OUTPATIENT
Start: 2021-05-13 | End: 2022-02-15

## 2021-05-13 RX ORDER — PANTOPRAZOLE SODIUM 40 MG/1
40 TABLET, DELAYED RELEASE ORAL DAILY
Qty: 30 TABLET | Refills: 0 | Status: ON HOLD | OUTPATIENT
Start: 2021-05-13 | End: 2022-02-15 | Stop reason: DRUGHIGH

## 2021-05-13 RX ORDER — ONDANSETRON 4 MG/1
4 TABLET, ORALLY DISINTEGRATING ORAL EVERY 6 HOURS PRN
Qty: 20 TABLET | Refills: 0 | Status: SHIPPED | OUTPATIENT
Start: 2021-05-13 | End: 2021-05-13 | Stop reason: SDUPTHER

## 2021-05-13 RX ORDER — CHLORDIAZEPOXIDE HYDROCHLORIDE 25 MG/1
CAPSULE, GELATIN COATED ORAL
Qty: 15 CAPSULE | Refills: 0 | Status: SHIPPED | OUTPATIENT
Start: 2021-05-13 | End: 2021-05-13 | Stop reason: SDUPTHER

## 2021-05-13 RX ORDER — PANTOPRAZOLE SODIUM 40 MG/1
40 TABLET, DELAYED RELEASE ORAL DAILY
Qty: 30 TABLET | Refills: 0 | Status: SHIPPED | OUTPATIENT
Start: 2021-05-13 | End: 2021-05-13 | Stop reason: SDUPTHER

## 2021-05-13 RX ORDER — CHLORDIAZEPOXIDE HYDROCHLORIDE 25 MG/1
CAPSULE, GELATIN COATED ORAL
Qty: 15 CAPSULE | Refills: 0 | Status: SHIPPED | OUTPATIENT
Start: 2021-05-13 | End: 2022-02-15

## 2021-06-17 ENCOUNTER — OFFICE VISIT (OUTPATIENT)
Dept: FAMILY MEDICINE CLINIC | Age: 46
End: 2021-06-17
Payer: COMMERCIAL

## 2021-06-17 VITALS
BODY MASS INDEX: 27.09 KG/M2 | RESPIRATION RATE: 16 BRPM | HEART RATE: 72 BPM | WEIGHT: 200 LBS | DIASTOLIC BLOOD PRESSURE: 82 MMHG | HEIGHT: 72 IN | OXYGEN SATURATION: 94 % | SYSTOLIC BLOOD PRESSURE: 122 MMHG | TEMPERATURE: 98.7 F

## 2021-06-17 DIAGNOSIS — R05.9 COUGH: ICD-10-CM

## 2021-06-17 DIAGNOSIS — R68.89 FLU-LIKE SYMPTOMS: Primary | ICD-10-CM

## 2021-06-17 DIAGNOSIS — R09.81 NASAL CONGESTION: ICD-10-CM

## 2021-06-17 LAB
INFLUENZA A ANTIBODY: NEGATIVE
INFLUENZA B ANTIBODY: NEGATIVE

## 2021-06-17 PROCEDURE — 99213 OFFICE O/P EST LOW 20 MIN: CPT | Performed by: NURSE PRACTITIONER

## 2021-06-17 PROCEDURE — 87804 INFLUENZA ASSAY W/OPTIC: CPT | Performed by: NURSE PRACTITIONER

## 2021-06-17 RX ORDER — DEXTROMETHORPHAN HYDROBROMIDE AND PROMETHAZINE HYDROCHLORIDE 15; 6.25 MG/5ML; MG/5ML
5 SYRUP ORAL 4 TIMES DAILY PRN
Qty: 120 ML | Refills: 0 | Status: SHIPPED | OUTPATIENT
Start: 2021-06-17 | End: 2021-06-27

## 2021-06-17 ASSESSMENT — ENCOUNTER SYMPTOMS
VOMITING: 0
SORE THROAT: 1
NAUSEA: 0
COUGH: 1
SINUS PRESSURE: 1
WHEEZING: 0
SHORTNESS OF BREATH: 0
DIARRHEA: 0

## 2021-06-17 NOTE — PROGRESS NOTES
Gabby Williamson (:  1975) is a 39 y.o. male,Established patient, here for evaluation of the following chief complaint(s):  Cough and Nasal Congestion         ASSESSMENT/PLAN:  1. Flu-like symptoms  -     POCT Influenza A/B  2. Cough  -     COVID-19 Ambulatory; Future  -     promethazine-dextromethorphan (PROMETHAZINE-DM) 6.25-15 MG/5ML syrup; Take 5 mLs by mouth 4 times daily as needed for Cough, Disp-120 mL, R-0Normal  - Reviewed side effects of medication and patient verbalizes understanding.   - Advised to call back directly if there are further questions, or if these symptoms fail to improve as anticipated or worsen. 3. Nasal congestion  -     COVID-19 Ambulatory; Future  -  Conservative methods, OTCs for symptom relief provided  -  Start Vitamin C 1000 mg, Vitamin D 2000 IU, Zinc 50 mg  -  Ibuprofen, tylenol for fever and body aches  -  Red flag symptoms discussed  -  Rest and hydrate  -  Sleep on stomach  -  Deep breathing exercises  -  Letter provided for school/work as needed  -  Must self-quarantine, family must quarantine until results of PCR are back  -  Call office for letter to RTW if PCR for COVID is negative  -  Return to clinic/UC/ED for worsening symptoms, especially developing or worsening of respiratory symptoms  -  Symptoms of COVID can wax and wane  -  Patient verbalizes understanding      Return if symptoms worsen or fail to improve. Subjective   SUBJECTIVE/OBJECTIVE:  HPI    What are symptoms: see above    Date symptoms started:     Ill/COVID positive contacts: yes    COVID vaccination: no    Where do you work/go to school:     Who lives with you: wife and grand-daughter    Review of Systems   Constitutional: Positive for activity change (decreased), appetite change (decreased), chills and fatigue. Negative for diaphoresis and fever.         No loss of taste, smell affected by nasal congestion   HENT: Positive for congestion (nasal), ear pain, sinus pressure and sore throat. Respiratory: Positive for cough. Negative for shortness of breath and wheezing. Gastrointestinal: Negative for diarrhea, nausea and vomiting. Genitourinary: Negative for difficulty urinating. Neurological: Positive for dizziness. Negative for weakness and headaches. Objective   Physical Exam  Constitutional:       Appearance: He is well-developed. HENT:      Head: Normocephalic and atraumatic. Right Ear: Tympanic membrane normal.      Left Ear: Tympanic membrane normal.      Nose:      Right Turbinates: Enlarged and swollen. Left Turbinates: Enlarged and swollen. Mouth/Throat:      Pharynx: Posterior oropharyngeal erythema present. Neck:      Thyroid: No thyromegaly. Trachea: No tracheal deviation. Cardiovascular:      Rate and Rhythm: Normal rate and regular rhythm. Heart sounds: No murmur heard. Pulmonary:      Effort: Pulmonary effort is normal.      Breath sounds: Normal breath sounds. Abdominal:      General: Bowel sounds are normal.      Palpations: Abdomen is soft. Tenderness: There is no abdominal tenderness. Musculoskeletal:         General: Normal range of motion. Lymphadenopathy:      Cervical: No cervical adenopathy. Skin:     General: Skin is warm and dry. Neurological:      Mental Status: He is alert and oriented to person, place, and time. Psychiatric:         Behavior: Behavior normal.            [facility]      An electronic signature was used to authenticate this note.     --ADAN Camargo - CNP

## 2021-06-20 LAB
SARS-COV-2: NOT DETECTED
SOURCE: NORMAL

## 2021-06-23 ENCOUNTER — TELEPHONE (OUTPATIENT)
Dept: FAMILY MEDICINE CLINIC | Age: 46
End: 2021-06-23

## 2021-06-29 ENCOUNTER — APPOINTMENT (OUTPATIENT)
Dept: GENERAL RADIOLOGY | Facility: HOSPITAL | Age: 46
End: 2021-06-29

## 2021-06-29 ENCOUNTER — HOSPITAL ENCOUNTER (EMERGENCY)
Facility: HOSPITAL | Age: 46
Discharge: ANOTHER HOSPITAL | End: 2021-06-30
Attending: EMERGENCY MEDICINE | Admitting: EMERGENCY MEDICINE

## 2021-06-29 DIAGNOSIS — G40.909 SEIZURE DISORDER (HCC): Primary | ICD-10-CM

## 2021-06-29 DIAGNOSIS — R41.82 ALTERED MENTAL STATUS, UNSPECIFIED ALTERED MENTAL STATUS TYPE: ICD-10-CM

## 2021-06-29 LAB
ALBUMIN SERPL-MCNC: 4.4 G/DL (ref 3.5–5.2)
ALBUMIN/GLOB SERPL: 1.8 G/DL
ALP SERPL-CCNC: 69 U/L (ref 39–117)
ALT SERPL W P-5'-P-CCNC: 22 U/L (ref 1–41)
ANION GAP SERPL CALCULATED.3IONS-SCNC: 20.1 MMOL/L (ref 5–15)
AST SERPL-CCNC: 28 U/L (ref 1–40)
BASOPHILS # BLD AUTO: 0.04 10*3/MM3 (ref 0–0.2)
BASOPHILS NFR BLD AUTO: 0.3 % (ref 0–1.5)
BILIRUB SERPL-MCNC: 0.4 MG/DL (ref 0–1.2)
BUN SERPL-MCNC: 8 MG/DL (ref 6–20)
BUN/CREAT SERPL: 7 (ref 7–25)
CALCIUM SPEC-SCNC: 9.4 MG/DL (ref 8.6–10.5)
CHLORIDE SERPL-SCNC: 101 MMOL/L (ref 98–107)
CO2 SERPL-SCNC: 16.9 MMOL/L (ref 22–29)
CREAT SERPL-MCNC: 1.15 MG/DL (ref 0.76–1.27)
DEPRECATED RDW RBC AUTO: 43.6 FL (ref 37–54)
EOSINOPHIL # BLD AUTO: 0.03 10*3/MM3 (ref 0–0.4)
EOSINOPHIL NFR BLD AUTO: 0.3 % (ref 0.3–6.2)
ERYTHROCYTE [DISTWIDTH] IN BLOOD BY AUTOMATED COUNT: 12.5 % (ref 12.3–15.4)
ETHANOL BLD-MCNC: <10 MG/DL (ref 0–10)
ETHANOL UR QL: <0.01 %
GFR SERPL CREATININE-BSD FRML MDRD: 69 ML/MIN/1.73
GLOBULIN UR ELPH-MCNC: 2.4 GM/DL
GLUCOSE SERPL-MCNC: 167 MG/DL (ref 65–99)
HCT VFR BLD AUTO: 42.6 % (ref 37.5–51)
HGB BLD-MCNC: 14.8 G/DL (ref 13–17.7)
IMM GRANULOCYTES # BLD AUTO: 0.05 10*3/MM3 (ref 0–0.05)
IMM GRANULOCYTES NFR BLD AUTO: 0.4 % (ref 0–0.5)
LYMPHOCYTES # BLD AUTO: 0.83 10*3/MM3 (ref 0.7–3.1)
LYMPHOCYTES NFR BLD AUTO: 7.2 % (ref 19.6–45.3)
MAGNESIUM SERPL-MCNC: 3.2 MG/DL (ref 1.6–2.6)
MCH RBC QN AUTO: 32.8 PG (ref 26.6–33)
MCHC RBC AUTO-ENTMCNC: 34.7 G/DL (ref 31.5–35.7)
MCV RBC AUTO: 94.5 FL (ref 79–97)
MONOCYTES # BLD AUTO: 0.5 10*3/MM3 (ref 0.1–0.9)
MONOCYTES NFR BLD AUTO: 4.4 % (ref 5–12)
NEUTROPHILS NFR BLD AUTO: 10 10*3/MM3 (ref 1.7–7)
NEUTROPHILS NFR BLD AUTO: 87.4 % (ref 42.7–76)
NRBC BLD AUTO-RTO: 0 /100 WBC (ref 0–0.2)
PLATELET # BLD AUTO: 329 10*3/MM3 (ref 140–450)
PMV BLD AUTO: 9.1 FL (ref 6–12)
POTASSIUM SERPL-SCNC: 3.9 MMOL/L (ref 3.5–5.2)
PROT SERPL-MCNC: 6.8 G/DL (ref 6–8.5)
RBC # BLD AUTO: 4.51 10*6/MM3 (ref 4.14–5.8)
SODIUM SERPL-SCNC: 138 MMOL/L (ref 136–145)
WBC # BLD AUTO: 11.45 10*3/MM3 (ref 3.4–10.8)

## 2021-06-29 PROCEDURE — 25010000003 LEVETIRACETAM IN NACL 0.75% 1000 MG/100ML SOLUTION: Performed by: PHYSICIAN ASSISTANT

## 2021-06-29 PROCEDURE — 96375 TX/PRO/DX INJ NEW DRUG ADDON: CPT

## 2021-06-29 PROCEDURE — 93005 ELECTROCARDIOGRAM TRACING: CPT

## 2021-06-29 PROCEDURE — 96374 THER/PROPH/DIAG INJ IV PUSH: CPT

## 2021-06-29 PROCEDURE — 99284 EMERGENCY DEPT VISIT MOD MDM: CPT

## 2021-06-29 PROCEDURE — 25010000003 LEVETIRACETAM IN NACL 0.54% 1500 MG/100ML SOLUTION: Performed by: PHYSICIAN ASSISTANT

## 2021-06-29 PROCEDURE — 25010000002 ONDANSETRON PER 1 MG: Performed by: PHYSICIAN ASSISTANT

## 2021-06-29 PROCEDURE — 82077 ASSAY SPEC XCP UR&BREATH IA: CPT | Performed by: PHYSICIAN ASSISTANT

## 2021-06-29 PROCEDURE — 96361 HYDRATE IV INFUSION ADD-ON: CPT

## 2021-06-29 PROCEDURE — 25010000002 HALOPERIDOL LACTATE PER 5 MG: Performed by: PHYSICIAN ASSISTANT

## 2021-06-29 PROCEDURE — 71045 X-RAY EXAM CHEST 1 VIEW: CPT

## 2021-06-29 PROCEDURE — 83735 ASSAY OF MAGNESIUM: CPT | Performed by: PHYSICIAN ASSISTANT

## 2021-06-29 PROCEDURE — 96372 THER/PROPH/DIAG INJ SC/IM: CPT

## 2021-06-29 PROCEDURE — 25010000002 DIPHENHYDRAMINE PER 50 MG: Performed by: PHYSICIAN ASSISTANT

## 2021-06-29 PROCEDURE — 96376 TX/PRO/DX INJ SAME DRUG ADON: CPT

## 2021-06-29 PROCEDURE — 25010000002 LORAZEPAM PER 2 MG

## 2021-06-29 PROCEDURE — 80053 COMPREHEN METABOLIC PANEL: CPT | Performed by: PHYSICIAN ASSISTANT

## 2021-06-29 PROCEDURE — 85025 COMPLETE CBC W/AUTO DIFF WBC: CPT | Performed by: PHYSICIAN ASSISTANT

## 2021-06-29 RX ORDER — DIPHENHYDRAMINE HYDROCHLORIDE 50 MG/ML
50 INJECTION INTRAMUSCULAR; INTRAVENOUS ONCE
Status: COMPLETED | OUTPATIENT
Start: 2021-06-29 | End: 2021-06-29

## 2021-06-29 RX ORDER — LEVETIRACETAM 15 MG/ML
1500 INJECTION INTRAVASCULAR ONCE
Status: COMPLETED | OUTPATIENT
Start: 2021-06-29 | End: 2021-06-29

## 2021-06-29 RX ORDER — LORAZEPAM 2 MG/ML
INJECTION INTRAMUSCULAR
Status: COMPLETED
Start: 2021-06-29 | End: 2021-06-29

## 2021-06-29 RX ORDER — HALOPERIDOL 5 MG/ML
5 INJECTION INTRAMUSCULAR ONCE
Status: COMPLETED | OUTPATIENT
Start: 2021-06-29 | End: 2021-06-29

## 2021-06-29 RX ORDER — ONDANSETRON 2 MG/ML
4 INJECTION INTRAMUSCULAR; INTRAVENOUS ONCE
Status: COMPLETED | OUTPATIENT
Start: 2021-06-29 | End: 2021-06-29

## 2021-06-29 RX ORDER — LORAZEPAM 2 MG/ML
2 INJECTION INTRAMUSCULAR ONCE
Status: COMPLETED | OUTPATIENT
Start: 2021-06-29 | End: 2021-06-29

## 2021-06-29 RX ORDER — LEVETIRACETAM 10 MG/ML
1000 INJECTION INTRAVASCULAR ONCE
Status: COMPLETED | OUTPATIENT
Start: 2021-06-29 | End: 2021-06-29

## 2021-06-29 RX ADMIN — SODIUM CHLORIDE 1000 ML: 9 INJECTION, SOLUTION INTRAVENOUS at 19:04

## 2021-06-29 RX ADMIN — LEVETIRACETAM 1000 MG: 10 INJECTION INTRAVENOUS at 22:29

## 2021-06-29 RX ADMIN — LORAZEPAM 2 MG: 2 INJECTION INTRAMUSCULAR at 20:00

## 2021-06-29 RX ADMIN — ONDANSETRON HYDROCHLORIDE 4 MG: 2 INJECTION, SOLUTION INTRAMUSCULAR; INTRAVENOUS at 19:21

## 2021-06-29 RX ADMIN — LORAZEPAM 2 MG: 2 INJECTION INTRAMUSCULAR at 20:14

## 2021-06-29 RX ADMIN — HALOPERIDOL LACTATE 5 MG: 5 INJECTION, SOLUTION INTRAMUSCULAR at 20:21

## 2021-06-29 RX ADMIN — LORAZEPAM 2 MG: 2 INJECTION INTRAMUSCULAR; INTRAVENOUS at 20:00

## 2021-06-29 RX ADMIN — DIPHENHYDRAMINE HYDROCHLORIDE 50 MG: 50 INJECTION INTRAMUSCULAR; INTRAVENOUS at 20:35

## 2021-06-29 RX ADMIN — LEVETIRACETAM 1500 MG: 15 INJECTION INTRAVENOUS at 20:34

## 2021-06-29 RX ADMIN — LORAZEPAM 2 MG: 2 INJECTION INTRAMUSCULAR; INTRAVENOUS at 20:14

## 2021-06-30 VITALS
DIASTOLIC BLOOD PRESSURE: 74 MMHG | HEART RATE: 103 BPM | RESPIRATION RATE: 20 BRPM | BODY MASS INDEX: 24.34 KG/M2 | TEMPERATURE: 98.2 F | SYSTOLIC BLOOD PRESSURE: 117 MMHG | WEIGHT: 170 LBS | OXYGEN SATURATION: 94 % | HEIGHT: 70 IN

## 2021-07-03 ENCOUNTER — HOSPITAL ENCOUNTER (EMERGENCY)
Facility: HOSPITAL | Age: 46
Discharge: HOME | End: 2021-07-03
Attending: EMERGENCY MEDICINE | Admitting: EMERGENCY MEDICINE

## 2021-07-03 ENCOUNTER — APPOINTMENT (OUTPATIENT)
Dept: CT IMAGING | Facility: HOSPITAL | Age: 46
End: 2021-07-03

## 2021-07-03 ENCOUNTER — APPOINTMENT (OUTPATIENT)
Dept: GENERAL RADIOLOGY | Facility: HOSPITAL | Age: 46
End: 2021-07-03

## 2021-07-03 VITALS
BODY MASS INDEX: 24.34 KG/M2 | SYSTOLIC BLOOD PRESSURE: 130 MMHG | DIASTOLIC BLOOD PRESSURE: 88 MMHG | RESPIRATION RATE: 18 BRPM | OXYGEN SATURATION: 95 % | HEIGHT: 70 IN | HEART RATE: 98 BPM | TEMPERATURE: 98.2 F | WEIGHT: 170 LBS

## 2021-07-03 DIAGNOSIS — F10.10 ALCOHOL ABUSE: Primary | ICD-10-CM

## 2021-07-03 DIAGNOSIS — R74.8 ELEVATED LIVER ENZYMES: ICD-10-CM

## 2021-07-03 DIAGNOSIS — G40.909 SEIZURE DISORDER (HCC): ICD-10-CM

## 2021-07-03 DIAGNOSIS — R41.89 BRAIN FOG: ICD-10-CM

## 2021-07-03 LAB
ALBUMIN SERPL-MCNC: 3.9 G/DL (ref 3.5–5.2)
ALBUMIN/GLOB SERPL: 1.5 G/DL
ALP SERPL-CCNC: 62 U/L (ref 39–117)
ALT SERPL W P-5'-P-CCNC: 57 U/L (ref 1–41)
AMPHET+METHAMPHET UR QL: NEGATIVE
AMPHETAMINES UR QL: NEGATIVE
ANION GAP SERPL CALCULATED.3IONS-SCNC: 12.2 MMOL/L (ref 5–15)
AST SERPL-CCNC: 209 U/L (ref 1–40)
BACTERIA UR QL AUTO: ABNORMAL /HPF
BARBITURATES UR QL SCN: NEGATIVE
BASOPHILS # BLD AUTO: 0.09 10*3/MM3 (ref 0–0.2)
BASOPHILS NFR BLD AUTO: 1.1 % (ref 0–1.5)
BENZODIAZ UR QL SCN: POSITIVE
BILIRUB SERPL-MCNC: 0.4 MG/DL (ref 0–1.2)
BILIRUB UR QL STRIP: NEGATIVE
BUN SERPL-MCNC: 7 MG/DL (ref 6–20)
BUN/CREAT SERPL: 5.6 (ref 7–25)
BUPRENORPHINE SERPL-MCNC: NEGATIVE NG/ML
CALCIUM SPEC-SCNC: 9.5 MG/DL (ref 8.6–10.5)
CANNABINOIDS SERPL QL: POSITIVE
CHLORIDE SERPL-SCNC: 105 MMOL/L (ref 98–107)
CLARITY UR: CLEAR
CO2 SERPL-SCNC: 25.8 MMOL/L (ref 22–29)
COCAINE UR QL: NEGATIVE
COLOR UR: YELLOW
CREAT SERPL-MCNC: 1.25 MG/DL (ref 0.76–1.27)
DEPRECATED RDW RBC AUTO: 45.4 FL (ref 37–54)
EOSINOPHIL # BLD AUTO: 0.19 10*3/MM3 (ref 0–0.4)
EOSINOPHIL NFR BLD AUTO: 2.3 % (ref 0.3–6.2)
ERYTHROCYTE [DISTWIDTH] IN BLOOD BY AUTOMATED COUNT: 12.7 % (ref 12.3–15.4)
GFR SERPL CREATININE-BSD FRML MDRD: 63 ML/MIN/1.73
GLOBULIN UR ELPH-MCNC: 2.6 GM/DL
GLUCOSE SERPL-MCNC: 119 MG/DL (ref 65–99)
GLUCOSE UR STRIP-MCNC: NEGATIVE MG/DL
HCT VFR BLD AUTO: 43.2 % (ref 37.5–51)
HGB BLD-MCNC: 14.5 G/DL (ref 13–17.7)
HGB UR QL STRIP.AUTO: ABNORMAL
HOLD SPECIMEN: NORMAL
HYALINE CASTS UR QL AUTO: ABNORMAL /LPF
IMM GRANULOCYTES # BLD AUTO: 0.02 10*3/MM3 (ref 0–0.05)
IMM GRANULOCYTES NFR BLD AUTO: 0.2 % (ref 0–0.5)
KETONES UR QL STRIP: NEGATIVE
LEUKOCYTE ESTERASE UR QL STRIP.AUTO: NEGATIVE
LYMPHOCYTES # BLD AUTO: 1.39 10*3/MM3 (ref 0.7–3.1)
LYMPHOCYTES NFR BLD AUTO: 16.6 % (ref 19.6–45.3)
MAGNESIUM SERPL-MCNC: 1.9 MG/DL (ref 1.6–2.6)
MCH RBC QN AUTO: 32.5 PG (ref 26.6–33)
MCHC RBC AUTO-ENTMCNC: 33.6 G/DL (ref 31.5–35.7)
MCV RBC AUTO: 96.9 FL (ref 79–97)
METHADONE UR QL SCN: NEGATIVE
MONOCYTES # BLD AUTO: 0.42 10*3/MM3 (ref 0.1–0.9)
MONOCYTES NFR BLD AUTO: 5 % (ref 5–12)
NEUTROPHILS NFR BLD AUTO: 6.24 10*3/MM3 (ref 1.7–7)
NEUTROPHILS NFR BLD AUTO: 74.8 % (ref 42.7–76)
NITRITE UR QL STRIP: NEGATIVE
NRBC BLD AUTO-RTO: 0 /100 WBC (ref 0–0.2)
OPIATES UR QL: NEGATIVE
OXYCODONE UR QL SCN: POSITIVE
PCP UR QL SCN: NEGATIVE
PH UR STRIP.AUTO: 7 [PH] (ref 5–8)
PLATELET # BLD AUTO: 347 10*3/MM3 (ref 140–450)
PMV BLD AUTO: 9 FL (ref 6–12)
POTASSIUM SERPL-SCNC: 3.6 MMOL/L (ref 3.5–5.2)
PROPOXYPH UR QL: NEGATIVE
PROT SERPL-MCNC: 6.5 G/DL (ref 6–8.5)
PROT UR QL STRIP: NEGATIVE
RBC # BLD AUTO: 4.46 10*6/MM3 (ref 4.14–5.8)
RBC # UR: ABNORMAL /HPF
REF LAB TEST METHOD: ABNORMAL
SODIUM SERPL-SCNC: 143 MMOL/L (ref 136–145)
SP GR UR STRIP: 1.01 (ref 1–1.03)
SQUAMOUS #/AREA URNS HPF: ABNORMAL /HPF
TRICYCLICS UR QL SCN: NEGATIVE
TROPONIN T SERPL-MCNC: <0.01 NG/ML (ref 0–0.03)
UROBILINOGEN UR QL STRIP: ABNORMAL
WBC # BLD AUTO: 8.35 10*3/MM3 (ref 3.4–10.8)
WBC UR QL AUTO: ABNORMAL /HPF
WHOLE BLOOD HOLD SPECIMEN: NORMAL

## 2021-07-03 PROCEDURE — 80053 COMPREHEN METABOLIC PANEL: CPT

## 2021-07-03 PROCEDURE — 81001 URINALYSIS AUTO W/SCOPE: CPT

## 2021-07-03 PROCEDURE — 83735 ASSAY OF MAGNESIUM: CPT

## 2021-07-03 PROCEDURE — 84484 ASSAY OF TROPONIN QUANT: CPT

## 2021-07-03 PROCEDURE — 93005 ELECTROCARDIOGRAM TRACING: CPT

## 2021-07-03 PROCEDURE — 85025 COMPLETE CBC W/AUTO DIFF WBC: CPT

## 2021-07-03 PROCEDURE — 25010000002 LORAZEPAM PER 2 MG: Performed by: EMERGENCY MEDICINE

## 2021-07-03 PROCEDURE — 96374 THER/PROPH/DIAG INJ IV PUSH: CPT

## 2021-07-03 PROCEDURE — 80306 DRUG TEST PRSMV INSTRMNT: CPT | Performed by: EMERGENCY MEDICINE

## 2021-07-03 PROCEDURE — 99284 EMERGENCY DEPT VISIT MOD MDM: CPT

## 2021-07-03 PROCEDURE — 70450 CT HEAD/BRAIN W/O DYE: CPT

## 2021-07-03 PROCEDURE — 71045 X-RAY EXAM CHEST 1 VIEW: CPT

## 2021-07-03 RX ORDER — SODIUM CHLORIDE 0.9 % (FLUSH) 0.9 %
10 SYRINGE (ML) INJECTION AS NEEDED
Status: DISCONTINUED | OUTPATIENT
Start: 2021-07-03 | End: 2021-07-03 | Stop reason: HOSPADM

## 2021-07-03 RX ORDER — LORAZEPAM 2 MG/ML
1 INJECTION INTRAMUSCULAR ONCE
Status: COMPLETED | OUTPATIENT
Start: 2021-07-03 | End: 2021-07-03

## 2021-07-03 RX ADMIN — SODIUM CHLORIDE 1000 ML: 9 INJECTION, SOLUTION INTRAVENOUS at 14:55

## 2021-07-03 RX ADMIN — LORAZEPAM 1 MG: 2 INJECTION INTRAMUSCULAR; INTRAVENOUS at 14:56

## 2021-07-03 NOTE — ED PROVIDER NOTES
"Subjective   45-year-old male presenting with feeling \"foggy\".  Patient was just admitted to the hospital in [place] for seizure activity, was transferred from our ED.  it looks like he was started on Keppra. like he is in a fog.  Feels fatigued.  N He states that since being home he has felt his mother states that he looks like he is going to have another seizure.  He denies any further seizure-like activity.  He does have some chronic back pain which seems to be acting up on him.  No other complaints or concerns.          Review of Systems   Constitutional: Positive for fatigue.   HENT: Negative.    Eyes: Negative.    Respiratory: Negative.    Cardiovascular: Negative.    Gastrointestinal: Negative.    Genitourinary: Negative.    Musculoskeletal: Positive for back pain.   Skin: Negative.    Neurological: Negative.    Psychiatric/Behavioral:        \"Fogginess\"       Past Medical History:   Diagnosis Date   • Anxiety    • Arthritis    • Back pain, chronic    • Carpal tunnel syndrome    • Constipation    • Depression    • Diverticulosis    • Fibromyalgia    • Fractures    • GERD (gastroesophageal reflux disease)    • Head ache    • Hiatal hernia    • History of blood transfusion    • Hypertension     pt states no longer has it, was from anxiety attacks.    • Migraine    • Shoulder injury    • Tendonitis    • Tobacco abuse    • Traumatic brain injury (CMS/HCC)        Allergies   Allergen Reactions   • Shellfish-Derived Products Arrhythmia   • Sulfa Antibiotics Swelling   • Amitriptyline Other (See Comments)     Made him mean     • Buspirone Other (See Comments)     Didn't \"feel right\"  Didn't \"feel right\"   • Duloxetine Hcl Other (See Comments)     Urinary retention  Urinary retention   • Penicillins Rash   • Venlafaxine Nausea And Vomiting       Past Surgical History:   Procedure Laterality Date   • COLONOSCOPY  2019   • EYE SURGERY Left 02/1999    reconstruction   • EYE SURGERY      reconstruction of left eye    • " SPINAL CORD STIMULATOR IMPLANT N/A 2017    Procedure: INTRATHECAL CATHETER PLACEMENT ;  Surgeon: Aristides Zafar MD;  Location: [facility];  Service:    • TRACHEOSTOMY     • WISDOM TOOTH EXTRACTION         Family History   Problem Relation Age of Onset   • Arthritis Mother    • Other Mother         Malignant neoplasm   • Arthritis Other    • Other Other         Malignant neoplasm   • Hypertension Other    • Alcohol abuse Father    • Arthritis Other    • Cancer Other        Social History     Socioeconomic History   • Marital status:      Spouse name: Not on file   • Number of children: Not on file   • Years of education: Not on file   • Highest education level: Not on file   Tobacco Use   • Smoking status: Former Smoker     Packs/day: 1.00     Years: 20.00     Pack years: 20.00     Types: Electronic Cigarette, Cigarettes     Quit date: 2018     Years since quittin.5   • Smokeless tobacco: Former User   • Tobacco comment: patient switched to electronic x 1 year    Substance and Sexual Activity   • Alcohol use: Not Currently     Comment: PT REPORTS DRINKING 30 BEERS DAILY & PINT OF VODKA   • Drug use: No   • Sexual activity: Defer           Objective   Physical Exam  Vitals reviewed.   Constitutional:       General: He is not in acute distress.     Appearance: Normal appearance. He is not ill-appearing, toxic-appearing or diaphoretic.   HENT:      Head: Normocephalic and atraumatic.      Right Ear: External ear normal.      Left Ear: External ear normal.      Nose: Nose normal.      Mouth/Throat:      Mouth: Mucous membranes are moist.      Pharynx: Oropharynx is clear.   Eyes:      Extraocular Movements: Extraocular movements intact.      Conjunctiva/sclera: Conjunctivae normal.      Pupils: Pupils are equal, round, and reactive to light.   Cardiovascular:      Rate and Rhythm: Normal rate and regular rhythm.      Pulses: Normal pulses.      Heart sounds: Normal heart sounds.   Pulmonary:       Effort: Pulmonary effort is normal. No respiratory distress.      Breath sounds: Normal breath sounds.   Abdominal:      General: Bowel sounds are normal. There is no distension.      Tenderness: There is no abdominal tenderness.   Musculoskeletal:         General: No swelling, tenderness or deformity. Normal range of motion.      Cervical back: Normal range of motion and neck supple.   Skin:     General: Skin is warm and dry.      Capillary Refill: Capillary refill takes less than 2 seconds.      Findings: No rash.   Neurological:      General: No focal deficit present.      Mental Status: He is alert and oriented to person, place, and time.      Comments: Normal strength and sensation, cranial nerves intact   Psychiatric:         Mood and Affect: Mood normal.         Behavior: Behavior normal.         Procedures           ED Course                                           MDM  Number of Diagnoses or Management Options  Alcohol abuse  Brain fog  Elevated liver enzymes  Seizure disorder (CMS/HCC)  Diagnosis management comments: 45-year-old male with unwell feeling after hospitalization with seizures.  Well-developed, well-nourished man in no distress with exam as above.  His vital signs are normal save for mild hypertension.  His exam is otherwise nonfocal.  Will obtain repeat labs and head CT.  Will give symptomatic treatment.  Disposition pending.    DDx: Seizure disorder, electrolyte abnormality, dehydration, medication reaction    EKG interpreted by me: Sinus rhythm, normal rate, incomplete RBBB, no acute ST/T changes this is a borderline EKG    Lab work notable for mildly elevated LFTs.CT is negative. He feels improved after treatment here. Patient's mother now notes that he has a history of alcohol abuse. His last drink was apparently right before he was admitted earlier this week. He declines wanting rehab or outpatient treatment for alcohol abuse. Will discharge home with outpatient follow-up as  planned.       Amount and/or Complexity of Data Reviewed  Decide to obtain previous medical records or to obtain history from someone other than the patient: yes        Final diagnoses:   Alcohol abuse   Brain fog   Seizure disorder (CMS/HCC)   Elevated liver enzymes          Nicolas Rodriguez MD  07/03/21 5748

## 2021-07-04 ENCOUNTER — HOSPITAL ENCOUNTER (EMERGENCY)
Facility: HOSPITAL | Age: 46
Discharge: HOME | End: 2021-07-04
Attending: EMERGENCY MEDICINE | Admitting: EMERGENCY MEDICINE

## 2021-07-04 ENCOUNTER — APPOINTMENT (OUTPATIENT)
Dept: CT IMAGING | Facility: HOSPITAL | Age: 46
End: 2021-07-04

## 2021-07-04 VITALS
HEART RATE: 100 BPM | TEMPERATURE: 97.9 F | OXYGEN SATURATION: 98 % | HEIGHT: 70 IN | RESPIRATION RATE: 17 BRPM | WEIGHT: 170 LBS | BODY MASS INDEX: 24.34 KG/M2 | DIASTOLIC BLOOD PRESSURE: 98 MMHG | SYSTOLIC BLOOD PRESSURE: 128 MMHG

## 2021-07-04 DIAGNOSIS — M54.50 CHRONIC MIDLINE LOW BACK PAIN WITHOUT SCIATICA: Primary | ICD-10-CM

## 2021-07-04 DIAGNOSIS — G89.29 CHRONIC MIDLINE LOW BACK PAIN WITHOUT SCIATICA: Primary | ICD-10-CM

## 2021-07-04 LAB
ALBUMIN SERPL-MCNC: 4.1 G/DL (ref 3.5–5.2)
ALBUMIN/GLOB SERPL: 1.4 G/DL
ALP SERPL-CCNC: 67 U/L (ref 39–117)
ALT SERPL W P-5'-P-CCNC: 59 U/L (ref 1–41)
ANION GAP SERPL CALCULATED.3IONS-SCNC: 16.5 MMOL/L (ref 5–15)
AST SERPL-CCNC: 139 U/L (ref 1–40)
BASOPHILS # BLD AUTO: 0.09 10*3/MM3 (ref 0–0.2)
BASOPHILS NFR BLD AUTO: 0.9 % (ref 0–1.5)
BILIRUB SERPL-MCNC: 0.2 MG/DL (ref 0–1.2)
BUN SERPL-MCNC: 7 MG/DL (ref 6–20)
BUN/CREAT SERPL: 6.6 (ref 7–25)
CALCIUM SPEC-SCNC: 8.9 MG/DL (ref 8.6–10.5)
CHLORIDE SERPL-SCNC: 104 MMOL/L (ref 98–107)
CO2 SERPL-SCNC: 24.5 MMOL/L (ref 22–29)
CREAT SERPL-MCNC: 1.06 MG/DL (ref 0.76–1.27)
DEPRECATED RDW RBC AUTO: 44.9 FL (ref 37–54)
EOSINOPHIL # BLD AUTO: 0.31 10*3/MM3 (ref 0–0.4)
EOSINOPHIL NFR BLD AUTO: 3.1 % (ref 0.3–6.2)
ERYTHROCYTE [DISTWIDTH] IN BLOOD BY AUTOMATED COUNT: 12.7 % (ref 12.3–15.4)
ETHANOL BLD-MCNC: 191 MG/DL (ref 0–10)
ETHANOL UR QL: 0.19 %
GFR SERPL CREATININE-BSD FRML MDRD: 76 ML/MIN/1.73
GLOBULIN UR ELPH-MCNC: 2.9 GM/DL
GLUCOSE SERPL-MCNC: 86 MG/DL (ref 65–99)
HCT VFR BLD AUTO: 44.8 % (ref 37.5–51)
HGB BLD-MCNC: 15.5 G/DL (ref 13–17.7)
IMM GRANULOCYTES # BLD AUTO: 0.02 10*3/MM3 (ref 0–0.05)
IMM GRANULOCYTES NFR BLD AUTO: 0.2 % (ref 0–0.5)
LYMPHOCYTES # BLD AUTO: 2.61 10*3/MM3 (ref 0.7–3.1)
LYMPHOCYTES NFR BLD AUTO: 25.7 % (ref 19.6–45.3)
MCH RBC QN AUTO: 33 PG (ref 26.6–33)
MCHC RBC AUTO-ENTMCNC: 34.6 G/DL (ref 31.5–35.7)
MCV RBC AUTO: 95.3 FL (ref 79–97)
MONOCYTES # BLD AUTO: 0.63 10*3/MM3 (ref 0.1–0.9)
MONOCYTES NFR BLD AUTO: 6.2 % (ref 5–12)
NEUTROPHILS NFR BLD AUTO: 6.48 10*3/MM3 (ref 1.7–7)
NEUTROPHILS NFR BLD AUTO: 63.9 % (ref 42.7–76)
NRBC BLD AUTO-RTO: 0 /100 WBC (ref 0–0.2)
PLATELET # BLD AUTO: 410 10*3/MM3 (ref 140–450)
PMV BLD AUTO: 9 FL (ref 6–12)
POTASSIUM SERPL-SCNC: 3.6 MMOL/L (ref 3.5–5.2)
PROT SERPL-MCNC: 7 G/DL (ref 6–8.5)
RBC # BLD AUTO: 4.7 10*6/MM3 (ref 4.14–5.8)
SODIUM SERPL-SCNC: 145 MMOL/L (ref 136–145)
WBC # BLD AUTO: 10.14 10*3/MM3 (ref 3.4–10.8)

## 2021-07-04 PROCEDURE — 72131 CT LUMBAR SPINE W/O DYE: CPT

## 2021-07-04 PROCEDURE — 80053 COMPREHEN METABOLIC PANEL: CPT | Performed by: PHYSICIAN ASSISTANT

## 2021-07-04 PROCEDURE — 25010000002 KETOROLAC TROMETHAMINE PER 15 MG: Performed by: PHYSICIAN ASSISTANT

## 2021-07-04 PROCEDURE — 82077 ASSAY SPEC XCP UR&BREATH IA: CPT | Performed by: PHYSICIAN ASSISTANT

## 2021-07-04 PROCEDURE — 85025 COMPLETE CBC W/AUTO DIFF WBC: CPT | Performed by: PHYSICIAN ASSISTANT

## 2021-07-04 PROCEDURE — 96374 THER/PROPH/DIAG INJ IV PUSH: CPT

## 2021-07-04 PROCEDURE — 99283 EMERGENCY DEPT VISIT LOW MDM: CPT

## 2021-07-04 PROCEDURE — 72128 CT CHEST SPINE W/O DYE: CPT

## 2021-07-04 RX ORDER — LIDOCAINE 50 MG/G
1 PATCH TOPICAL
Status: DISCONTINUED | OUTPATIENT
Start: 2021-07-05 | End: 2021-07-04

## 2021-07-04 RX ORDER — LIDOCAINE 50 MG/G
1 PATCH TOPICAL ONCE
Status: DISCONTINUED | OUTPATIENT
Start: 2021-07-04 | End: 2021-07-05 | Stop reason: HOSPADM

## 2021-07-04 RX ORDER — KETOROLAC TROMETHAMINE 30 MG/ML
30 INJECTION, SOLUTION INTRAMUSCULAR; INTRAVENOUS ONCE
Status: COMPLETED | OUTPATIENT
Start: 2021-07-04 | End: 2021-07-04

## 2021-07-04 RX ADMIN — LIDOCAINE 1 PATCH: 50 PATCH CUTANEOUS at 22:26

## 2021-07-04 RX ADMIN — KETOROLAC TROMETHAMINE 30 MG: 30 INJECTION, SOLUTION INTRAMUSCULAR; INTRAVENOUS at 20:22

## 2021-07-05 NOTE — ED PROVIDER NOTES
Subjective   Chief Complaint: Low back pain  History of Present Illness: 45-year-old male comes in for evaluation above complaint.  He was here Tuesday last week for altered mental status and seizures and transferred to [facility] and was discharged on Keppra although he states has not been taking it as prescribed.  He continues to drink alcohol.  He was here yesterday to be evaluated as his family thought he was can have more seizures.  His work-up is reassuring.  He has follow-up with his PCP on Tuesday however he comes in the day for ongoing low back pain.  He has a history of chronic low back pain but it is worse recently.  No recent injury.  No bowel bladder incontinence or saddle paresthesias.  He states he had a few beers to drink today  Onset: Chronic  Timing: Worse the past week  Exacerbating / Alleviating factors: Movement and palpation of the lower thoracic and lumbar spine makes his symptoms worse  Associated symptoms: No more seizures      Nurses Notes reviewed and agree, including vitals, allergies, social history and prior medical history.          Review of Systems   Constitutional: Negative.    HENT: Negative.    Eyes: Negative.    Respiratory: Negative.    Cardiovascular: Negative.    Gastrointestinal: Negative.    Genitourinary: Negative.    Musculoskeletal: Positive for back pain.   Skin: Negative.    Allergic/Immunologic: Negative.    Neurological: Negative.    Psychiatric/Behavioral: Negative.    All other systems reviewed and are negative.      Past Medical History:   Diagnosis Date   • Anxiety    • Arthritis    • Back pain, chronic    • Carpal tunnel syndrome    • Constipation    • Depression    • Diverticulosis    • Fibromyalgia    • Fractures    • GERD (gastroesophageal reflux disease)    • Head ache    • Hiatal hernia    • History of blood transfusion    • Hypertension     pt states no longer has it, was from anxiety attacks.    • Migraine    • Shoulder injury    • Tendonitis    •  "Tobacco abuse    • Traumatic brain injury (CMS/HCC)        Allergies   Allergen Reactions   • Shellfish-Derived Products Arrhythmia   • Sulfa Antibiotics Swelling   • Amitriptyline Other (See Comments)     Made him mean     • Buspirone Other (See Comments)     Didn't \"feel right\"  Didn't \"feel right\"   • Duloxetine Hcl Other (See Comments)     Urinary retention  Urinary retention   • Penicillins Rash   • Venlafaxine Nausea And Vomiting       Past Surgical History:   Procedure Laterality Date   • COLONOSCOPY     • EYE SURGERY Left 1999    reconstruction   • EYE SURGERY      reconstruction of left eye    • SPINAL CORD STIMULATOR IMPLANT N/A 2017    Procedure: INTRATHECAL CATHETER PLACEMENT ;  Surgeon: Aristides Zafar MD;  Location: [facility];  Service:    • TRACHEOSTOMY     • WISDOM TOOTH EXTRACTION         Family History   Problem Relation Age of Onset   • Arthritis Mother    • Other Mother         Malignant neoplasm   • Arthritis Other    • Other Other         Malignant neoplasm   • Hypertension Other    • Alcohol abuse Father    • Arthritis Other    • Cancer Other        Social History     Socioeconomic History   • Marital status:      Spouse name: Not on file   • Number of children: Not on file   • Years of education: Not on file   • Highest education level: Not on file   Tobacco Use   • Smoking status: Former Smoker     Packs/day: 1.00     Years: 20.00     Pack years: 20.00     Types: Electronic Cigarette, Cigarettes     Quit date: 2018     Years since quittin.5   • Smokeless tobacco: Former User   • Tobacco comment: patient switched to electronic x 1 year    Substance and Sexual Activity   • Alcohol use: Not Currently     Comment: PT REPORTS DRINKING 30 BEERS DAILY & PINT OF VODKA   • Drug use: No   • Sexual activity: Defer           Objective   Physical Exam  Vitals and nursing note reviewed.   Constitutional:       Appearance: Normal appearance.   HENT:      Head: Normocephalic " "and atraumatic.      Nose: Nose normal.   Eyes:      Extraocular Movements: Extraocular movements intact.   Cardiovascular:      Rate and Rhythm: Normal rate and regular rhythm.   Pulmonary:      Effort: Pulmonary effort is normal.   Abdominal:      General: Abdomen is flat.      Palpations: Abdomen is soft.      Tenderness: There is no abdominal tenderness.   Musculoskeletal:      Cervical back: Normal range of motion.        Back:       Comments: Tenderness palpation.  No step-offs or deformities.   Skin:     General: Skin is warm and dry.   Neurological:      General: No focal deficit present.      Mental Status: He is alert.      Comments: Oriented, seems slightly confused at baseline.  Family states he is at his baseline at this time   Psychiatric:         Mood and Affect: Mood normal.         Behavior: Behavior normal.         Procedures           ED Course                                           MDM  CT scan shows subtle age-indeterminate fracture of endplate of T4 although this is not in the area of the patient's pain.  He is intoxicated with alcohol tonight with alcohol level of 191.  He is in no acute distress he has good strength in his lower extremities no red flags to include bowel bladder incontinence or saddle anesthesias.  He has not been taking his Keppra.  Will instruct him to please take this as directed.  He has a follow-up with his PCP Tuesday. 2210  Discussed with patient findings of T4 endplate fracture.  He states that actually where he has pain to.  I told him I cannot in good conscience give him muscle relaxers or narcotics due to his alcoholism.  He said \"that's bullshit\" his mother expresses understanding and will be taking him home.  Final diagnoses:   Chronic midline low back pain without sciatica       ED Disposition  ED Disposition     ED Disposition Condition Comment    Discharge Stable           Jasmina Radre, APRCOY  [address] [facility] [address] [phone]    On " 7/6/2021      Shon Miller MD  [address] [place] [id number]  [phone]    Schedule an appointment as soon as possible for a visit            Medication List      No changes were made to your prescriptions during this visit.          Baltazar Jacques PA-C  07/04/21 2206       Baltazar Jacques PA-C  07/04/21 2211       Baltazar Jacques PA-C  07/04/21 2217

## 2021-07-06 ENCOUNTER — HOSPITAL ENCOUNTER (EMERGENCY)
Facility: HOSPITAL | Age: 46
Discharge: LEFT BEFORE DISCHARGE | End: 2021-07-06
Attending: HOSPITALIST
Payer: MEDICARE

## 2021-07-06 ENCOUNTER — OFFICE VISIT (OUTPATIENT)
Dept: PRIMARY CARE CLINIC | Age: 46
End: 2021-07-06
Payer: MEDICARE

## 2021-07-06 ENCOUNTER — HOSPITAL ENCOUNTER (OUTPATIENT)
Facility: HOSPITAL | Age: 46
Discharge: HOME | End: 2021-07-06
Payer: MEDICARE

## 2021-07-06 VITALS
BODY MASS INDEX: 23.62 KG/M2 | HEART RATE: 94 BPM | RESPIRATION RATE: 16 BRPM | HEIGHT: 70 IN | OXYGEN SATURATION: 96 % | SYSTOLIC BLOOD PRESSURE: 137 MMHG | TEMPERATURE: 98.1 F | WEIGHT: 165 LBS | DIASTOLIC BLOOD PRESSURE: 94 MMHG

## 2021-07-06 VITALS
SYSTOLIC BLOOD PRESSURE: 130 MMHG | TEMPERATURE: 96.9 F | HEART RATE: 107 BPM | BODY MASS INDEX: 23.48 KG/M2 | HEIGHT: 70 IN | WEIGHT: 164 LBS | DIASTOLIC BLOOD PRESSURE: 90 MMHG | OXYGEN SATURATION: 95 %

## 2021-07-06 DIAGNOSIS — M54.2 CHRONIC NECK PAIN: ICD-10-CM

## 2021-07-06 DIAGNOSIS — R79.89 ELEVATED SERUM CREATININE: ICD-10-CM

## 2021-07-06 DIAGNOSIS — R79.89 ELEVATED LFTS: ICD-10-CM

## 2021-07-06 DIAGNOSIS — I10 ESSENTIAL HYPERTENSION: ICD-10-CM

## 2021-07-06 DIAGNOSIS — R56.9 SEIZURES (HCC): ICD-10-CM

## 2021-07-06 DIAGNOSIS — F10.929 ACUTE ALCOHOLIC INTOXICATION WITH COMPLICATION (HCC): ICD-10-CM

## 2021-07-06 DIAGNOSIS — F10.10 ALCOHOL ABUSE: Primary | ICD-10-CM

## 2021-07-06 DIAGNOSIS — Z87.820 HISTORY OF TRAUMATIC BRAIN INJURY: Primary | ICD-10-CM

## 2021-07-06 DIAGNOSIS — G89.29 CHRONIC NECK PAIN: ICD-10-CM

## 2021-07-06 DIAGNOSIS — M54.9 MID BACK PAIN: ICD-10-CM

## 2021-07-06 DIAGNOSIS — R41.82 ALTERED MENTAL STATUS, UNSPECIFIED ALTERED MENTAL STATUS TYPE: ICD-10-CM

## 2021-07-06 LAB
A/G RATIO: 1.3 (ref 0.8–2)
A/G RATIO: 2 (ref 0.8–2)
ALBUMIN SERPL-MCNC: 4.3 G/DL (ref 3.4–4.8)
ALBUMIN SERPL-MCNC: 4.5 G/DL (ref 3.4–4.8)
ALP BLD-CCNC: 75 U/L (ref 25–100)
ALP BLD-CCNC: 75 U/L (ref 25–100)
ALT SERPL-CCNC: 45 U/L (ref 4–36)
ALT SERPL-CCNC: 46 U/L (ref 4–36)
ANION GAP SERPL CALCULATED.3IONS-SCNC: 12 MMOL/L (ref 3–16)
ANION GAP SERPL CALCULATED.3IONS-SCNC: 14 MMOL/L (ref 3–16)
APTT: 26.9 SEC (ref 22.5–34.9)
AST SERPL-CCNC: 56 U/L (ref 8–33)
AST SERPL-CCNC: 61 U/L (ref 8–33)
BASOPHILS ABSOLUTE: 0.1 K/UL (ref 0–0.1)
BASOPHILS RELATIVE PERCENT: 1.1 %
BILIRUB SERPL-MCNC: 0.4 MG/DL (ref 0.3–1.2)
BILIRUB SERPL-MCNC: <0.2 MG/DL (ref 0.3–1.2)
BUN BLDV-MCNC: 10 MG/DL (ref 6–20)
BUN BLDV-MCNC: 8 MG/DL (ref 6–20)
CALCIUM SERPL-MCNC: 9.3 MG/DL (ref 8.5–10.5)
CALCIUM SERPL-MCNC: 9.8 MG/DL (ref 8.5–10.5)
CHLORIDE BLD-SCNC: 101 MMOL/L (ref 98–107)
CHLORIDE BLD-SCNC: 102 MMOL/L (ref 98–107)
CO2: 24 MMOL/L (ref 20–30)
CO2: 28 MMOL/L (ref 20–30)
CREAT SERPL-MCNC: 1 MG/DL (ref 0.4–1.2)
CREAT SERPL-MCNC: 1.1 MG/DL (ref 0.4–1.2)
EOSINOPHILS ABSOLUTE: 0.4 K/UL (ref 0–0.4)
EOSINOPHILS RELATIVE PERCENT: 4.6 %
ETHANOL: 217 MG/DL (ref 0–0.08)
GFR AFRICAN AMERICAN: >59
GFR AFRICAN AMERICAN: >59
GFR NON-AFRICAN AMERICAN: >59
GFR NON-AFRICAN AMERICAN: >59
GLOBULIN: 2.3 G/DL
GLOBULIN: 3.2 G/DL
GLUCOSE BLD-MCNC: 132 MG/DL (ref 74–106)
GLUCOSE BLD-MCNC: 90 MG/DL (ref 74–106)
HCT VFR BLD CALC: 46.1 % (ref 40–54)
HEMOGLOBIN: 15.4 G/DL (ref 13–18)
IMMATURE GRANULOCYTES #: 0 K/UL
IMMATURE GRANULOCYTES %: 0.2 % (ref 0–5)
INR BLD: 0.96 (ref 0.88–1.11)
LIPASE: 422 U/L (ref 5.6–51.3)
LYMPHOCYTES ABSOLUTE: 3.2 K/UL (ref 1.5–4)
LYMPHOCYTES RELATIVE PERCENT: 36.9 %
MAGNESIUM: 2.1 MG/DL (ref 1.7–2.4)
MCH RBC QN AUTO: 32.2 PG (ref 27–32)
MCHC RBC AUTO-ENTMCNC: 33.4 G/DL (ref 31–35)
MCV RBC AUTO: 96.2 FL (ref 80–100)
MONOCYTES ABSOLUTE: 0.4 K/UL (ref 0.2–0.8)
MONOCYTES RELATIVE PERCENT: 4.8 %
NEUTROPHILS ABSOLUTE: 4.6 K/UL (ref 2–7.5)
NEUTROPHILS RELATIVE PERCENT: 52.4 %
PDW BLD-RTO: 12.8 % (ref 11–16)
PLATELET # BLD: 432 K/UL (ref 150–400)
PMV BLD AUTO: 9.1 FL (ref 6–10)
POTASSIUM REFLEX MAGNESIUM: 3 MMOL/L (ref 3.4–5.1)
POTASSIUM SERPL-SCNC: 4.1 MMOL/L (ref 3.4–5.1)
PROTHROMBIN TIME: 12.3 SEC (ref 11.6–13.8)
RBC # BLD: 4.79 M/UL (ref 4.5–6)
SODIUM BLD-SCNC: 140 MMOL/L (ref 136–145)
SODIUM BLD-SCNC: 141 MMOL/L (ref 136–145)
TOTAL PROTEIN: 7.5 G/DL (ref 6.4–8.3)
TROPONIN: <0.3 NG/ML
WBC # BLD: 8.8 K/UL (ref 4–11)

## 2021-07-06 PROCEDURE — 84165 PROTEIN E-PHORESIS SERUM: CPT

## 2021-07-06 PROCEDURE — 85730 THROMBOPLASTIN TIME PARTIAL: CPT

## 2021-07-06 PROCEDURE — 2580000003 HC RX 258: Performed by: HOSPITALIST

## 2021-07-06 PROCEDURE — 82077 ASSAY SPEC XCP UR&BREATH IA: CPT

## 2021-07-06 PROCEDURE — 83690 ASSAY OF LIPASE: CPT

## 2021-07-06 PROCEDURE — 84484 ASSAY OF TROPONIN QUANT: CPT

## 2021-07-06 PROCEDURE — 80053 COMPREHEN METABOLIC PANEL: CPT

## 2021-07-06 PROCEDURE — 36415 COLL VENOUS BLD VENIPUNCTURE: CPT

## 2021-07-06 PROCEDURE — 85025 COMPLETE CBC W/AUTO DIFF WBC: CPT

## 2021-07-06 PROCEDURE — 83735 ASSAY OF MAGNESIUM: CPT

## 2021-07-06 PROCEDURE — 99204 OFFICE O/P NEW MOD 45 MIN: CPT | Performed by: NURSE PRACTITIONER

## 2021-07-06 PROCEDURE — 85610 PROTHROMBIN TIME: CPT

## 2021-07-06 PROCEDURE — 99284 EMERGENCY DEPT VISIT MOD MDM: CPT

## 2021-07-06 PROCEDURE — 84155 ASSAY OF PROTEIN SERUM: CPT

## 2021-07-06 RX ORDER — BUSPIRONE HYDROCHLORIDE 30 MG/1
15 TABLET ORAL
COMMUNITY
Start: 2021-06-23

## 2021-07-06 RX ORDER — BUPROPION HYDROCHLORIDE 150 MG/1
150 TABLET ORAL EVERY MORNING
COMMUNITY

## 2021-07-06 RX ORDER — CHLORDIAZEPOXIDE HYDROCHLORIDE 25 MG/1
25 CAPSULE, GELATIN COATED ORAL 3 TIMES DAILY PRN
COMMUNITY

## 2021-07-06 RX ORDER — GABAPENTIN 800 MG/1
TABLET ORAL
COMMUNITY
Start: 2021-06-29

## 2021-07-06 RX ORDER — PANTOPRAZOLE SODIUM 40 MG/1
40 TABLET, DELAYED RELEASE ORAL DAILY
COMMUNITY

## 2021-07-06 RX ORDER — DICLOFENAC SODIUM 75 MG/1
TABLET, DELAYED RELEASE ORAL
COMMUNITY
Start: 2021-07-05

## 2021-07-06 RX ORDER — ONDANSETRON 4 MG/1
4 TABLET, ORALLY DISINTEGRATING ORAL EVERY 8 HOURS PRN
COMMUNITY

## 2021-07-06 RX ORDER — 0.9 % SODIUM CHLORIDE 0.9 %
1000 INTRAVENOUS SOLUTION INTRAVENOUS ONCE
Status: COMPLETED | OUTPATIENT
Start: 2021-07-06 | End: 2021-07-06

## 2021-07-06 RX ORDER — SUCRALFATE 1 G/1
1 TABLET ORAL 4 TIMES DAILY
COMMUNITY

## 2021-07-06 RX ORDER — DOXEPIN HYDROCHLORIDE 50 MG/1
CAPSULE ORAL
COMMUNITY
Start: 2021-06-23

## 2021-07-06 RX ADMIN — SODIUM CHLORIDE 1000 ML: 9 INJECTION, SOLUTION INTRAVENOUS at 15:50

## 2021-07-06 SDOH — ECONOMIC STABILITY: FOOD INSECURITY: WITHIN THE PAST 12 MONTHS, THE FOOD YOU BOUGHT JUST DIDN'T LAST AND YOU DIDN'T HAVE MONEY TO GET MORE.: PATIENT DECLINED

## 2021-07-06 SDOH — ECONOMIC STABILITY: FOOD INSECURITY: WITHIN THE PAST 12 MONTHS, YOU WORRIED THAT YOUR FOOD WOULD RUN OUT BEFORE YOU GOT MONEY TO BUY MORE.: PATIENT DECLINED

## 2021-07-06 ASSESSMENT — ENCOUNTER SYMPTOMS
EYE PAIN: 0
ABDOMINAL PAIN: 0
SORE THROAT: 0
BACK PAIN: 1
NAUSEA: 0
COUGH: 0
SHORTNESS OF BREATH: 0
VOMITING: 0

## 2021-07-06 ASSESSMENT — PAIN DESCRIPTION - PAIN TYPE: TYPE: CHRONIC PAIN

## 2021-07-06 ASSESSMENT — PAIN SCALES - GENERAL: PAINLEVEL_OUTOF10: 10

## 2021-07-06 ASSESSMENT — PATIENT HEALTH QUESTIONNAIRE - PHQ9
SUM OF ALL RESPONSES TO PHQ9 QUESTIONS 1 & 2: 6
1. LITTLE INTEREST OR PLEASURE IN DOING THINGS: 3
SUM OF ALL RESPONSES TO PHQ QUESTIONS 1-9: 6
SUM OF ALL RESPONSES TO PHQ QUESTIONS 1-9: 6
2. FEELING DOWN, DEPRESSED OR HOPELESS: 3
SUM OF ALL RESPONSES TO PHQ QUESTIONS 1-9: 6

## 2021-07-06 ASSESSMENT — PAIN DESCRIPTION - FREQUENCY: FREQUENCY: CONTINUOUS

## 2021-07-06 ASSESSMENT — SOCIAL DETERMINANTS OF HEALTH (SDOH): HOW HARD IS IT FOR YOU TO PAY FOR THE VERY BASICS LIKE FOOD, HOUSING, MEDICAL CARE, AND HEATING?: PATIENT DECLINED

## 2021-07-06 ASSESSMENT — PAIN DESCRIPTION - LOCATION: LOCATION: BACK

## 2021-07-06 ASSESSMENT — PAIN DESCRIPTION - ONSET: ONSET: ON-GOING

## 2021-07-06 NOTE — ED NOTES
Pt left AMA with mother as he refused to wait for lab results.       Kimberli Diaz RN  07/06/21 1640

## 2021-07-06 NOTE — ED TRIAGE NOTES
Pt to ED per [facility] for altered mental status, per mother pt has taken some pain medications and drank vodka and was hard to wake up. accu check per EMS was 75 dose of oral glucose given and accu check 76, recheck a few minutes later was 59 second dose of oral glucose given per EMS and accu check in ER 85.

## 2021-07-06 NOTE — ED NOTES
[facility] contacted ED to discuss Pt, whose chief complaint was Altered Mental Status,  to see if they needed a [facility] or Peer Support for Assistance. [facility] talked with RN, and ED Provider had not yet met with Pt. [facility] was told by RN, that they might need peer support, but ED Provider was going to meet with Pt first, and they would contact [facility] or peer support back when their needed our assistance.      Electronically signed by Yumi Luevano LCSW on 7/6/2021 at 4:00PM
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
## 2021-07-06 NOTE — PROGRESS NOTES
Health Maintenance Due This Visit   Colonoscopy No   Mammogram No   Annual Wellness Visit Yes   Microalbumin No   HgbA1C No   Diabetic Eye Exam No    House Bill One Due This Visit   ANTHONY No   UDS No   Contract No    Chief Complaint   Patient presents with    Establish Care     Pt here today to establish care    Have you seen any other physician or provider since your last visit yes - [facility]    Have you had any other diagnostic tests since your last visit? no    Have you changed or stopped any medications since your last visit? no       SUBJECTIVE:    Patient ID: Floyd Vargas is a 39 y.o. male. Medical History Review  Past Medical, Family, and Social History reviewed. Health Maintenance Due   Topic Date Due    COVID-19 Vaccine (1) Never done    HIV screen  Never done    DTaP/Tdap/Td vaccine (1 - Tdap) Never done    Lipid screen  Never done   [employer] Visit (AWV)  Never done       HPI:   Chief Complaint   Patient presents with   [address]   He went to the ER for seizures and was told he had a fracture in his back. The seizures have started recently. He states he has had 4-5 in the remote past. He drinks alcohol a lot, but states not everyday. He was transferred to [address] [place] [address] Drive after a seizure on 6/29. He does not remember what they told him about his testing. He has memory problems. He states he does not drive. He states he has trouble sleeping and did not sleep last night. He states he drinks alcohol to help with his pain. He has been taking BuSpar, Librium, and Gabapentin. He states she gives him the Gabapentin for seizures. He has chronic pain in his mid-back and neck. He has tried PT in the past with no improvement. He has taken Diclofenac, Gabapentin, and Tylenol OTC for his pain. He has not been on any BP meds for a long time. Patient's medications, allergies, past medical, surgical, social and family histories were reviewed and updated as appropriate.       Review of Systems   Constitutional: Negative for chills and fever. HENT: Negative for ear pain and sore throat. Eyes: Negative for pain and visual disturbance. Respiratory: Negative for cough and shortness of breath. Cardiovascular: Negative for chest pain, palpitations and leg swelling. Gastrointestinal: Negative for abdominal pain, nausea and vomiting. Genitourinary: Negative for dysuria and hematuria. Musculoskeletal: Positive for back pain and neck pain. Negative for joint swelling. Skin: Negative for rash. Neurological: Negative for dizziness and weakness. Psychiatric/Behavioral: Positive for sleep disturbance. Poor memory; depression    Reviewed and acurate. See MA note. OBJECTIVE:  BP (!) 130/90   Pulse 107   Temp 96.9 °F (36.1 °C) (Temporal)   Ht 5' 10\" (1.778 m)   Wt 164 lb (74.4 kg)   SpO2 95%   BMI 23.53 kg/m²    Physical Exam  Vitals reviewed. Constitutional:       General: He is not in acute distress. Appearance: He is well-developed. HENT:      Head: Normocephalic. Mouth/Throat:      Pharynx: No oropharyngeal exudate. Eyes:      General: Lids are normal.   Cardiovascular:      Rate and Rhythm: Normal rate and regular rhythm. Heart sounds: Normal heart sounds. Pulmonary:      Effort: Pulmonary effort is normal.      Breath sounds: Normal breath sounds. Abdominal:      General: Bowel sounds are normal. There is no distension. Palpations: Abdomen is soft. Tenderness: There is no abdominal tenderness. Musculoskeletal:      Cervical back: Neck supple. No deformity. Pain with movement present. Normal range of motion. Thoracic back: Tenderness present. Lymphadenopathy:      Cervical: No cervical adenopathy. Skin:     General: Skin is warm and dry. Neurological:      Mental Status: He is alert and oriented to person, place, and time. No results found for requested labs within last 30 days.      Microscopic Examination (no units) Date Value   08/29/2019 YES       Lab Results   Component Value Date    WBC 12.2 (H) 11/12/2020    NEUTROABS 9.0 (H) 11/12/2020    HGB 14.3 11/12/2020    HCT 43.8 11/12/2020    .2 (H) 11/12/2020     11/12/2020     Lab Results   Component Value Date    TSH 1.35 08/19/2015       Prior to Visit Medications    Medication Sig Taking? Authorizing Provider   busPIRone (BUSPAR) 30 MG tablet 15 mg  Yes Historical Provider, MD   gabapentin (NEURONTIN) 800 MG tablet TAKE 1 TABLET BY MOUTH THREE TIMES DAILY Yes Historical Provider, MD   buPROPion (WELLBUTRIN XL) 150 MG extended release tablet Take 150 mg by mouth every morning Yes Historical Provider, MD   chlordiazePOXIDE (LIBRIUM) 25 MG capsule Take 25 mg by mouth 3 times daily as needed for Anxiety. Yes Historical Provider, MD   pantoprazole (PROTONIX) 40 MG tablet Take 40 mg by mouth daily Yes Historical Provider, MD   sucralfate (CARAFATE) 1 GM tablet Take 1 g by mouth 4 times daily Yes Historical Provider, MD   ondansetron (ZOFRAN-ODT) 4 MG disintegrating tablet Take 4 mg by mouth every 8 hours as needed for Nausea or Vomiting Yes Historical Provider, MD   lurasidone (LATUDA) 40 MG TABS tablet Take 80 mg by mouth daily  Yes Historical Provider, MD   diclofenac (VOLTAREN) 75 MG EC tablet   Historical Provider, MD   doxepin (SINEQUAN) 50 MG capsule TAKE 1 TO 2 CAPSULES BY MOUTH AT BEDTIME  Patient not taking: Reported on 7/6/2021  Historical Provider, MD   gabapentin (NEURONTIN) 400 MG capsule Take 800 mg by mouth 3 times daily. Patient not taking: Reported on 7/6/2021  Historical Provider, MD       ASSESSMENT:  1. History of traumatic brain injury    2. Seizures (HCC)    3. Elevated serum creatinine    4. Mid back pain    5. Chronic neck pain    6. Essential hypertension    7.  Elevated LFTs        PLAN:    Orders Placed This Encounter   Procedures    MRI THORACIC SPINE WO CONTRAST    MRI CERVICAL SPINE WO CONTRAST    PROTEIN ELECTROPHORESIS, SERUM    COMPREHENSIVE METABOLIC PANEL    External Referral To Neurology       Return in about 2 weeks (around 7/20/2021).

## 2021-07-07 LAB
ALBUMIN SERPL-MCNC: 3.1 G/DL (ref 3.1–4.9)
ALPHA-1-GLOBULIN: 0.3 G/DL (ref 0.2–0.4)
ALPHA-2-GLOBULIN: 1.1 G/DL (ref 0.4–1.1)
BETA GLOBULIN: 1.3 G/DL (ref 0.9–1.6)
GAMMA GLOBULIN: 1 G/DL (ref 0.6–1.8)
SPE/IFE INTERPRETATION: NORMAL
TOTAL PROTEIN: 6.8 G/DL (ref 6.4–8.2)

## 2021-07-09 ENCOUNTER — HOSPITAL ENCOUNTER (EMERGENCY)
Facility: HOSPITAL | Age: 46
Discharge: HOME | End: 2021-07-09
Attending: EMERGENCY MEDICINE | Admitting: EMERGENCY MEDICINE

## 2021-07-09 VITALS
WEIGHT: 167 LBS | DIASTOLIC BLOOD PRESSURE: 96 MMHG | HEIGHT: 70 IN | OXYGEN SATURATION: 98 % | RESPIRATION RATE: 18 BRPM | BODY MASS INDEX: 23.91 KG/M2 | TEMPERATURE: 98 F | HEART RATE: 75 BPM | SYSTOLIC BLOOD PRESSURE: 130 MMHG

## 2021-07-09 DIAGNOSIS — F11.10 OPIOID ABUSE (HCC): ICD-10-CM

## 2021-07-09 DIAGNOSIS — F10.10 ALCOHOL ABUSE: Primary | ICD-10-CM

## 2021-07-09 LAB
ALBUMIN SERPL-MCNC: 3.8 G/DL (ref 3.5–5.2)
ALBUMIN/GLOB SERPL: 1.7 G/DL
ALP SERPL-CCNC: 72 U/L (ref 39–117)
ALT SERPL W P-5'-P-CCNC: 22 U/L (ref 1–41)
AMPHET+METHAMPHET UR QL: NEGATIVE
AMPHETAMINES UR QL: NEGATIVE
ANION GAP SERPL CALCULATED.3IONS-SCNC: 10.1 MMOL/L (ref 5–15)
APAP SERPL-MCNC: 12.4 MCG/ML (ref 0–30)
AST SERPL-CCNC: 27 U/L (ref 1–40)
BARBITURATES UR QL SCN: NEGATIVE
BASOPHILS # BLD AUTO: 0.05 10*3/MM3 (ref 0–0.2)
BASOPHILS NFR BLD AUTO: 0.6 % (ref 0–1.5)
BENZODIAZ UR QL SCN: NEGATIVE
BILIRUB SERPL-MCNC: 0.3 MG/DL (ref 0–1.2)
BILIRUB UR QL STRIP: NEGATIVE
BUN SERPL-MCNC: 8 MG/DL (ref 6–20)
BUN/CREAT SERPL: 8 (ref 7–25)
BUPRENORPHINE SERPL-MCNC: NEGATIVE NG/ML
CALCIUM SPEC-SCNC: 9.1 MG/DL (ref 8.6–10.5)
CANNABINOIDS SERPL QL: POSITIVE
CHLORIDE SERPL-SCNC: 98 MMOL/L (ref 98–107)
CLARITY UR: CLEAR
CO2 SERPL-SCNC: 25.9 MMOL/L (ref 22–29)
COCAINE UR QL: NEGATIVE
COLOR UR: YELLOW
CREAT SERPL-MCNC: 1 MG/DL (ref 0.76–1.27)
CRP SERPL-MCNC: 0.41 MG/DL (ref 0–0.5)
DEPRECATED RDW RBC AUTO: 43.7 FL (ref 37–54)
EOSINOPHIL # BLD AUTO: 0.22 10*3/MM3 (ref 0–0.4)
EOSINOPHIL NFR BLD AUTO: 2.8 % (ref 0.3–6.2)
ERYTHROCYTE [DISTWIDTH] IN BLOOD BY AUTOMATED COUNT: 12.5 % (ref 12.3–15.4)
ERYTHROCYTE [SEDIMENTATION RATE] IN BLOOD: 5 MM/HR (ref 0–15)
ETHANOL BLD-MCNC: <10 MG/DL (ref 0–10)
ETHANOL UR QL: <0.01 %
GFR SERPL CREATININE-BSD FRML MDRD: 82 ML/MIN/1.73
GLOBULIN UR ELPH-MCNC: 2.2 GM/DL
GLUCOSE SERPL-MCNC: 99 MG/DL (ref 65–99)
GLUCOSE UR STRIP-MCNC: NEGATIVE MG/DL
HCT VFR BLD AUTO: 39.1 % (ref 37.5–51)
HGB BLD-MCNC: 13.7 G/DL (ref 13–17.7)
HGB UR QL STRIP.AUTO: NEGATIVE
IMM GRANULOCYTES # BLD AUTO: 0.02 10*3/MM3 (ref 0–0.05)
IMM GRANULOCYTES NFR BLD AUTO: 0.3 % (ref 0–0.5)
KETONES UR QL STRIP: NEGATIVE
LEUKOCYTE ESTERASE UR QL STRIP.AUTO: NEGATIVE
LYMPHOCYTES # BLD AUTO: 2.16 10*3/MM3 (ref 0.7–3.1)
LYMPHOCYTES NFR BLD AUTO: 27.6 % (ref 19.6–45.3)
MAGNESIUM SERPL-MCNC: 1.9 MG/DL (ref 1.6–2.6)
MCH RBC QN AUTO: 33.3 PG (ref 26.6–33)
MCHC RBC AUTO-ENTMCNC: 35 G/DL (ref 31.5–35.7)
MCV RBC AUTO: 95.1 FL (ref 79–97)
METHADONE UR QL SCN: NEGATIVE
MONOCYTES # BLD AUTO: 0.38 10*3/MM3 (ref 0.1–0.9)
MONOCYTES NFR BLD AUTO: 4.8 % (ref 5–12)
NEUTROPHILS NFR BLD AUTO: 5.01 10*3/MM3 (ref 1.7–7)
NEUTROPHILS NFR BLD AUTO: 63.9 % (ref 42.7–76)
NITRITE UR QL STRIP: NEGATIVE
NRBC BLD AUTO-RTO: 0 /100 WBC (ref 0–0.2)
OPIATES UR QL: NEGATIVE
OXYCODONE UR QL SCN: POSITIVE
PCP UR QL SCN: NEGATIVE
PH UR STRIP.AUTO: 6.5 [PH] (ref 5–8)
PLATELET # BLD AUTO: 368 10*3/MM3 (ref 140–450)
PMV BLD AUTO: 9.1 FL (ref 6–12)
POTASSIUM SERPL-SCNC: 3.4 MMOL/L (ref 3.5–5.2)
PROPOXYPH UR QL: NEGATIVE
PROT SERPL-MCNC: 6 G/DL (ref 6–8.5)
PROT UR QL STRIP: NEGATIVE
RBC # BLD AUTO: 4.11 10*6/MM3 (ref 4.14–5.8)
SALICYLATES SERPL-MCNC: <0.3 MG/DL
SARS-COV-2 RNA PNL SPEC NAA+PROBE: NOT DETECTED
SODIUM SERPL-SCNC: 134 MMOL/L (ref 136–145)
SP GR UR STRIP: 1.01 (ref 1–1.03)
TRICYCLICS UR QL SCN: POSITIVE
UROBILINOGEN UR QL STRIP: NORMAL
WBC # BLD AUTO: 7.84 10*3/MM3 (ref 3.4–10.8)

## 2021-07-09 PROCEDURE — C9803 HOPD COVID-19 SPEC COLLECT: HCPCS

## 2021-07-09 PROCEDURE — 85025 COMPLETE CBC W/AUTO DIFF WBC: CPT | Performed by: PHYSICIAN ASSISTANT

## 2021-07-09 PROCEDURE — 82077 ASSAY SPEC XCP UR&BREATH IA: CPT | Performed by: PHYSICIAN ASSISTANT

## 2021-07-09 PROCEDURE — 86140 C-REACTIVE PROTEIN: CPT | Performed by: PHYSICIAN ASSISTANT

## 2021-07-09 PROCEDURE — 80143 DRUG ASSAY ACETAMINOPHEN: CPT | Performed by: PHYSICIAN ASSISTANT

## 2021-07-09 PROCEDURE — 80179 DRUG ASSAY SALICYLATE: CPT | Performed by: PHYSICIAN ASSISTANT

## 2021-07-09 PROCEDURE — 85651 RBC SED RATE NONAUTOMATED: CPT | Performed by: PHYSICIAN ASSISTANT

## 2021-07-09 PROCEDURE — 99283 EMERGENCY DEPT VISIT LOW MDM: CPT

## 2021-07-09 PROCEDURE — 80306 DRUG TEST PRSMV INSTRMNT: CPT | Performed by: PHYSICIAN ASSISTANT

## 2021-07-09 PROCEDURE — 87635 SARS-COV-2 COVID-19 AMP PRB: CPT | Performed by: PHYSICIAN ASSISTANT

## 2021-07-09 PROCEDURE — 80053 COMPREHEN METABOLIC PANEL: CPT | Performed by: PHYSICIAN ASSISTANT

## 2021-07-09 PROCEDURE — 81003 URINALYSIS AUTO W/O SCOPE: CPT | Performed by: PHYSICIAN ASSISTANT

## 2021-07-09 PROCEDURE — 83735 ASSAY OF MAGNESIUM: CPT | Performed by: PHYSICIAN ASSISTANT

## 2021-07-09 RX ORDER — CHLORDIAZEPOXIDE HYDROCHLORIDE 25 MG/1
25 CAPSULE, GELATIN COATED ORAL 3 TIMES DAILY PRN
Qty: 16 CAPSULE | Refills: 0 | Status: SHIPPED | OUTPATIENT
Start: 2021-07-09 | End: 2022-02-15
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
## 2021-07-09 NOTE — ED NOTES
Spoke with Sharyn in Behavioral Health; states she will over shortly to meet the patient.      Opal Kay RN  07/09/21 [id number]

## 2021-07-09 NOTE — ED PROVIDER NOTES
Subjective   45-year-old male that presents to the emergency department with chief complaint alcohol abuse.  Patient states he would like to have drug and alcohol detox.  Denies any homicidal or suicidal ideation. Patient states he drinks approximately 1/5 of alcohol daily.      History provided by:  Patient   used: No    Alcohol Intoxication  Location:  Alcohol abuse   Severity:  Moderate  Onset quality:  Gradual  Duration:  1 day  Timing:  Intermittent  Progression:  Worsening  Chronicity:  New  Associated symptoms: no abdominal pain, no congestion, no cough, no diarrhea, no fatigue, no fever, no headaches, no loss of consciousness, no myalgias, no rash, no rhinorrhea, no shortness of breath and no vomiting        Review of Systems   Constitutional: Negative.  Negative for fatigue and fever.   HENT: Negative for congestion and rhinorrhea.    Eyes: Negative.  Negative for photophobia, pain and discharge.   Respiratory: Negative.  Negative for apnea, cough, chest tightness and shortness of breath.    Cardiovascular: Negative.  Negative for leg swelling.   Gastrointestinal: Negative.  Negative for abdominal pain, diarrhea and vomiting.   Genitourinary: Negative.  Negative for discharge, dysuria, enuresis, flank pain, frequency, genital sores and hematuria.   Musculoskeletal: Negative.  Negative for arthralgias, gait problem and myalgias.   Skin: Negative for rash.   Neurological: Negative.  Negative for seizures, loss of consciousness, light-headedness, numbness and headaches.   Hematological: Negative.  Negative for adenopathy. Does not bruise/bleed easily.   Psychiatric/Behavioral: Negative.  Negative for behavioral problems, confusion, decreased concentration, dysphoric mood and hallucinations.   All other systems reviewed and are negative.      Past Medical History:   Diagnosis Date   • Anxiety    • Arthritis    • Back pain, chronic    • Carpal tunnel syndrome    • Constipation    • Depression     • Diverticulosis    • Fibromyalgia    • Fractures    • GERD (gastroesophageal reflux disease)    • Head ache    • Hiatal hernia    • History of blood transfusion    • Hypertension     pt states no longer has it, was from anxiety attacks.    • Migraine    • Shoulder injury    • Tendonitis    • Tobacco abuse    • Traumatic brain injury (CMS/HCC)        Allergies   Allergen Reactions   • Shellfish-Derived Products Arrhythmia   • Sulfa Antibiotics Swelling   • Amitriptyline Other (See Comments)     Made him mean     • Duloxetine Hcl Other (See Comments)     Urinary retention  Urinary retention   • Penicillins Rash   • Venlafaxine Nausea And Vomiting       Past Surgical History:   Procedure Laterality Date   • COLONOSCOPY     • EYE SURGERY Left 1999    reconstruction   • EYE SURGERY      reconstruction of left eye    • SPINAL CORD STIMULATOR IMPLANT N/A 2017    Procedure: INTRATHECAL CATHETER PLACEMENT ;  Surgeon: Aristides Zafar MD;  [facility]: [facility];  Service:    • TRACHEOSTOMY     • WISDOM TOOTH EXTRACTION         Family History   Problem Relation Age of Onset   • Arthritis Mother    • Other Mother         Malignant neoplasm   • Arthritis Other    • Other Other         Malignant neoplasm   • Hypertension Other    • Alcohol abuse Father    • Arthritis Other    • Cancer Other        Social History     Socioeconomic History   • Marital status:      Spouse name: Not on file   • Number of children: Not on file   • Years of education: Not on file   • Highest education level: Not on file   Tobacco Use   • Smoking status: Former Smoker     Packs/day: 1.00     Years: 20.00     Pack years: 20.00     Types: Electronic Cigarette, Cigarettes     Quit date: 2018     Years since quittin.6   • Smokeless tobacco: Former User   • Tobacco comment: patient switched to electronic x 1 year    Vaping Use   • Vaping Use: Every day   Substance and Sexual Activity   • Alcohol use: Yes     Comment: fifth  rosario and 1/2 case to a case of beer daily    • Drug use: Yes     Comment: oxycodone    • Sexual activity: Defer           Objective   Physical Exam  Vitals and nursing note reviewed.   Constitutional:       General: He is not in acute distress.     Appearance: Normal appearance. He is normal weight. He is not ill-appearing, toxic-appearing or diaphoretic.   HENT:      Head: Normocephalic and atraumatic.      Right Ear: Tympanic membrane, ear canal and external ear normal. There is no impacted cerumen.      Left Ear: Tympanic membrane, ear canal and external ear normal. There is no impacted cerumen.      Nose: Nose normal. No congestion or rhinorrhea.      Mouth/Throat:      Mouth: Mucous membranes are moist.      Pharynx: Oropharynx is clear. No oropharyngeal exudate or posterior oropharyngeal erythema.   Eyes:      General: No scleral icterus.        Right eye: No discharge.         Left eye: No discharge.      Extraocular Movements: Extraocular movements intact.      Conjunctiva/sclera: Conjunctivae normal.      Pupils: Pupils are equal, round, and reactive to light.   Neck:      Vascular: No carotid bruit.   Cardiovascular:      Rate and Rhythm: Normal rate and regular rhythm.      Pulses: Normal pulses.      Heart sounds: Normal heart sounds. No murmur heard.   No friction rub. No gallop.    Pulmonary:      Effort: Pulmonary effort is normal. No respiratory distress.      Breath sounds: Normal breath sounds. No stridor. No wheezing, rhonchi or rales.   Chest:      Chest wall: No tenderness.   Abdominal:      General: Abdomen is flat. Bowel sounds are normal. There is no distension.      Palpations: Abdomen is soft. There is no mass.      Tenderness: There is no abdominal tenderness. There is no right CVA tenderness, left CVA tenderness, guarding or rebound.      Hernia: No hernia is present.   Musculoskeletal:         General: No swelling, tenderness, deformity or signs of injury. Normal range of motion.       Cervical back: Normal range of motion and neck supple. No rigidity or tenderness.      Right lower leg: No edema.      Left lower leg: No edema.   Lymphadenopathy:      Cervical: No cervical adenopathy.   Skin:     General: Skin is warm and dry.      Capillary Refill: Capillary refill takes less than 2 seconds.      Coloration: Skin is not jaundiced or pale.      Findings: No bruising, erythema, lesion or rash.   Neurological:      General: No focal deficit present.      Mental Status: He is alert and oriented to person, place, and time. Mental status is at baseline.      Cranial Nerves: No cranial nerve deficit.      Sensory: No sensory deficit.      Motor: No weakness.      Coordination: Coordination normal.      Gait: Gait normal.      Deep Tendon Reflexes: Reflexes normal.   Psychiatric:         Mood and Affect: Mood normal.         Behavior: Behavior normal.         Thought Content: Thought content normal.         Judgment: Judgment normal.         Procedures           ED Course  ED Course as of Jul 11 0012 Fri Jul 09, 2021   1725 Patient did not wish to be admitted inpatient for alcohol detox.  Will be discharged.    []      ED Course User Index  [] Ha Romero PA-C                                           [facility]    Final diagnoses:   Alcohol abuse   Opioid abuse (CMS/HCC)       ED Disposition  ED Disposition     ED Disposition Condition Comment    Discharge Stable           Jasmina Rader APRN  [address] [facility] [address]  [phone]    Call in 1 day      Jasmina Rader APRN  [address] [facility] [address]  [phone]               Medication List      Changed    * chlordiazePOXIDE 25 MG capsule  Commonly known as: LIBRIUM  Take 2 capsules q6h x1 day, then 1 capsule q6h x1 day, then 1 capsule q12h x1 day, then 1 capsule at night for one day.  What changed: Another medication with the same name was added. Make sure you understand how and when to take each.     * chlordiazePOXIDE 25 MG  capsule  Commonly known as: LIBRIUM  Day 1 50 mg Q6H  Day 2 25 mg Q6H  Day 3 25 mg Q12H  Day 4 25 at night  What changed: Another medication with the same name was added. Make sure you understand how and when to take each.     * chlordiazePOXIDE 25 MG capsule  Commonly known as: LIBRIUM  Take 1 capsule by mouth 3 (Three) Times a Day As Needed for Withdrawal. 50 mg every 6 hours day 1 25 mg every 6 hours Dy 2, 25 mg bid day 3, 25 mg daily day 4/5  What changed: You were already taking a medication with the same name, and this prescription was added. Make sure you understand how and when to take each.         * This list has 3 medication(s) that are the same as other medications prescribed for you. Read the directions carefully, and ask your doctor or other care provider to review them with you.               Where to Get Your Medications      These medications were sent to [facility][id number] [address] AT [facility] [place] [phone] [facility] [phone] [address] [address]    Phone: [phone]   · chlordiazePOXIDE 25 MG capsule          Ha Romero PA-C  07/11/21 0012

## 2021-07-12 ENCOUNTER — NURSE TRIAGE (OUTPATIENT)
Dept: CALL CENTER | Facility: HOSPITAL | Age: 46
End: 2021-07-12

## 2021-07-12 NOTE — TELEPHONE ENCOUNTER
Wants to know what time [facility] comes in today as they are supposed to call today to set up an appt?     Explained unsure what time but she could check with hospital switchboard. Provided her with number.

## 2021-07-14 ENCOUNTER — HOSPITAL ENCOUNTER (EMERGENCY)
Facility: HOSPITAL | Age: 46
Discharge: LEFT BEFORE DISCHARGE | End: 2021-07-14
Attending: EMERGENCY MEDICINE | Admitting: EMERGENCY MEDICINE

## 2021-07-14 ENCOUNTER — OFFICE VISIT (OUTPATIENT)
Dept: PSYCHIATRY | Facility: CLINIC | Age: 46
End: 2021-07-14

## 2021-07-14 VITALS
HEIGHT: 70 IN | BODY MASS INDEX: 23.48 KG/M2 | RESPIRATION RATE: 20 BRPM | DIASTOLIC BLOOD PRESSURE: 92 MMHG | OXYGEN SATURATION: 98 % | WEIGHT: 164 LBS | SYSTOLIC BLOOD PRESSURE: 137 MMHG | HEART RATE: 90 BPM | TEMPERATURE: 98 F

## 2021-07-14 DIAGNOSIS — F10.20 ALCOHOL USE DISORDER, SEVERE, DEPENDENCE (HCC): Primary | ICD-10-CM

## 2021-07-14 DIAGNOSIS — F10.10 ALCOHOL ABUSE: Primary | ICD-10-CM

## 2021-07-14 DIAGNOSIS — F11.20 OPIOID USE DISORDER, SEVERE, DEPENDENCE (HCC): ICD-10-CM

## 2021-07-14 DIAGNOSIS — F19.10 POLYSUBSTANCE ABUSE (HCC): ICD-10-CM

## 2021-07-14 LAB
ALBUMIN SERPL-MCNC: 3.9 G/DL (ref 3.5–5.2)
ALBUMIN/GLOB SERPL: 1.6 G/DL
ALP SERPL-CCNC: 80 U/L (ref 39–117)
ALT SERPL W P-5'-P-CCNC: 19 U/L (ref 1–41)
AMPHET+METHAMPHET UR QL: NEGATIVE
AMPHETAMINES UR QL: NEGATIVE
ANION GAP SERPL CALCULATED.3IONS-SCNC: 8.8 MMOL/L (ref 5–15)
AST SERPL-CCNC: 19 U/L (ref 1–40)
BARBITURATES UR QL SCN: NEGATIVE
BASOPHILS # BLD AUTO: 0.06 10*3/MM3 (ref 0–0.2)
BASOPHILS NFR BLD AUTO: 0.8 % (ref 0–1.5)
BENZODIAZ UR QL SCN: POSITIVE
BILIRUB SERPL-MCNC: 0.3 MG/DL (ref 0–1.2)
BILIRUB UR QL STRIP: NEGATIVE
BUN SERPL-MCNC: 7 MG/DL (ref 6–20)
BUN/CREAT SERPL: 6.5 (ref 7–25)
BUPRENORPHINE SERPL-MCNC: NEGATIVE NG/ML
CALCIUM SPEC-SCNC: 9.4 MG/DL (ref 8.6–10.5)
CANNABINOIDS SERPL QL: POSITIVE
CHLORIDE SERPL-SCNC: 104 MMOL/L (ref 98–107)
CLARITY UR: CLEAR
CO2 SERPL-SCNC: 28.2 MMOL/L (ref 22–29)
COCAINE UR QL: NEGATIVE
COLOR UR: YELLOW
CREAT SERPL-MCNC: 1.08 MG/DL (ref 0.76–1.27)
DEPRECATED RDW RBC AUTO: 45.8 FL (ref 37–54)
EOSINOPHIL # BLD AUTO: 0.35 10*3/MM3 (ref 0–0.4)
EOSINOPHIL NFR BLD AUTO: 4.8 % (ref 0.3–6.2)
ERYTHROCYTE [DISTWIDTH] IN BLOOD BY AUTOMATED COUNT: 13 % (ref 12.3–15.4)
ETHANOL BLD-MCNC: <10 MG/DL (ref 0–10)
ETHANOL UR QL: <0.01 %
GFR SERPL CREATININE-BSD FRML MDRD: 75 ML/MIN/1.73
GLOBULIN UR ELPH-MCNC: 2.5 GM/DL
GLUCOSE SERPL-MCNC: 114 MG/DL (ref 65–99)
GLUCOSE UR STRIP-MCNC: NEGATIVE MG/DL
HCT VFR BLD AUTO: 43 % (ref 37.5–51)
HGB BLD-MCNC: 14.7 G/DL (ref 13–17.7)
HGB UR QL STRIP.AUTO: NEGATIVE
IMM GRANULOCYTES # BLD AUTO: 0.02 10*3/MM3 (ref 0–0.05)
IMM GRANULOCYTES NFR BLD AUTO: 0.3 % (ref 0–0.5)
KETONES UR QL STRIP: NEGATIVE
LEUKOCYTE ESTERASE UR QL STRIP.AUTO: NEGATIVE
LYMPHOCYTES # BLD AUTO: 2.05 10*3/MM3 (ref 0.7–3.1)
LYMPHOCYTES NFR BLD AUTO: 28.3 % (ref 19.6–45.3)
MAGNESIUM SERPL-MCNC: 2 MG/DL (ref 1.6–2.6)
MCH RBC QN AUTO: 32.9 PG (ref 26.6–33)
MCHC RBC AUTO-ENTMCNC: 34.2 G/DL (ref 31.5–35.7)
MCV RBC AUTO: 96.2 FL (ref 79–97)
METHADONE UR QL SCN: NEGATIVE
MONOCYTES # BLD AUTO: 0.34 10*3/MM3 (ref 0.1–0.9)
MONOCYTES NFR BLD AUTO: 4.7 % (ref 5–12)
NEUTROPHILS NFR BLD AUTO: 4.43 10*3/MM3 (ref 1.7–7)
NEUTROPHILS NFR BLD AUTO: 61.1 % (ref 42.7–76)
NITRITE UR QL STRIP: NEGATIVE
NRBC BLD AUTO-RTO: 0 /100 WBC (ref 0–0.2)
OPIATES UR QL: NEGATIVE
OXYCODONE UR QL SCN: POSITIVE
PCP UR QL SCN: NEGATIVE
PH UR STRIP.AUTO: 8 [PH] (ref 5–8)
PLATELET # BLD AUTO: 287 10*3/MM3 (ref 140–450)
PMV BLD AUTO: 9.3 FL (ref 6–12)
POTASSIUM SERPL-SCNC: 4.1 MMOL/L (ref 3.5–5.2)
PROPOXYPH UR QL: NEGATIVE
PROT SERPL-MCNC: 6.4 G/DL (ref 6–8.5)
PROT UR QL STRIP: NEGATIVE
RBC # BLD AUTO: 4.47 10*6/MM3 (ref 4.14–5.8)
SARS-COV-2 RNA PNL SPEC NAA+PROBE: NOT DETECTED
SODIUM SERPL-SCNC: 141 MMOL/L (ref 136–145)
SP GR UR STRIP: 1.01 (ref 1–1.03)
TRICYCLICS UR QL SCN: POSITIVE
UROBILINOGEN UR QL STRIP: NORMAL
WBC # BLD AUTO: 7.25 10*3/MM3 (ref 3.4–10.8)

## 2021-07-14 PROCEDURE — 96374 THER/PROPH/DIAG INJ IV PUSH: CPT

## 2021-07-14 PROCEDURE — 82077 ASSAY SPEC XCP UR&BREATH IA: CPT | Performed by: EMERGENCY MEDICINE

## 2021-07-14 PROCEDURE — 25010000002 ONDANSETRON PER 1 MG: Performed by: EMERGENCY MEDICINE

## 2021-07-14 PROCEDURE — 80306 DRUG TEST PRSMV INSTRMNT: CPT | Performed by: EMERGENCY MEDICINE

## 2021-07-14 PROCEDURE — 87635 SARS-COV-2 COVID-19 AMP PRB: CPT | Performed by: EMERGENCY MEDICINE

## 2021-07-14 PROCEDURE — 99282 EMERGENCY DEPT VISIT SF MDM: CPT

## 2021-07-14 PROCEDURE — 83735 ASSAY OF MAGNESIUM: CPT | Performed by: EMERGENCY MEDICINE

## 2021-07-14 PROCEDURE — 93005 ELECTROCARDIOGRAM TRACING: CPT | Performed by: EMERGENCY MEDICINE

## 2021-07-14 PROCEDURE — 80053 COMPREHEN METABOLIC PANEL: CPT | Performed by: EMERGENCY MEDICINE

## 2021-07-14 PROCEDURE — 81003 URINALYSIS AUTO W/O SCOPE: CPT | Performed by: EMERGENCY MEDICINE

## 2021-07-14 PROCEDURE — 85025 COMPLETE CBC W/AUTO DIFF WBC: CPT | Performed by: EMERGENCY MEDICINE

## 2021-07-14 RX ORDER — ONDANSETRON 2 MG/ML
4 INJECTION INTRAMUSCULAR; INTRAVENOUS ONCE
Status: COMPLETED | OUTPATIENT
Start: 2021-07-14 | End: 2021-07-14

## 2021-07-14 RX ADMIN — ONDANSETRON 4 MG: 2 INJECTION INTRAMUSCULAR; INTRAVENOUS at 15:15

## 2021-07-14 NOTE — ED PROVIDER NOTES
TRIAGE CHIEF COMPLAINT:     Nursing and triage notes reviewed    Chief Complaint   Patient presents with   • Addiction Problem      HPI: Douglas Tan is a 45 y.o. male who presents to the emergency department complaining of needing medical clearance for substance abuse.  Patient has a history of alcohol as well as narcotic abuse.  Patient is looking for detox from these substances.  Patient was sent from the outpatient program for medical clearance as they felt it would be most appropriate to have him go through detox as an inpatient.  Patient states his last drink was several days ago and his last use of oxycodone was yesterday.  Patient denies symptoms at this time.  He denies current suicidal thoughts.    REVIEW OF SYSTEMS: All other systems reviewed and are negative     PAST MEDICAL HISTORY:   Past Medical History:   Diagnosis Date   • Anxiety    • Arthritis    • Back pain, chronic    • Carpal tunnel syndrome    • Constipation    • Depression    • Diverticulosis    • Fibromyalgia    • Fractures    • GERD (gastroesophageal reflux disease)    • Head ache    • Hiatal hernia    • History of blood transfusion    • Hypertension     pt states no longer has it, was from anxiety attacks.    • Migraine    • Shoulder injury    • Tendonitis    • Tobacco abuse    • Traumatic brain injury (CMS/HCC)         FAMILY HISTORY:   Family History   Problem Relation Age of Onset   • Arthritis Mother    • Other Mother         Malignant neoplasm   • Arthritis Other    • Other Other         Malignant neoplasm   • Hypertension Other    • Alcohol abuse Father    • Arthritis Other    • Cancer Other         SOCIAL HISTORY:   Social History     Socioeconomic History   • Marital status:      Spouse name: Not on file   • Number of children: Not on file   • Years of education: Not on file   • Highest education level: Not on file   Tobacco Use   • Smoking status: Current Every Day Smoker     Packs/day: 1.00     Years: 20.00     Pack  years: 20.00     Types: Electronic Cigarette, Cigarettes   • Smokeless tobacco: Former User   Vaping Use   • Vaping Use: Every day   Substance and Sexual Activity   • Alcohol use: Yes     Comment: fifth vodka and 1/2 case to a case of beer daily    • Drug use: Yes     Comment: oxycodone    • Sexual activity: Defer        SURGICAL HISTORY:   Past Surgical History:   Procedure Laterality Date   • COLONOSCOPY  2019   • EYE SURGERY Left 02/1999    reconstruction   • EYE SURGERY      reconstruction of left eye    • SPINAL CORD STIMULATOR IMPLANT N/A 5/16/2017    Procedure: INTRATHECAL CATHETER PLACEMENT ;  Surgeon: Aristides Zafar MD;  [facility];  Service:    • TRACHEOSTOMY     • WISDOM TOOTH EXTRACTION          CURRENT MEDICATIONS:      Medication List      ASK your doctor about these medications    buPROPion  MG 24 hr tablet  Commonly known as: WELLBUTRIN XL     busPIRone 15 MG tablet  Commonly known as: BUSPAR     carvedilol 12.5 MG tablet  Commonly known as: Coreg  Take 1 tablet by mouth 2 (Two) Times a Day With Meals.     * chlordiazePOXIDE 25 MG capsule  Commonly known as: LIBRIUM  Take 2 capsules q6h x1 day, then 1 capsule q6h x1 day, then 1 capsule q12h x1 day, then 1 capsule at night for one day.     * chlordiazePOXIDE 25 MG capsule  Commonly known as: LIBRIUM  Day 1 50 mg Q6H  Day 2 25 mg Q6H  Day 3 25 mg Q12H  Day 4 25 at night     * chlordiazePOXIDE 25 MG capsule  Commonly known as: LIBRIUM  Take 1 capsule by mouth 3 (Three) Times a Day As Needed for Withdrawal. 50 mg every 6 hours day 1 25 mg every 6 hours Dy 2, 25 mg bid day 3, 25 mg daily day 4/5     gabapentin 800 MG tablet  Commonly known as: NEURONTIN     Latuda 80 MG tablet tablet  Generic drug: Lurasidone HCl     * ondansetron ODT 4 MG disintegrating tablet  Commonly known as: ZOFRAN-ODT     * ondansetron ODT 4 MG disintegrating tablet  Commonly known as: ZOFRAN-ODT  Place 1 tablet on the tongue Every 6 (Six) Hours As Needed for  Nausea or Vomiting.     pantoprazole 40 MG EC tablet  Commonly known as: PROTONIX  Take 1 tablet by mouth Daily.     sucralfate 1 g tablet  Commonly known as: CARAFATE  Take 1 tablet by mouth 4 (Four) Times a Day.         * This list has 5 medication(s) that are the same as other medications prescribed for you. Read the directions carefully, and ask your doctor or other care provider to review them with you.                 ALLERGIES: Shellfish-derived products, Sulfa antibiotics, Amitriptyline, Duloxetine hcl, Penicillins, and Venlafaxine     PHYSICAL EXAM:   VITAL SIGNS:   Vitals:    07/14/21 1209   BP: 137/95   Pulse: 87   Resp: 20   Temp: 98 °F (36.7 °C)   SpO2: 98%      CONSTITUTIONAL: Awake, oriented, appears non-toxic, normal mood  HENT: Atraumatic, normocephalic, oral mucosa pink and moist, airway patent. Nares patent without drainage. External ears normal.   EYES: Conjunctiva clear  NECK: Trachea midline, non-tender, supple   CARDIOVASCULAR: Normal heart rate, Normal rhythm, No murmurs, rubs, gallops   PULMONARY/CHEST: Clear to auscultation, no rhonchi, wheezes, or rales. Symmetrical breath sounds.  ABDOMINAL: Non-distended, soft, non-tender - no rebound or guarding.  NEUROLOGIC: Non-focal, moving all four extremities, no gross sensory or motor deficits.   EXTREMITIES: No clubbing, cyanosis, or edema   SKIN: Warm, Dry, No erythema, No rash     ED COURSE / MEDICAL DECISION MAKING:   Douglas Tan is a 45 y.o. male who presents to the emergency department for evaluation of polysubstance abuse and dependence.  Patient is nondistressed on arrival in the emergency department.  Vital signs are stable on arrival.  Physical exam is largely unremarkable.  Will obtain labs for medical clearance and have patient evaluated by our behavioral health therapist.    EKG interpreted by me reveals sinus rhythm with a rate of 76 bpm.  There is low QRS voltage in chest leads.  There is an incomplete right bundle branch  block.  This is an abnormal appearing EKG.    Laboratory testing did reveal multiple positive substances in patient's urine including THC, benzodiazepines, tricyclic in a process, and oxycodone.    Other laboratory tests were largely clinically unremarkable.  Patient was medically clear for behavioral health evaluation.    Ultimately patient felt the process was taking too long and signed out AGAINST MEDICAL ADVICE.  Given that he is not currently having any suicidal homicidal thoughts, is not intoxicated, and has good insight into his actions I do not feel that he can be placed under hold at this time.  He was allowed to sign AGAINST MEDICAL ADVICE after expressing understanding of the risks and benefits of doing so.  He was encouraged to come back at any time to continue the evaluation.    DECISION TO DISCHARGE/ADMIT: see ED care timeline     FINAL IMPRESSION:   1 --alcohol abuse  2 --substance abuse  3 --     Electronically signed by: Karey Sadler MD, 7/14/2021 12:42 Karey Vickers MD  07/14/21 1938

## 2021-07-14 NOTE — ED NOTES
Pt called out, requesting to leave AMA and that we aren't doing anything to help him. RN updated Pt on the process to get him into a Rehab facility. Pt still wanting to leave AMA. Dr. Sadler notified. IV removed. Pt signed AMA paperwork at this time.      Melania Ruff, NILTON  07/14/21 [id number]
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
## 2021-07-16 ENCOUNTER — OFFICE VISIT (OUTPATIENT)
Dept: PRIMARY CARE CLINIC | Age: 46
End: 2021-07-16
Payer: MEDICARE

## 2021-07-16 VITALS
DIASTOLIC BLOOD PRESSURE: 78 MMHG | HEART RATE: 124 BPM | BODY MASS INDEX: 24.34 KG/M2 | WEIGHT: 170 LBS | SYSTOLIC BLOOD PRESSURE: 113 MMHG | TEMPERATURE: 97.6 F | HEIGHT: 70 IN | OXYGEN SATURATION: 96 %

## 2021-07-16 DIAGNOSIS — F10.29 ALCOHOL DEPENDENCE WITH UNSPECIFIED ALCOHOL-INDUCED DISORDER (HCC): Primary | ICD-10-CM

## 2021-07-16 PROCEDURE — 99212 OFFICE O/P EST SF 10 MIN: CPT | Performed by: NURSE PRACTITIONER

## 2021-07-16 NOTE — PROGRESS NOTES
Chief Complaint   Patient presents with    Tachycardia       Have you seen any other physician or provider since your last visit no    Have you had any other diagnostic tests since your last visit? no    Have you changed or stopped any medications since your last visit? no     Patient states he has had an increased heart rate and he is concerned about it. Patient states he has been drinking a lot. He states its either take pain medication every day or drink.

## 2021-07-16 NOTE — PATIENT INSTRUCTIONS
· Keep a list of your medicines with you. List all of the prescription medicines, nonprescription medicines, supplements, natural remedies, and vitamins that you take. Tell your healthcare providers who treat you about all of the products you are taking. Your provider can provide you with a form to keep track of them. Just ask. · Follow the directions that come with your medicine, including information about food or alcohol. Make sure you know how and when to take your medicine. Do not take more or less than you are supposed to take. · Keep all medicines out of the reach of children. · Store medicines according to the directions on the label. · Monitor yourself. Learn to know how your body reacts to your new medicine and keep track of how it makes you feel before attempting (If your provider has allowed you to do so) to drive or go to work. · Seek emergency medical attention if you think you have used too much of this medicine. An overdose of any prescription medicine can be fatal. Overdose symptoms may include extreme drowsiness, muscle weakness, confusion, cold and clammy skin, pinpoint pupils, shallow breathing, slow heart rate, fainting, or coma. · Don't share prescription medicines with others, even when they seem to have the same symptoms. What may be good for you may be harmful to others. · If you are no longer taking a prescribed medication and you have pills left please take your pills out of their original containers. Mix crushed pills with an undesirable substance, such as cat litter or used coffee grounds. Put the mixture into a disposable container with a lid, such as an empty margarine tub, or into a sealable bag. Cover up or remove any of your personal information on the empty containers by covering it with black permanent marker or duct tape. Place the sealed container with the mixture, and the empty drug containers, in the trash.    · If you use a medication that is in the form of a patch, dispose of used patches by folding them in half so that the sticky sides meet, and then flushing them down a toilet. They should not be placed in the household trash where children or pets can find them. · If you have any questions, ask your provider or pharmacist for more information. · Be sure to keep all appointments for provider visits or tests. We are committed to providing you with the best care possible. In order to help us achieve these goals please remember to bring all medications, herbal products, and over the counter supplements with you to each visit. If your provider has ordered testing for you, please be sure to follow up with our office if you have not received results within 7 days after the testing took place. *If you receive a survey after visiting one of our offices, please take time to share your experience concerning your physician office visit. These surveys are confidential and no health information about you is shared. We are eager to improve for you and we are counting on your feedback to help make that happen. ips to Help You Stop Smoking       Cigarette smoking is a preventable cause of death in the [place]. If you have thought about quitting but haven't been able to, here are some reasons why you should and some ways to do it. Here's Why   Quitting smoking now can decrease your risk of getting smoking-related illnesses like:   Heart disease   Stroke   Several types of cancer, including:   Lung   Mouth   Esophagus   Larynx   Bladder   Pancreas   Kidney   Chronic lung diseases:   Bronchitis   Emphysema   Asthma   Cataracts   Macular degeneration   Thyroid conditions   Hearing loss   Erectile dysfunction   Dementia   Osteoporosis   Here's How   Once you've decided to quit smoking, set your target quit date a few weeks away.  In the time leading up to your quit day, try some of these ideas offered by the [address] [place] [address] [place] to help you successfully quit smoking. For the best results, work with your doctor. Together, you can test your lung function and compare the results to those of a nonsmoking person. The results can be given to you as your lung age. Finding out your lung age right after having the test done may help you to stop smoking. Your doctor can also discuss with you all of your options and refer you to smoking-cessation support groups. You may wish to use nicotine replacement (gum, patches, inhaler) or one of the prescription medications that have been shown to increase quit rates and prolong abstinence from smoking. But whatever you and your doctor decide on these matters, it will still be you who decides when an how to quit. Here are some techniques:   Switch Brands   Switch to a brand you find distasteful. Change to a brand that is low in tar and nicotine a couple of weeks before your target quit date. This will help change your smoking behavior. However, do not smoke more cigarettes, inhale them more often or more deeply, or place your fingertips over the holes in the filters. All of these actions will increase your nicotine intake, and the idea is to get your body used to functioning without nicotine. Cut Down the Number of Cigarettes You Smoke   Smoke only half of each cigarette. Each day, postpone the lighting of your first cigarette by one hour. Decide you'll only smoke during odd or even hours of the day. Decide beforehand how many cigarettes you'll smoke during the day. For each additional cigarette, give a dollar to your favorite izzy. Change your eating habits to help you cut down. For example, drink milk, which many people consider incompatible with smoking. End meals or snacks with something that won't lead to a cigarette. Reach for a glass of juice instead of a cigarette for a \"pick-me-up. \"   Remember: Cutting down can help you quit, but it's not a substitute for quitting.  If you're down to about seven cigarettes a day, it's time to set your target quit date, and get ready to stick to it. Don't Smoke \"Automatically\"   Smoke only those cigarettes you really want. Catch yourself before you light up a cigarette out of pure habit. Don't empty your ashtrays. This will remind you of how many cigarettes you've smoked each day, and the sight and the smell of stale cigarettes butts will be very unpleasant. Make yourself aware of each cigarette by using the opposite hand or putting cigarettes in an unfamiliar location or a different pocket to break the automatic reach. If you light up many times during the day without even thinking about it, try to look in a mirror each time you put a match to your cigarette. You may decide you don't need it. Make Smoking Inconvenient   Stop buying cigarettes by the carton. Wait until one pack is empty before you buy another. Stop carrying cigarettes with you at home or at work. Make them difficult to get to. Make Smoking Unpleasant   Smoke only under circumstances that aren't especially pleasurable for you. If you like to smoke with others, smoke alone. Turn your chair to an empty corner and focus only on the cigarette you are smoking and all its many negative effects. Collect all your cigarette butts in one large glass container as a visual reminder of the filth made by smoking. Just Before Quitting   Practice going without cigarettes. Don't think of never smoking again. Think of quitting in terms of one day at a time . Tell yourself you won't smoke today, and then don't. Clean your clothes to rid them of the cigarette smell, which can linger a long time. On the Day You Quit   Throw away all your cigarettes and matches. Hide your lighters and ashtrays. Visit the dentist and have your teeth cleaned to get rid of tobacco stains. Notice how nice they look and resolve to keep them that way.    Make a list of things you'd like to buy for yourself or someone else. Estimate the cost in terms of packs of cigarettes, and put the money aside to buy these presents. Keep very busy on the big day. Go to the movies, exercise, take long walks, or go bike riding. Remind your family and friends that this is your quit date, and ask them to help you over the rough spots of the first couple of days and weeks. Buy yourself a treat or do something special to celebrate. Telephone and Internet Support   Telephone, web-, and computer-based programs can offer you the support that you need to quit and to stay smoke-free. You can find many programs online, like the American [employer] Association's [place] from Smoking . Immediately After Quitting   Develop a clean, fresh, nonsmoking environment around yourselfat work and at home. Buy yourself flowersyou may be surprised how much you can enjoy their scent now. The first few days after you quit, spend as much free time as possible in places where smoking isn't allowed, such as [address] [place] [address], museums, theaters, department stores, and churches. Drink large quantities of water and fruit juice (but avoid sodas that contain caffeine). Try to avoid alcohol, coffee, and other beverages that you associate with cigarette smoking. Strike up conversation instead of a match for a cigarette. If you miss the sensation of having a cigarette in your hand, play with something elsea pencil, a paper clip, a marble. If you miss having something in your mouth, try toothpicks or a fake cigarette.
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
## 2021-07-17 ENCOUNTER — HOSPITAL ENCOUNTER (EMERGENCY)
Facility: HOSPITAL | Age: 46
Discharge: HOME | End: 2021-07-17
Attending: EMERGENCY MEDICINE
Payer: MEDICARE

## 2021-07-17 ENCOUNTER — APPOINTMENT (OUTPATIENT)
Dept: GENERAL RADIOLOGY | Facility: HOSPITAL | Age: 46
End: 2021-07-17
Payer: MEDICARE

## 2021-07-17 DIAGNOSIS — F10.929 ACUTE ALCOHOLIC INTOXICATION WITH COMPLICATION (HCC): ICD-10-CM

## 2021-07-17 DIAGNOSIS — F19.10 SUBSTANCE ABUSE (HCC): ICD-10-CM

## 2021-07-17 DIAGNOSIS — J69.0 ASPIRATION PNEUMONIA OF RIGHT LOWER LOBE DUE TO VOMIT (HCC): Primary | ICD-10-CM

## 2021-07-17 LAB
A/G RATIO: 1.6 (ref 0.8–2)
ALBUMIN SERPL-MCNC: 4.1 G/DL (ref 3.4–4.8)
ALP BLD-CCNC: 84 U/L (ref 25–100)
ALT SERPL-CCNC: 21 U/L (ref 4–36)
AMPHETAMINE SCREEN, URINE: ABNORMAL
AMYLASE: 52 U/L (ref 20–104)
ANION GAP SERPL CALCULATED.3IONS-SCNC: 15 MMOL/L (ref 3–16)
AST SERPL-CCNC: 22 U/L (ref 8–33)
BARBITURATE SCREEN URINE: ABNORMAL
BASOPHILS ABSOLUTE: 0.1 K/UL (ref 0–0.1)
BASOPHILS RELATIVE PERCENT: 0.5 %
BENZODIAZEPINE SCREEN, URINE: POSITIVE
BILIRUB SERPL-MCNC: <0.2 MG/DL (ref 0.3–1.2)
BILIRUBIN URINE: NEGATIVE
BLOOD, URINE: NEGATIVE
BUN BLDV-MCNC: 7 MG/DL (ref 6–20)
BUPRENORPHINE QUAL, URINE: ABNORMAL
CALCIUM SERPL-MCNC: 8.7 MG/DL (ref 8.5–10.5)
CANNABINOID SCREEN URINE: POSITIVE
CHLORIDE BLD-SCNC: 105 MMOL/L (ref 98–107)
CLARITY: CLEAR
CO2: 20 MMOL/L (ref 20–30)
COCAINE METABOLITE SCREEN URINE: ABNORMAL
COLOR: YELLOW
CREAT SERPL-MCNC: 1 MG/DL (ref 0.4–1.2)
EOSINOPHILS ABSOLUTE: 0.1 K/UL (ref 0–0.4)
EOSINOPHILS RELATIVE PERCENT: 1.1 %
ETHANOL: 371 MG/DL (ref 0–0.08)
GFR AFRICAN AMERICAN: >59
GFR NON-AFRICAN AMERICAN: >59
GLOBULIN: 2.6 G/DL
GLUCOSE BLD-MCNC: 109 MG/DL (ref 74–106)
GLUCOSE URINE: NEGATIVE MG/DL
HCT VFR BLD CALC: 46.3 % (ref 40–54)
HEMOGLOBIN: 15.3 G/DL (ref 13–18)
IMMATURE GRANULOCYTES #: 0 K/UL
IMMATURE GRANULOCYTES %: 0.3 % (ref 0–5)
KETONES, URINE: NEGATIVE MG/DL
LEUKOCYTE ESTERASE, URINE: NEGATIVE
LIPASE: 19 U/L (ref 5.6–51.3)
LYMPHOCYTES ABSOLUTE: 2.3 K/UL (ref 1.5–4)
LYMPHOCYTES RELATIVE PERCENT: 19.4 %
Lab: ABNORMAL
MCH RBC QN AUTO: 32.4 PG (ref 27–32)
MCHC RBC AUTO-ENTMCNC: 33 G/DL (ref 31–35)
MCV RBC AUTO: 98.1 FL (ref 80–100)
METHADONE SCREEN, URINE: ABNORMAL
METHAMPHETAMINE, URINE: ABNORMAL
MICROSCOPIC EXAMINATION: NORMAL
MONOCYTES ABSOLUTE: 0.4 K/UL (ref 0.2–0.8)
MONOCYTES RELATIVE PERCENT: 3.3 %
NEUTROPHILS ABSOLUTE: 9 K/UL (ref 2–7.5)
NEUTROPHILS RELATIVE PERCENT: 75.4 %
NITRITE, URINE: NEGATIVE
OPIATE SCREEN URINE: ABNORMAL
PDW BLD-RTO: 13 % (ref 11–16)
PH UA: 5.5 (ref 5–8)
PHENCYCLIDINE SCREEN URINE: ABNORMAL
PLATELET # BLD: 287 K/UL (ref 150–400)
PMV BLD AUTO: 9.2 FL (ref 6–10)
POTASSIUM REFLEX MAGNESIUM: 3.6 MMOL/L (ref 3.4–5.1)
PROPOXYPHENE SCREEN, URINE: ABNORMAL
PROTEIN UA: NEGATIVE MG/DL
RBC # BLD: 4.72 M/UL (ref 4.5–6)
SODIUM BLD-SCNC: 140 MMOL/L (ref 136–145)
SPECIFIC GRAVITY UA: 1.01 (ref 1–1.03)
TOTAL PROTEIN: 6.7 G/DL (ref 6.4–8.3)
TRICYCLIC, URINE: POSITIVE
TROPONIN: <0.3 NG/ML
UR OXYCODONE RAPID SCREEN: ABNORMAL
URINE REFLEX TO CULTURE: NORMAL
URINE TYPE: NORMAL
UROBILINOGEN, URINE: 0.2 E.U./DL
WBC # BLD: 12 K/UL (ref 4–11)

## 2021-07-17 PROCEDURE — 2500000003 HC RX 250 WO HCPCS: Performed by: EMERGENCY MEDICINE

## 2021-07-17 PROCEDURE — 83690 ASSAY OF LIPASE: CPT

## 2021-07-17 PROCEDURE — 36415 COLL VENOUS BLD VENIPUNCTURE: CPT

## 2021-07-17 PROCEDURE — 2580000003 HC RX 258: Performed by: EMERGENCY MEDICINE

## 2021-07-17 PROCEDURE — 84484 ASSAY OF TROPONIN QUANT: CPT

## 2021-07-17 PROCEDURE — 96374 THER/PROPH/DIAG INJ IV PUSH: CPT

## 2021-07-17 PROCEDURE — 6360000002 HC RX W HCPCS: Performed by: EMERGENCY MEDICINE

## 2021-07-17 PROCEDURE — 82150 ASSAY OF AMYLASE: CPT

## 2021-07-17 PROCEDURE — 2500000003 HC RX 250 WO HCPCS

## 2021-07-17 PROCEDURE — 82077 ASSAY SPEC XCP UR&BREATH IA: CPT

## 2021-07-17 PROCEDURE — 80053 COMPREHEN METABOLIC PANEL: CPT

## 2021-07-17 PROCEDURE — 99281 EMR DPT VST MAYX REQ PHY/QHP: CPT

## 2021-07-17 PROCEDURE — 80307 DRUG TEST PRSMV CHEM ANLYZR: CPT

## 2021-07-17 PROCEDURE — 85025 COMPLETE CBC W/AUTO DIFF WBC: CPT

## 2021-07-17 PROCEDURE — 81003 URINALYSIS AUTO W/O SCOPE: CPT

## 2021-07-17 PROCEDURE — 71045 X-RAY EXAM CHEST 1 VIEW: CPT

## 2021-07-17 PROCEDURE — 6360000002 HC RX W HCPCS

## 2021-07-17 RX ORDER — FLUMAZENIL 0.1 MG/ML
0.2 INJECTION, SOLUTION INTRAVENOUS ONCE
Status: DISCONTINUED | OUTPATIENT
Start: 2021-07-17 | End: 2021-07-18 | Stop reason: HOSPADM

## 2021-07-17 RX ORDER — CLINDAMYCIN HYDROCHLORIDE 150 MG/1
300 CAPSULE ORAL ONCE
Status: DISCONTINUED | OUTPATIENT
Start: 2021-07-17 | End: 2021-07-18 | Stop reason: HOSPADM

## 2021-07-17 RX ORDER — CLINDAMYCIN HYDROCHLORIDE 150 MG/1
150 CAPSULE ORAL ONCE
Status: DISCONTINUED | OUTPATIENT
Start: 2021-07-17 | End: 2021-07-17

## 2021-07-17 RX ORDER — 0.9 % SODIUM CHLORIDE 0.9 %
1000 INTRAVENOUS SOLUTION INTRAVENOUS ONCE
Status: DISCONTINUED | OUTPATIENT
Start: 2021-07-17 | End: 2021-07-18 | Stop reason: HOSPADM

## 2021-07-17 RX ORDER — CLINDAMYCIN HYDROCHLORIDE 300 MG/1
300 CAPSULE ORAL 4 TIMES DAILY
Qty: 40 CAPSULE | Refills: 0 | Status: SHIPPED | OUTPATIENT
Start: 2021-07-17 | End: 2021-07-27

## 2021-07-17 RX ORDER — ASCORBIC ACID, VITAMIN A PALMITATE, CHOLECALCIFEROL, THIAMINE HYDROCHLORIDE, RIBOFLAVIN-5 PHOSPHATE SODIUM, PYRIDOXINE HYDROCHLORIDE, NIACINAMIDE, DEXPANTHENOL, ALPHA-TOCOPHEROL ACETATE, VITAMIN K1, FOLIC ACID, BIOTIN, CYANOCOBALAMIN 200; 3300; 200; 6; 3.6; 6; 40; 15; 10; 150; 600; 60; 5 MG/10ML; [IU]/10ML; [IU]/10ML; MG/10ML; MG/10ML; MG/10ML; MG/10ML; MG/10ML; [IU]/10ML; UG/10ML; UG/10ML; UG/10ML; UG/10ML
INJECTION, SOLUTION INTRAVENOUS
Status: COMPLETED
Start: 2021-07-17 | End: 2021-07-17

## 2021-07-17 RX ORDER — FOLIC ACID 5 MG/ML
INJECTION, SOLUTION INTRAMUSCULAR; INTRAVENOUS; SUBCUTANEOUS
Status: COMPLETED
Start: 2021-07-17 | End: 2021-07-17

## 2021-07-17 RX ORDER — NALOXONE HYDROCHLORIDE 1 MG/ML
2 INJECTION INTRAMUSCULAR; INTRAVENOUS; SUBCUTANEOUS ONCE
Status: COMPLETED | OUTPATIENT
Start: 2021-07-17 | End: 2021-07-17

## 2021-07-17 RX ORDER — THIAMINE HYDROCHLORIDE 100 MG/ML
INJECTION, SOLUTION INTRAMUSCULAR; INTRAVENOUS
Status: COMPLETED
Start: 2021-07-17 | End: 2021-07-17

## 2021-07-17 RX ADMIN — NALOXONE HYDROCHLORIDE 2 MG: 1 INJECTION PARENTERAL at 22:06

## 2021-07-17 RX ADMIN — FOLIC ACID: 5 INJECTION, SOLUTION INTRAMUSCULAR; INTRAVENOUS; SUBCUTANEOUS at 22:04

## 2021-07-17 RX ADMIN — FOLIC ACID 1 MG: 5 INJECTION, SOLUTION INTRAMUSCULAR; INTRAVENOUS; SUBCUTANEOUS at 22:03

## 2021-07-17 RX ADMIN — THIAMINE HYDROCHLORIDE 200 MG: 100 INJECTION, SOLUTION INTRAMUSCULAR; INTRAVENOUS at 22:06

## 2021-07-17 RX ADMIN — ASCORBIC ACID, VITAMIN A PALMITATE, CHOLECALCIFEROL, THIAMINE HYDROCHLORIDE, RIBOFLAVIN-5 PHOSPHATE SODIUM, PYRIDOXINE HYDROCHLORIDE, NIACINAMIDE, DEXPANTHENOL, ALPHA-TOCOPHEROL ACETATE, VITAMIN K1, FOLIC ACID, BIOTIN, CYANOCOBALAMIN 10 ML: 200; 3300; 200; 6; 3.6; 6; 40; 15; 10; 150; 600; 60; 5 INJECTION, SOLUTION INTRAVENOUS at 22:06

## 2021-07-18 VITALS
SYSTOLIC BLOOD PRESSURE: 123 MMHG | TEMPERATURE: 98.3 F | DIASTOLIC BLOOD PRESSURE: 89 MMHG | OXYGEN SATURATION: 96 % | RESPIRATION RATE: 16 BRPM

## 2021-07-18 ASSESSMENT — ENCOUNTER SYMPTOMS
ANAL BLEEDING: 0
VOMITING: 1
BLOOD IN STOOL: 0
ABDOMINAL DISTENTION: 0
NAUSEA: 1
ABDOMINAL PAIN: 0
DIARRHEA: 1
SHORTNESS OF BREATH: 0
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
## 2021-08-05 ENCOUNTER — DOCUMENTATION (OUTPATIENT)
Dept: EMERGENCY DEPT | Facility: HOSPITAL | Age: 46
End: 2021-08-05

## 2021-08-27 ENCOUNTER — OFFICE VISIT (OUTPATIENT)
Dept: FAMILY MEDICINE CLINIC | Age: 46
End: 2021-08-27
Payer: COMMERCIAL

## 2021-08-27 VITALS
TEMPERATURE: 98.5 F | BODY MASS INDEX: 25.26 KG/M2 | WEIGHT: 196.8 LBS | HEART RATE: 88 BPM | DIASTOLIC BLOOD PRESSURE: 70 MMHG | RESPIRATION RATE: 16 BRPM | HEIGHT: 74 IN | SYSTOLIC BLOOD PRESSURE: 120 MMHG | OXYGEN SATURATION: 98 %

## 2021-08-27 DIAGNOSIS — E78.00 HYPERCHOLESTEREMIA: ICD-10-CM

## 2021-08-27 DIAGNOSIS — R73.09 ELEVATED GLUCOSE: Primary | ICD-10-CM

## 2021-08-27 DIAGNOSIS — R20.2 NUMBNESS AND TINGLING: ICD-10-CM

## 2021-08-27 DIAGNOSIS — E55.9 VITAMIN D DEFICIENCY: ICD-10-CM

## 2021-08-27 DIAGNOSIS — R53.83 FATIGUE, UNSPECIFIED TYPE: ICD-10-CM

## 2021-08-27 DIAGNOSIS — R20.0 NUMBNESS AND TINGLING: ICD-10-CM

## 2021-08-27 DIAGNOSIS — H81.01 MENIERE'S DISEASE OF RIGHT EAR: ICD-10-CM

## 2021-08-27 PROCEDURE — 99213 OFFICE O/P EST LOW 20 MIN: CPT | Performed by: NURSE PRACTITIONER

## 2021-08-27 ASSESSMENT — ENCOUNTER SYMPTOMS
WHEEZING: 0
DIARRHEA: 0
CONSTIPATION: 0
VOMITING: 0
SHORTNESS OF BREATH: 0
NAUSEA: 1
COUGH: 0

## 2021-08-27 NOTE — LETTER
[address] [facility] PRIMARY CARE  [address] [facility] [id number]  Dept: Berkley Song DO        12/6/2021    [address] [address]      Dear Manpreet Lira: This letter is a reminder that you may have diagnostic testing that has not been completed. It is important to your well-being that these test(s) are performed. Please call our office at Dept: [phone] for additional information on the outstanding tests or let us know if they have been completed so we may update your chart.     Sincerely,        Nohelia Hernandes DO

## 2021-08-27 NOTE — PROGRESS NOTES
Aston Dumont (:  1975) is a 39 y.o. male,Established patient, here for evaluation of the following chief complaint(s):  Dizziness (Meniere's disease  (FMLA))         ASSESSMENT/PLAN:  1. Elevated glucose  -     Comprehensive Metabolic Panel; Future  2. Fatigue, unspecified type  -     TSH without Reflex; Future  -     CBC Auto Differential; Future  3. Meniere's disease of right ear  -  Needs new [facility] paperwork, will send to office   4. Hypercholesteremia  -     Lipid Panel; Future  5. Vitamin D deficiency  -     Vitamin D 25 Hydroxy; Future  6. Numbness and tingling  -     Vitamin B12 & Folate; Future  -     EMG; Future      No follow-ups on file. Subjective   SUBJECTIVE/OBJECTIVE:  HPI  Here today for his Meniere's. It has been flaring recently. He is not feeling like he needs a steroid at this point. Need to update FMLA paperwork. Also wants to talk about his feet. He is experiencing pain when he first stands up to walk after having his feet elevated. No swelling. Feet do feel cold often and fall asleep. Numbness and tingling. Former smoker, smoked for 20 years, one PPD. Starting to notice varicose veins. No history of low back pain. Denies discoloration. Review of Systems   Constitutional: Positive for activity change (works around his balance issues). Negative for appetite change, chills, diaphoresis and fever. HENT: Negative for congestion. Respiratory: Negative for cough, shortness of breath and wheezing. Cardiovascular: Negative for chest pain and palpitations. Gastrointestinal: Positive for nausea. Negative for constipation, diarrhea and vomiting. Genitourinary: Negative for difficulty urinating. Neurological: Positive for dizziness and numbness (tingling to bilateral feet). Negative for weakness and headaches. Objective   Physical Exam  Constitutional:       Appearance: He is well-developed. HENT:      Head: Normocephalic and atraumatic.    Neck: Thyroid: No thyromegaly. Trachea: No tracheal deviation. Cardiovascular:      Rate and Rhythm: Normal rate and regular rhythm. Pulses:           Popliteal pulses are 2+ on the right side and 2+ on the left side. Heart sounds: No murmur heard. Pulmonary:      Effort: Pulmonary effort is normal.      Breath sounds: Normal breath sounds. Abdominal:      General: Bowel sounds are normal.      Palpations: Abdomen is soft. Tenderness: There is no abdominal tenderness. Musculoskeletal:         General: Normal range of motion. Right lower leg: No edema. Left lower leg: No edema. Lymphadenopathy:      Cervical: No cervical adenopathy. Skin:     General: Skin is warm and dry. Neurological:      Mental Status: He is alert and oriented to person, place, and time. Psychiatric:         Behavior: Behavior normal.            On this date 8/27/2021 I have spent 22 minutes reviewing previous notes, test results and face to face with the patient discussing the diagnosis and importance of compliance with the treatment plan as well as documenting on the day of the visit. An electronic signature was used to authenticate this note.     --Brnuo Pulido, ADAN - CNP

## 2021-09-22 ENCOUNTER — TELEPHONE (OUTPATIENT)
Dept: FAMILY MEDICINE CLINIC | Age: 46
End: 2021-09-22

## 2021-09-22 NOTE — TELEPHONE ENCOUNTER
----- Message from Fortunato Gross sent at 9/22/2021  9:33 AM EDT -----  Subject: Message to Provider    QUESTIONS  Information for Provider? Pt called in and want to know if FMLA paper was   faxed off because it is due by 8/23/21 and if someone can give this pt a   call to let the pt know it has been done .  ---------------------------------------------------------------------------  --------------  [address]  What is the best way for the office to contact you? OK to leave message on   voicemail  Preferred Call Back Phone Number? [phone]  ---------------------------------------------------------------------------  --------------  SCRIPT ANSWERS  Relationship to Patient?  Self

## 2021-12-07 ENCOUNTER — OFFICE VISIT (OUTPATIENT)
Dept: PRIMARY CARE CLINIC | Age: 46
End: 2021-12-07
Payer: COMMERCIAL

## 2021-12-07 ENCOUNTER — TELEPHONE (OUTPATIENT)
Dept: FAMILY MEDICINE CLINIC | Age: 46
End: 2021-12-07

## 2021-12-07 VITALS
BODY MASS INDEX: 25.15 KG/M2 | OXYGEN SATURATION: 97 % | DIASTOLIC BLOOD PRESSURE: 85 MMHG | SYSTOLIC BLOOD PRESSURE: 130 MMHG | HEART RATE: 72 BPM | RESPIRATION RATE: 20 BRPM | TEMPERATURE: 97.3 F | HEIGHT: 74 IN | WEIGHT: 196 LBS

## 2021-12-07 DIAGNOSIS — R11.0 NAUSEA: ICD-10-CM

## 2021-12-07 DIAGNOSIS — U07.1 COVID-19 VIRUS INFECTION: ICD-10-CM

## 2021-12-07 DIAGNOSIS — R05.9 COUGH: ICD-10-CM

## 2021-12-07 PROCEDURE — 99213 OFFICE O/P EST LOW 20 MIN: CPT | Performed by: NURSE PRACTITIONER

## 2021-12-07 PROCEDURE — 96372 THER/PROPH/DIAG INJ SC/IM: CPT | Performed by: NURSE PRACTITIONER

## 2021-12-07 RX ORDER — DOXYCYCLINE HYCLATE 100 MG
100 TABLET ORAL 2 TIMES DAILY
Qty: 20 TABLET | Refills: 0 | Status: SHIPPED
Start: 2021-12-07 | End: 2021-12-16

## 2021-12-07 RX ORDER — FAMOTIDINE 20 MG/1
20 TABLET, FILM COATED ORAL 2 TIMES DAILY
Qty: 60 TABLET | Refills: 0 | Status: SHIPPED
Start: 2021-12-07 | End: 2022-03-17

## 2021-12-07 RX ORDER — ONDANSETRON 4 MG/1
4 TABLET, ORALLY DISINTEGRATING ORAL EVERY 8 HOURS PRN
Qty: 15 TABLET | Refills: 0 | Status: SHIPPED
Start: 2021-12-07 | End: 2022-03-11 | Stop reason: SDUPTHER

## 2021-12-07 RX ORDER — BROMPHENIRAMINE MALEATE, PSEUDOEPHEDRINE HYDROCHLORIDE, AND DEXTROMETHORPHAN HYDROBROMIDE 2; 30; 10 MG/5ML; MG/5ML; MG/5ML
5 SYRUP ORAL 4 TIMES DAILY PRN
Qty: 118 ML | Refills: 0 | Status: SHIPPED
Start: 2021-12-07 | End: 2022-03-11

## 2021-12-07 RX ORDER — DEXAMETHASONE SODIUM PHOSPHATE 10 MG/ML
10 INJECTION INTRAMUSCULAR; INTRAVENOUS ONCE
Status: COMPLETED | OUTPATIENT
Start: 2021-12-07 | End: 2021-12-07

## 2021-12-07 RX ORDER — DEXAMETHASONE 4 MG/1
4 TABLET ORAL 2 TIMES DAILY WITH MEALS
Qty: 10 TABLET | Refills: 0 | Status: SHIPPED | OUTPATIENT
Start: 2021-12-07 | End: 2021-12-12

## 2021-12-07 RX ADMIN — DEXAMETHASONE SODIUM PHOSPHATE 10 MG: 10 INJECTION INTRAMUSCULAR; INTRAVENOUS at 13:39

## 2021-12-07 NOTE — TELEPHONE ENCOUNTER
Pt's wife called in stating Kathy Gutierrez tested positive for COVID, he is sick and wants to know how he can be treated. 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 I advised we do not have any immediate openings and directed them to walk in care. Gave her the information to [place] and she was receptive to taking him.

## 2021-12-07 NOTE — PROGRESS NOTES
Chief Complaint:   Positive For Covid-19, Congestion, Cough, and Shortness of Breath      History of Present Illness   Source of history provided by:  patient. Hernán Valenzuela is a 42 y.o. old male with a past medical history of:   Past Medical History:   Diagnosis Date    Anxiety 5/8/2020    H/O Meniere's disease        Pt  presents to the [facility] care with a cough/congestion/SOB and intermittent nausea for the past several days. States the cough is  productive with green/yellow sputum. No fever noted. Denies any N/V/D, abdominal pain, CP, progressive SOB, dizziness, or lethargy. Pt tested positive for Covid 3-4 days ago         ROS    Unless otherwise stated in this report or unable to obtain because of the patient's clinical or mental status as evidenced by the medical record, this patients's positive and negative responses for Review of Systems, constitutional, psych, eyes, ENT, cardiovascular, respiratory, gastrointestinal, neurological, genitourinary, musculoskeletal, integument systems and systems related to the presenting problem are either stated in the preceding or were not pertinent or were negative for the symptoms and/or complaints related to the medical problem. Past Surgical History:  has a past surgical history that includes hernia repair and knee surgery (Left). Social History:  reports that he quit smoking about 9 years ago. He has never used smokeless tobacco. He reports that he does not drink alcohol and does not use drugs. Family History: family history is not on file. Allergies: Erythromycin    Physical Exam         VS:  /85 (Site: Right Upper Arm, Position: Sitting, Cuff Size: Large Adult)   Pulse 72   Temp 97.3 °F (36.3 °C) (Temporal)   Resp 20   Ht 6' 2\" (1.88 m)   Wt 196 lb (88.9 kg)   SpO2 97%   BMI 25.16 kg/m²    Oxygen Saturation Interpretation: Normal.    Constitutional:  Alert, development consistent with age.   Ears:  External Ears: Normal bilateral pinna.             TM's & External Canals: TM's normal bilaterally without perforation. Canals without erythema or drainage. Nose:   There is no obvious septal defect. moderate redness/edema  Mouth:  Moist bucca mucosa and normal tongue. Throat: Mild posterior pharyngeal erythema without exudates or lesions. Neck:  Supple. There is no obvious adenopathy or neck tenderness. Lungs:   Breath sounds: Normal chest expansion, mild/moderate wheezing present bilaterally   Heart:  Regular rate and rhythm, normal heart sounds, without pathological murmurs, ectopy, gallops, or rubs. Skin:  Normal turgor. Warm, dry, without visible rash. Neurological:  Oriented. Motor functions intact. Lab / Imaging Results   (All laboratory and radiology results have been personally reviewed by myself)  Labs:  No results found for this visit on 12/07/21. Imaging: All Radiology results interpreted by Radiologist unless otherwise noted. No orders to display         Assessment / Plan     Impression(s):  1. COVID-19 virus infection    2. Nausea    3. Cough      Disposition:  Disposition: Decadron injection now, Decadron tabs, Doxycycline, Pepcid, Zofran and Bromfed as ordered, continue Vitamin D, Vitamin C and Zinc, stay well hydrated     ER if changes or worse. Advised to take all medications as directed.

## 2021-12-16 ENCOUNTER — OFFICE VISIT (OUTPATIENT)
Dept: FAMILY MEDICINE CLINIC | Age: 46
End: 2021-12-16
Payer: COMMERCIAL

## 2021-12-16 VITALS
BODY MASS INDEX: 25.16 KG/M2 | DIASTOLIC BLOOD PRESSURE: 70 MMHG | OXYGEN SATURATION: 99 % | HEIGHT: 74 IN | TEMPERATURE: 96.5 F | HEART RATE: 100 BPM | SYSTOLIC BLOOD PRESSURE: 124 MMHG | RESPIRATION RATE: 16 BRPM

## 2021-12-16 DIAGNOSIS — R06.02 SOB (SHORTNESS OF BREATH): ICD-10-CM

## 2021-12-16 DIAGNOSIS — H81.01 MENIERE'S DISEASE OF RIGHT EAR: Primary | ICD-10-CM

## 2021-12-16 PROBLEM — J40 BRONCHITIS WITH WHEEZING: Status: ACTIVE | Noted: 2021-12-16

## 2021-12-16 PROCEDURE — 99213 OFFICE O/P EST LOW 20 MIN: CPT | Performed by: NURSE PRACTITIONER

## 2021-12-16 RX ORDER — PREDNISONE 20 MG/1
20 TABLET ORAL 2 TIMES DAILY
Qty: 10 TABLET | Refills: 0 | Status: SHIPPED
Start: 2021-12-16 | End: 2022-04-19 | Stop reason: SDUPTHER

## 2021-12-16 RX ORDER — ALBUTEROL SULFATE 90 UG/1
2 AEROSOL, METERED RESPIRATORY (INHALATION) 4 TIMES DAILY PRN
Qty: 1 EACH | Refills: 1 | Status: SHIPPED | OUTPATIENT
Start: 2021-12-16

## 2021-12-16 NOTE — PROGRESS NOTES
Chief Complaint   Otalgia (since positive covid test he has been getting dizziness and vertigo, pressure in both ears, hurts to the touch. ) and Positive For Covid-19 (tested positive 12/2/21 - health dept told him he may break quarantine 12/14/21. Still SOB and tired. was on steroids and antibiotics from walk in care. family members are positive for covid. )      History of Present Illness   Source of history provided by:  patient. Bassam Sanford is a 42 y.o. old male who presents to the office with complaints of Chills, Generalized Body Aches, Headache, Pharyngitis, Rhinorrhea, Nasal Congestion, ear pain Cough, Anosmia/Ageusia, Wheezing, Shortness of breath, Fatigue and x 14 days. States symptoms have improved since onset. Has been taking ATX, steroids for the symptoms with some relief. Denies any Fever, Nausea, Vomiting, Chest Pain or LE Edema. Denies any hx of no history of pneumonia or bronchitis. No tobacco use. Tested positive on 12/4 by PCR at [place]. Finished Doxycycline. Finished steroid but as soon as he finished his Meniere's flared. ROS   Pertinent positives and negatives are stated within HPI, all other systems reviewed and are negative. Past Medical History:  has a past medical history of Anxiety and H/O Meniere's disease. Past Surgical History:  has a past surgical history that includes hernia repair and knee surgery (Left). Social History:  reports that he quit smoking about 9 years ago. He has never used smokeless tobacco. He reports that he does not drink alcohol and does not use drugs. Family History: family history is not on file. Allergies: Erythromycin    Physical Exam   Vital Signs:  /70   Pulse 100   Temp 96.5 °F (35.8 °C) (Temporal)   Resp 16   Ht 6' 2\" (1.88 m)   SpO2 99%   BMI 25.16 kg/m²    Oxygen Saturation Interpretation: Normal.    Constitutional:  Alert, development consistent with age. NAD. Head:  NC/NT. Airway patent.    Ears: left TM with good light reflex, right TM with dull light reflex. Canals without exudate or swelling bilaterally. Mouth: Posterior pharynx with mild erythema and clear postnasal drip. No tonsillar hypertrophy or exudate. Neck:  Normal ROM. Supple. No anterior cervical adenopathy noted. Lungs: CTAB without wheezes, rales, or rhonchi. CV:  Regular rate and rhythm, normal heart sounds, without pathological murmurs, ectopy, gallops, or rubs. Skin:  Normal turgor. Warm, dry, without visible rash. Neurological:  Oriented. Motor functions intact. Lab / Imaging Results   (All laboratory and radiology results have been personally reviewed by myself)  Labs:  No results found for this visit on 12/16/21. Imaging: All Radiology results interpreted by Radiologist unless otherwise noted. No results found. Medical Decision Making   Pt non-toxic, in no apparent distress and stable at time of discharge. Assessment/Plan   Cris Alan was seen today for otalgia and positive for covid-19. Diagnoses and all orders for this visit:    Meniere's disease of right ear  -     predniSONE (DELTASONE) 20 MG tablet; Take 1 tablet by mouth 2 times daily for 5 days  - Reviewed side effects of medication and patient verbalizes understanding.   - Will complete paperwork for him to be off work  - Can RTW 12/27    SOB (shortness of breath)  -     albuterol sulfate HFA (VENTOLIN HFA) 108 (90 Base) MCG/ACT inhaler; Inhale 2 puffs into the lungs 4 times daily as needed for Wheezing or Shortness of Breath  -     predniSONE (DELTASONE) 20 MG tablet; Take 1 tablet by mouth 2 times daily for 5 days  - Reviewed side effects of medication and patient verbalizes understanding.   - Advised to call back directly if there are further questions, or if these symptoms fail to improve as anticipated or worsen. - May have work excuse, back on 12/27      COVID-19 swab obtained and pending, will call with results once available.  Advised self-quarantine at home in the interim. Increase fluids and rest. Symptomatic relief discussed including Tylenol prn pain/fever. Schedule f/u in 7-10 days if symptoms persist. ED sooner if symptoms worsen or change. ED immediately with high or refractory fever, progressive SOB, dyspnea, CP, calf pain/swelling, shaking chills, vomiting, abdominal pain, lethargy, flank pain, or decreased urinary output. Pt verbalizes understanding and is in agreement with plan of care. All questions answered. Svetlana Jaramillo, APRN - CNP    This visit was provided as a focused evaluation during the COVID -19 pandemic/national emergency. A comprehensive review of all previous patient history and testing was not conducted. Pertinent findings were elicited during the visit. *NOTE: This report was transcribed using voice recognition software. Every effort was made to ensure accuracy; however, inadvertent computerized transcription errors may be present.

## 2022-02-11 ENCOUNTER — HOSPITAL ENCOUNTER (EMERGENCY)
Facility: HOSPITAL | Age: 47
Discharge: LEFT BEFORE DISCHARGE | End: 2022-02-11

## 2022-02-14 ENCOUNTER — HOSPITAL ENCOUNTER (EMERGENCY)
Facility: HOSPITAL | Age: 47
Discharge: PSYCHIATRIC OR SUBSTANCE USE FACILITY | End: 2022-02-15
Attending: STUDENT IN AN ORGANIZED HEALTH CARE EDUCATION/TRAINING PROGRAM | Admitting: EMERGENCY MEDICINE

## 2022-02-14 DIAGNOSIS — F10.10 ALCOHOL ABUSE: Primary | ICD-10-CM

## 2022-02-14 LAB
ALBUMIN SERPL-MCNC: 4.74 G/DL (ref 3.5–5.2)
ALBUMIN/GLOB SERPL: 1.8 G/DL
ALP SERPL-CCNC: 243 U/L (ref 39–117)
ALT SERPL W P-5'-P-CCNC: 345 U/L (ref 1–41)
AMPHET+METHAMPHET UR QL: NEGATIVE
AMPHETAMINES UR QL: NEGATIVE
ANION GAP SERPL CALCULATED.3IONS-SCNC: 18.8 MMOL/L (ref 5–15)
AST SERPL-CCNC: 653 U/L (ref 1–40)
BACTERIA UR QL AUTO: ABNORMAL /HPF
BARBITURATES UR QL SCN: NEGATIVE
BASOPHILS # BLD AUTO: 0.04 10*3/MM3 (ref 0–0.2)
BASOPHILS NFR BLD AUTO: 0.9 % (ref 0–1.5)
BENZODIAZ UR QL SCN: NEGATIVE
BILIRUB SERPL-MCNC: 1.1 MG/DL (ref 0–1.2)
BILIRUB UR QL STRIP: ABNORMAL
BUN SERPL-MCNC: 7 MG/DL (ref 6–20)
BUN/CREAT SERPL: 7.9 (ref 7–25)
BUPRENORPHINE SERPL-MCNC: POSITIVE NG/ML
CALCIUM SPEC-SCNC: 9.3 MG/DL (ref 8.6–10.5)
CANNABINOIDS SERPL QL: POSITIVE
CHLORIDE SERPL-SCNC: 94 MMOL/L (ref 98–107)
CLARITY UR: CLEAR
CO2 SERPL-SCNC: 23.2 MMOL/L (ref 22–29)
COCAINE UR QL: NEGATIVE
COLOR UR: ABNORMAL
CREAT SERPL-MCNC: 0.89 MG/DL (ref 0.76–1.27)
DEPRECATED RDW RBC AUTO: 54.3 FL (ref 37–54)
EOSINOPHIL # BLD AUTO: 0.04 10*3/MM3 (ref 0–0.4)
EOSINOPHIL NFR BLD AUTO: 0.9 % (ref 0.3–6.2)
ERYTHROCYTE [DISTWIDTH] IN BLOOD BY AUTOMATED COUNT: 15.9 % (ref 12.3–15.4)
ETHANOL BLD-MCNC: 124 MG/DL (ref 0–10)
ETHANOL BLD-MCNC: 302 MG/DL (ref 0–10)
ETHANOL BLD-MCNC: 61 MG/DL (ref 0–10)
ETHANOL UR QL: 0.06 %
ETHANOL UR QL: 0.12 %
ETHANOL UR QL: 0.3 %
FLUAV SUBTYP SPEC NAA+PROBE: NOT DETECTED
FLUBV RNA ISLT QL NAA+PROBE: NOT DETECTED
GFR SERPL CREATININE-BSD FRML MDRD: 93 ML/MIN/1.73
GLOBULIN UR ELPH-MCNC: 2.7 GM/DL
GLUCOSE SERPL-MCNC: 137 MG/DL (ref 65–99)
GLUCOSE UR STRIP-MCNC: NEGATIVE MG/DL
GRAN CASTS URNS QL MICRO: ABNORMAL /LPF
HCT VFR BLD AUTO: 43.8 % (ref 37.5–51)
HGB BLD-MCNC: 14.9 G/DL (ref 13–17.7)
HGB UR QL STRIP.AUTO: ABNORMAL
HOLD SPECIMEN: NORMAL
HOLD SPECIMEN: NORMAL
HYALINE CASTS UR QL AUTO: ABNORMAL /LPF
IMM GRANULOCYTES # BLD AUTO: 0.01 10*3/MM3 (ref 0–0.05)
IMM GRANULOCYTES NFR BLD AUTO: 0.2 % (ref 0–0.5)
KETONES UR QL STRIP: ABNORMAL
LEUKOCYTE ESTERASE UR QL STRIP.AUTO: ABNORMAL
LYMPHOCYTES # BLD AUTO: 1.57 10*3/MM3 (ref 0.7–3.1)
LYMPHOCYTES NFR BLD AUTO: 35.3 % (ref 19.6–45.3)
MAGNESIUM SERPL-MCNC: 2.1 MG/DL (ref 1.6–2.6)
MCH RBC QN AUTO: 31.7 PG (ref 26.6–33)
MCHC RBC AUTO-ENTMCNC: 34 G/DL (ref 31.5–35.7)
MCV RBC AUTO: 93.2 FL (ref 79–97)
METHADONE UR QL SCN: NEGATIVE
MONOCYTES # BLD AUTO: 0.24 10*3/MM3 (ref 0.1–0.9)
MONOCYTES NFR BLD AUTO: 5.4 % (ref 5–12)
NEUTROPHILS NFR BLD AUTO: 2.55 10*3/MM3 (ref 1.7–7)
NEUTROPHILS NFR BLD AUTO: 57.3 % (ref 42.7–76)
NITRITE UR QL STRIP: NEGATIVE
NRBC BLD AUTO-RTO: 0 /100 WBC (ref 0–0.2)
OPIATES UR QL: NEGATIVE
OXYCODONE UR QL SCN: NEGATIVE
PCP UR QL SCN: NEGATIVE
PH UR STRIP.AUTO: 5.5 [PH] (ref 5–8)
PLATELET # BLD AUTO: 223 10*3/MM3 (ref 140–450)
PMV BLD AUTO: 8.9 FL (ref 6–12)
POTASSIUM SERPL-SCNC: 3.8 MMOL/L (ref 3.5–5.2)
PROPOXYPH UR QL: NEGATIVE
PROT SERPL-MCNC: 7.4 G/DL (ref 6–8.5)
PROT UR QL STRIP: ABNORMAL
RBC # BLD AUTO: 4.7 10*6/MM3 (ref 4.14–5.8)
RBC # UR STRIP: ABNORMAL /HPF
REF LAB TEST METHOD: ABNORMAL
SARS-COV-2 RNA PNL SPEC NAA+PROBE: NOT DETECTED
SODIUM SERPL-SCNC: 136 MMOL/L (ref 136–145)
SP GR UR STRIP: 1.02 (ref 1–1.03)
SQUAMOUS #/AREA URNS HPF: ABNORMAL /HPF
TRICYCLICS UR QL SCN: POSITIVE
UROBILINOGEN UR QL STRIP: ABNORMAL
WBC # UR STRIP: ABNORMAL /HPF
WBC NRBC COR # BLD: 4.45 10*3/MM3 (ref 3.4–10.8)
WHOLE BLOOD HOLD SPECIMEN: NORMAL
WHOLE BLOOD HOLD SPECIMEN: NORMAL

## 2022-02-14 PROCEDURE — 82077 ASSAY SPEC XCP UR&BREATH IA: CPT | Performed by: PHYSICIAN ASSISTANT

## 2022-02-14 PROCEDURE — 80053 COMPREHEN METABOLIC PANEL: CPT | Performed by: PHYSICIAN ASSISTANT

## 2022-02-14 PROCEDURE — 96375 TX/PRO/DX INJ NEW DRUG ADDON: CPT

## 2022-02-14 PROCEDURE — 83735 ASSAY OF MAGNESIUM: CPT | Performed by: PHYSICIAN ASSISTANT

## 2022-02-14 PROCEDURE — 25010000002 LORAZEPAM PER 2 MG: Performed by: STUDENT IN AN ORGANIZED HEALTH CARE EDUCATION/TRAINING PROGRAM

## 2022-02-14 PROCEDURE — 82077 ASSAY SPEC XCP UR&BREATH IA: CPT | Performed by: EMERGENCY MEDICINE

## 2022-02-14 PROCEDURE — 96361 HYDRATE IV INFUSION ADD-ON: CPT

## 2022-02-14 PROCEDURE — 36415 COLL VENOUS BLD VENIPUNCTURE: CPT

## 2022-02-14 PROCEDURE — 99285 EMERGENCY DEPT VISIT HI MDM: CPT

## 2022-02-14 PROCEDURE — 25010000002 THIAMINE PER 100 MG: Performed by: PHYSICIAN ASSISTANT

## 2022-02-14 PROCEDURE — 80306 DRUG TEST PRSMV INSTRMNT: CPT | Performed by: PHYSICIAN ASSISTANT

## 2022-02-14 PROCEDURE — 96365 THER/PROPH/DIAG IV INF INIT: CPT

## 2022-02-14 PROCEDURE — 85025 COMPLETE CBC W/AUTO DIFF WBC: CPT | Performed by: PHYSICIAN ASSISTANT

## 2022-02-14 PROCEDURE — 96374 THER/PROPH/DIAG INJ IV PUSH: CPT

## 2022-02-14 PROCEDURE — 87636 SARSCOV2 & INF A&B AMP PRB: CPT | Performed by: PHYSICIAN ASSISTANT

## 2022-02-14 PROCEDURE — 81001 URINALYSIS AUTO W/SCOPE: CPT | Performed by: PHYSICIAN ASSISTANT

## 2022-02-14 PROCEDURE — 25010000002 ONDANSETRON PER 1 MG: Performed by: PHYSICIAN ASSISTANT

## 2022-02-14 PROCEDURE — 99283 EMERGENCY DEPT VISIT LOW MDM: CPT

## 2022-02-14 RX ORDER — SODIUM CHLORIDE 0.9 % (FLUSH) 0.9 %
10 SYRINGE (ML) INJECTION AS NEEDED
Status: DISCONTINUED | OUTPATIENT
Start: 2022-02-14 | End: 2022-02-15 | Stop reason: HOSPADM

## 2022-02-14 RX ORDER — ONDANSETRON 2 MG/ML
4 INJECTION INTRAMUSCULAR; INTRAVENOUS ONCE
Status: COMPLETED | OUTPATIENT
Start: 2022-02-14 | End: 2022-02-14

## 2022-02-14 RX ORDER — OMEPRAZOLE 40 MG/1
40 CAPSULE, DELAYED RELEASE ORAL DAILY
COMMUNITY
End: 2022-02-21 | Stop reason: HOSPADM

## 2022-02-14 RX ORDER — DOXEPIN HYDROCHLORIDE 50 MG/1
100 CAPSULE ORAL NIGHTLY
COMMUNITY

## 2022-02-14 RX ORDER — BUPRENORPHINE HYDROCHLORIDE AND NALOXONE HYDROCHLORIDE DIHYDRATE 8; 2 MG/1; MG/1
1 TABLET SUBLINGUAL 2 TIMES DAILY
COMMUNITY

## 2022-02-14 RX ORDER — DOCUSATE SODIUM 100 MG/1
100 CAPSULE, LIQUID FILLED ORAL NIGHTLY PRN
COMMUNITY

## 2022-02-14 RX ORDER — POTASSIUM CHLORIDE 20MEQ/15ML
20 LIQUID (ML) ORAL DAILY
Status: ON HOLD | COMMUNITY
End: 2022-02-15

## 2022-02-14 RX ORDER — LORAZEPAM 2 MG/ML
1 INJECTION INTRAMUSCULAR ONCE
Status: COMPLETED | OUTPATIENT
Start: 2022-02-14 | End: 2022-02-14

## 2022-02-14 RX ORDER — TAMSULOSIN HYDROCHLORIDE 0.4 MG/1
1 CAPSULE ORAL NIGHTLY
COMMUNITY

## 2022-02-14 RX ORDER — CLONIDINE HYDROCHLORIDE 0.1 MG/1
0.1 TABLET ORAL NIGHTLY
COMMUNITY

## 2022-02-14 RX ADMIN — THIAMINE HYDROCHLORIDE 1000 ML/HR: 100 INJECTION, SOLUTION INTRAMUSCULAR; INTRAVENOUS at 14:56

## 2022-02-14 RX ADMIN — ONDANSETRON 4 MG: 2 INJECTION INTRAMUSCULAR; INTRAVENOUS at 15:19

## 2022-02-14 RX ADMIN — LORAZEPAM 1 MG: 2 INJECTION INTRAMUSCULAR; INTRAVENOUS at 19:58

## 2022-02-15 ENCOUNTER — HOSPITAL ENCOUNTER (INPATIENT)
Facility: HOSPITAL | Age: 47
LOS: 6 days | Discharge: HOME | End: 2022-02-21
Attending: PSYCHIATRY & NEUROLOGY | Admitting: PSYCHIATRY & NEUROLOGY

## 2022-02-15 VITALS
OXYGEN SATURATION: 93 % | TEMPERATURE: 98.5 F | HEART RATE: 113 BPM | WEIGHT: 178 LBS | RESPIRATION RATE: 18 BRPM | BODY MASS INDEX: 25.48 KG/M2 | HEIGHT: 70 IN | SYSTOLIC BLOOD PRESSURE: 134 MMHG | DIASTOLIC BLOOD PRESSURE: 89 MMHG

## 2022-02-15 DIAGNOSIS — F10.20 ALCOHOL USE DISORDER, SEVERE, DEPENDENCE: Primary | ICD-10-CM

## 2022-02-15 PROBLEM — F11.20 OPIOID USE DISORDER, SEVERE, DEPENDENCE (HCC): Status: ACTIVE | Noted: 2022-02-15

## 2022-02-15 PROBLEM — F33.2 MDD (MAJOR DEPRESSIVE DISORDER), RECURRENT SEVERE, WITHOUT PSYCHOSIS (HCC): Status: ACTIVE | Noted: 2022-02-15

## 2022-02-15 PROBLEM — F12.20 TETRAHYDROCANNABINOL (THC) USE DISORDER, MODERATE, DEPENDENCE (HCC): Status: ACTIVE | Noted: 2022-02-15

## 2022-02-15 PROBLEM — F11.10: Status: ACTIVE | Noted: 2022-02-15

## 2022-02-15 LAB
QT INTERVAL: 356 MS
QTC INTERVAL: 470 MS

## 2022-02-15 PROCEDURE — 99223 1ST HOSP IP/OBS HIGH 75: CPT | Performed by: PSYCHIATRY & NEUROLOGY

## 2022-02-15 PROCEDURE — 93005 ELECTROCARDIOGRAM TRACING: CPT | Performed by: PSYCHIATRY & NEUROLOGY

## 2022-02-15 PROCEDURE — HZ2ZZZZ DETOXIFICATION SERVICES FOR SUBSTANCE ABUSE TREATMENT: ICD-10-PCS | Performed by: PSYCHIATRY & NEUROLOGY

## 2022-02-15 PROCEDURE — 63710000001 ONDANSETRON PER 8 MG: Performed by: PSYCHIATRY & NEUROLOGY

## 2022-02-15 PROCEDURE — 96376 TX/PRO/DX INJ SAME DRUG ADON: CPT

## 2022-02-15 PROCEDURE — 25010000002 LORAZEPAM PER 2 MG: Performed by: EMERGENCY MEDICINE

## 2022-02-15 RX ORDER — BUSPIRONE HYDROCHLORIDE 10 MG/1
30 TABLET ORAL DAILY
Status: DISCONTINUED | OUTPATIENT
Start: 2022-02-15 | End: 2022-02-21 | Stop reason: HOSPADM

## 2022-02-15 RX ORDER — LORAZEPAM 1 MG/1
1 TABLET ORAL
Status: COMPLETED | OUTPATIENT
Start: 2022-02-18 | End: 2022-02-18

## 2022-02-15 RX ORDER — LORAZEPAM 1 MG/1
1 TABLET ORAL EVERY 4 HOURS PRN
Status: DISCONTINUED | OUTPATIENT
Start: 2022-02-18 | End: 2022-02-18

## 2022-02-15 RX ORDER — BUSPIRONE HYDROCHLORIDE 15 MG/1
30 TABLET ORAL NIGHTLY
COMMUNITY

## 2022-02-15 RX ORDER — LOPERAMIDE HYDROCHLORIDE 2 MG/1
2 CAPSULE ORAL
Status: DISCONTINUED | OUTPATIENT
Start: 2022-02-15 | End: 2022-02-21 | Stop reason: HOSPADM

## 2022-02-15 RX ORDER — BENZTROPINE MESYLATE 1 MG/1
2 TABLET ORAL ONCE AS NEEDED
Status: DISCONTINUED | OUTPATIENT
Start: 2022-02-15 | End: 2022-02-21 | Stop reason: HOSPADM

## 2022-02-15 RX ORDER — DOCUSATE SODIUM 100 MG/1
100 CAPSULE, LIQUID FILLED ORAL NIGHTLY PRN
Status: DISCONTINUED | OUTPATIENT
Start: 2022-02-15 | End: 2022-02-21 | Stop reason: HOSPADM

## 2022-02-15 RX ORDER — TAMSULOSIN HYDROCHLORIDE 0.4 MG/1
0.4 CAPSULE ORAL NIGHTLY
Status: DISCONTINUED | OUTPATIENT
Start: 2022-02-15 | End: 2022-02-21 | Stop reason: HOSPADM

## 2022-02-15 RX ORDER — BUSPIRONE HYDROCHLORIDE 10 MG/1
30 TABLET ORAL NIGHTLY
Status: DISCONTINUED | OUTPATIENT
Start: 2022-02-15 | End: 2022-02-21 | Stop reason: HOSPADM

## 2022-02-15 RX ORDER — FAMOTIDINE 20 MG/1
20 TABLET, FILM COATED ORAL 2 TIMES DAILY PRN
Status: DISCONTINUED | OUTPATIENT
Start: 2022-02-15 | End: 2022-02-21 | Stop reason: HOSPADM

## 2022-02-15 RX ORDER — MULTIVITAMIN WITH IRON
2 TABLET ORAL DAILY
Status: DISCONTINUED | OUTPATIENT
Start: 2022-02-15 | End: 2022-02-21 | Stop reason: HOSPADM

## 2022-02-15 RX ORDER — LORAZEPAM 0.5 MG/1
0.5 TABLET ORAL EVERY 4 HOURS PRN
Status: DISCONTINUED | OUTPATIENT
Start: 2022-02-19 | End: 2022-02-18

## 2022-02-15 RX ORDER — IBUPROFEN 400 MG/1
400 TABLET ORAL EVERY 6 HOURS PRN
Status: DISCONTINUED | OUTPATIENT
Start: 2022-02-15 | End: 2022-02-21 | Stop reason: HOSPADM

## 2022-02-15 RX ORDER — ONDANSETRON 4 MG/1
4 TABLET, FILM COATED ORAL EVERY 6 HOURS PRN
Status: DISCONTINUED | OUTPATIENT
Start: 2022-02-15 | End: 2022-02-21 | Stop reason: HOSPADM

## 2022-02-15 RX ORDER — DOXEPIN HYDROCHLORIDE 25 MG/1
100 CAPSULE ORAL NIGHTLY
Status: DISCONTINUED | OUTPATIENT
Start: 2022-02-15 | End: 2022-02-21 | Stop reason: HOSPADM

## 2022-02-15 RX ORDER — PANTOPRAZOLE SODIUM 40 MG/1
40 TABLET, DELAYED RELEASE ORAL NIGHTLY
Status: DISCONTINUED | OUTPATIENT
Start: 2022-02-15 | End: 2022-02-21 | Stop reason: HOSPADM

## 2022-02-15 RX ORDER — LORAZEPAM 2 MG/1
2 TABLET ORAL
Status: DISPENSED | OUTPATIENT
Start: 2022-02-15 | End: 2022-02-16

## 2022-02-15 RX ORDER — LORAZEPAM 2 MG/1
2 TABLET ORAL
Status: COMPLETED | OUTPATIENT
Start: 2022-02-16 | End: 2022-02-16

## 2022-02-15 RX ORDER — TRAZODONE HYDROCHLORIDE 50 MG/1
50 TABLET ORAL NIGHTLY PRN
Status: DISCONTINUED | OUTPATIENT
Start: 2022-02-15 | End: 2022-02-21 | Stop reason: HOSPADM

## 2022-02-15 RX ORDER — HYDROXYZINE 50 MG/1
50 TABLET, FILM COATED ORAL EVERY 6 HOURS PRN
Status: DISCONTINUED | OUTPATIENT
Start: 2022-02-15 | End: 2022-02-21 | Stop reason: HOSPADM

## 2022-02-15 RX ORDER — LURASIDONE HYDROCHLORIDE 60 MG/1
60 TABLET, FILM COATED ORAL NIGHTLY
Status: DISCONTINUED | OUTPATIENT
Start: 2022-02-15 | End: 2022-02-21 | Stop reason: HOSPADM

## 2022-02-15 RX ORDER — LORAZEPAM 2 MG/1
2 TABLET ORAL EVERY 4 HOURS PRN
Status: ACTIVE | OUTPATIENT
Start: 2022-02-16 | End: 2022-02-17

## 2022-02-15 RX ORDER — CLONIDINE HYDROCHLORIDE 0.1 MG/1
0.1 TABLET ORAL NIGHTLY
Status: DISCONTINUED | OUTPATIENT
Start: 2022-02-15 | End: 2022-02-21 | Stop reason: HOSPADM

## 2022-02-15 RX ORDER — LORAZEPAM 2 MG/ML
2 INJECTION INTRAMUSCULAR ONCE
Status: COMPLETED | OUTPATIENT
Start: 2022-02-15 | End: 2022-02-15

## 2022-02-15 RX ORDER — LORAZEPAM 0.5 MG/1
0.5 TABLET ORAL
Status: DISPENSED | OUTPATIENT
Start: 2022-02-19 | End: 2022-02-20

## 2022-02-15 RX ORDER — ALUMINA, MAGNESIA, AND SIMETHICONE 2400; 2400; 240 MG/30ML; MG/30ML; MG/30ML
15 SUSPENSION ORAL EVERY 6 HOURS PRN
Status: DISCONTINUED | OUTPATIENT
Start: 2022-02-15 | End: 2022-02-21 | Stop reason: HOSPADM

## 2022-02-15 RX ORDER — MULTIPLE VITAMINS W/ MINERALS TAB 9MG-400MCG
1 TAB ORAL DAILY
Status: DISCONTINUED | OUTPATIENT
Start: 2022-02-15 | End: 2022-02-21 | Stop reason: HOSPADM

## 2022-02-15 RX ORDER — BUPRENORPHINE HYDROCHLORIDE AND NALOXONE HYDROCHLORIDE DIHYDRATE 8; 2 MG/1; MG/1
1 TABLET SUBLINGUAL 2 TIMES DAILY
Status: DISCONTINUED | OUTPATIENT
Start: 2022-02-15 | End: 2022-02-21 | Stop reason: HOSPADM

## 2022-02-15 RX ORDER — PANTOPRAZOLE SODIUM 40 MG/1
40 TABLET, DELAYED RELEASE ORAL NIGHTLY
COMMUNITY

## 2022-02-15 RX ORDER — ECHINACEA PURPUREA EXTRACT 125 MG
2 TABLET ORAL AS NEEDED
Status: DISCONTINUED | OUTPATIENT
Start: 2022-02-15 | End: 2022-02-21 | Stop reason: HOSPADM

## 2022-02-15 RX ORDER — BENZTROPINE MESYLATE 1 MG/ML
1 INJECTION INTRAMUSCULAR; INTRAVENOUS ONCE AS NEEDED
Status: DISCONTINUED | OUTPATIENT
Start: 2022-02-15 | End: 2022-02-21 | Stop reason: HOSPADM

## 2022-02-15 RX ORDER — BENZONATATE 100 MG/1
100 CAPSULE ORAL 3 TIMES DAILY PRN
Status: DISCONTINUED | OUTPATIENT
Start: 2022-02-15 | End: 2022-02-21 | Stop reason: HOSPADM

## 2022-02-15 RX ADMIN — Medication 1 TABLET: at 08:18

## 2022-02-15 RX ADMIN — TAMSULOSIN HYDROCHLORIDE 0.4 MG: 0.4 CAPSULE ORAL at 20:07

## 2022-02-15 RX ADMIN — BUPRENORPHINE HYDROCHLORIDE AND NALOXONE HYDROCHLORIDE DIHYDRATE 1 TABLET: 8; 2 TABLET SUBLINGUAL at 21:08

## 2022-02-15 RX ADMIN — ONDANSETRON HYDROCHLORIDE 4 MG: 4 TABLET, FILM COATED ORAL at 17:44

## 2022-02-15 RX ADMIN — LURASIDONE HYDROCHLORIDE 60 MG: 60 TABLET, FILM COATED ORAL at 22:01

## 2022-02-15 RX ADMIN — Medication 2 TABLET: at 08:18

## 2022-02-15 RX ADMIN — LORAZEPAM 2 MG: 2 INJECTION INTRAMUSCULAR; INTRAVENOUS at 00:25

## 2022-02-15 RX ADMIN — Medication 100 MG: at 08:18

## 2022-02-15 RX ADMIN — BUSPIRONE HYDROCHLORIDE 30 MG: 10 TABLET ORAL at 08:18

## 2022-02-15 RX ADMIN — BUPRENORPHINE HYDROCHLORIDE AND NALOXONE HYDROCHLORIDE DIHYDRATE 1 TABLET: 8; 2 TABLET SUBLINGUAL at 08:18

## 2022-02-15 RX ADMIN — DOXEPIN HYDROCHLORIDE 100 MG: 25 CAPSULE ORAL at 20:10

## 2022-02-15 RX ADMIN — HYDROXYZINE HYDROCHLORIDE 50 MG: 50 TABLET ORAL at 17:44

## 2022-02-15 RX ADMIN — LORAZEPAM 2 MG: 2 TABLET ORAL at 17:44

## 2022-02-15 RX ADMIN — LORAZEPAM 2 MG: 2 TABLET ORAL at 20:08

## 2022-02-15 RX ADMIN — LORAZEPAM 2 MG: 2 TABLET ORAL at 08:18

## 2022-02-15 RX ADMIN — CLONIDINE HYDROCHLORIDE 0.1 MG: 0.1 TABLET ORAL at 20:07

## 2022-02-15 RX ADMIN — PANTOPRAZOLE SODIUM 40 MG: 40 TABLET, DELAYED RELEASE ORAL at 20:07

## 2022-02-15 RX ADMIN — BUSPIRONE HYDROCHLORIDE 30 MG: 10 TABLET ORAL at 20:08

## 2022-02-16 PROCEDURE — 99232 SBSQ HOSP IP/OBS MODERATE 35: CPT | Performed by: PSYCHIATRY & NEUROLOGY

## 2022-02-16 RX ORDER — NALOXONE HYDROCHLORIDE 4 MG/.1ML
1 SPRAY NASAL AS NEEDED
Qty: 2 EACH | Refills: 0 | Status: SHIPPED | OUTPATIENT
Start: 2022-02-16 | End: 2022-02-17

## 2022-02-16 RX ADMIN — PANTOPRAZOLE SODIUM 40 MG: 40 TABLET, DELAYED RELEASE ORAL at 21:25

## 2022-02-16 RX ADMIN — DOXEPIN HYDROCHLORIDE 100 MG: 25 CAPSULE ORAL at 21:25

## 2022-02-16 RX ADMIN — BUSPIRONE HYDROCHLORIDE 30 MG: 10 TABLET ORAL at 21:25

## 2022-02-16 RX ADMIN — TAMSULOSIN HYDROCHLORIDE 0.4 MG: 0.4 CAPSULE ORAL at 21:25

## 2022-02-16 RX ADMIN — Medication 2 TABLET: at 08:10

## 2022-02-16 RX ADMIN — LORAZEPAM 2 MG: 2 TABLET ORAL at 21:25

## 2022-02-16 RX ADMIN — BUPRENORPHINE HYDROCHLORIDE AND NALOXONE HYDROCHLORIDE DIHYDRATE 1 TABLET: 8; 2 TABLET SUBLINGUAL at 21:25

## 2022-02-16 RX ADMIN — Medication 100 MG: at 08:10

## 2022-02-16 RX ADMIN — IBUPROFEN 400 MG: 400 TABLET, FILM COATED ORAL at 21:25

## 2022-02-16 RX ADMIN — Medication 1 TABLET: at 08:10

## 2022-02-16 RX ADMIN — MAGNESIUM HYDROXIDE 10 ML: 2400 SUSPENSION ORAL at 14:07

## 2022-02-16 RX ADMIN — CLONIDINE HYDROCHLORIDE 0.1 MG: 0.1 TABLET ORAL at 21:25

## 2022-02-16 RX ADMIN — BUPRENORPHINE HYDROCHLORIDE AND NALOXONE HYDROCHLORIDE DIHYDRATE 1 TABLET: 8; 2 TABLET SUBLINGUAL at 08:10

## 2022-02-16 RX ADMIN — LORAZEPAM 2 MG: 2 TABLET ORAL at 08:10

## 2022-02-16 RX ADMIN — BUSPIRONE HYDROCHLORIDE 30 MG: 10 TABLET ORAL at 08:10

## 2022-02-16 RX ADMIN — LORAZEPAM 2 MG: 2 TABLET ORAL at 14:07

## 2022-02-17 PROCEDURE — 99232 SBSQ HOSP IP/OBS MODERATE 35: CPT | Performed by: PSYCHIATRY & NEUROLOGY

## 2022-02-17 RX ADMIN — TAMSULOSIN HYDROCHLORIDE 0.4 MG: 0.4 CAPSULE ORAL at 21:20

## 2022-02-17 RX ADMIN — TRAZODONE HYDROCHLORIDE 50 MG: 50 TABLET ORAL at 23:12

## 2022-02-17 RX ADMIN — BUPRENORPHINE HYDROCHLORIDE AND NALOXONE HYDROCHLORIDE DIHYDRATE 1 TABLET: 8; 2 TABLET SUBLINGUAL at 21:20

## 2022-02-17 RX ADMIN — CLONIDINE HYDROCHLORIDE 0.1 MG: 0.1 TABLET ORAL at 21:18

## 2022-02-17 RX ADMIN — BUPRENORPHINE HYDROCHLORIDE AND NALOXONE HYDROCHLORIDE DIHYDRATE 1 TABLET: 8; 2 TABLET SUBLINGUAL at 08:19

## 2022-02-17 RX ADMIN — IBUPROFEN 400 MG: 400 TABLET, FILM COATED ORAL at 21:19

## 2022-02-17 RX ADMIN — LURASIDONE HYDROCHLORIDE 60 MG: 60 TABLET, FILM COATED ORAL at 01:17

## 2022-02-17 RX ADMIN — LORAZEPAM 1.5 MG: 1 TABLET ORAL at 21:18

## 2022-02-17 RX ADMIN — Medication 100 MG: at 08:20

## 2022-02-17 RX ADMIN — HYDROXYZINE HYDROCHLORIDE 50 MG: 50 TABLET ORAL at 14:17

## 2022-02-17 RX ADMIN — PANTOPRAZOLE SODIUM 40 MG: 40 TABLET, DELAYED RELEASE ORAL at 21:20

## 2022-02-17 RX ADMIN — BUSPIRONE HYDROCHLORIDE 30 MG: 10 TABLET ORAL at 08:20

## 2022-02-17 RX ADMIN — Medication 2 TABLET: at 08:20

## 2022-02-17 RX ADMIN — LORAZEPAM 1.5 MG: 1 TABLET ORAL at 14:17

## 2022-02-17 RX ADMIN — LORAZEPAM 1.5 MG: 1 TABLET ORAL at 19:01

## 2022-02-17 RX ADMIN — LORAZEPAM 1.5 MG: 1 TABLET ORAL at 08:19

## 2022-02-17 RX ADMIN — BUSPIRONE HYDROCHLORIDE 30 MG: 10 TABLET ORAL at 21:20

## 2022-02-17 RX ADMIN — LURASIDONE HYDROCHLORIDE 60 MG: 60 TABLET, FILM COATED ORAL at 21:19

## 2022-02-17 RX ADMIN — MAGNESIUM HYDROXIDE 10 ML: 2400 SUSPENSION ORAL at 14:17

## 2022-02-17 RX ADMIN — DOXEPIN HYDROCHLORIDE 100 MG: 25 CAPSULE ORAL at 21:19

## 2022-02-17 RX ADMIN — Medication 1 TABLET: at 08:21

## 2022-02-18 PROCEDURE — 99232 SBSQ HOSP IP/OBS MODERATE 35: CPT | Performed by: PSYCHIATRY & NEUROLOGY

## 2022-02-18 PROCEDURE — 63710000001 PROMETHAZINE PER 25 MG: Performed by: PSYCHIATRY & NEUROLOGY

## 2022-02-18 RX ORDER — CHLORDIAZEPOXIDE HYDROCHLORIDE 25 MG/1
50 CAPSULE, GELATIN COATED ORAL ONCE
Status: COMPLETED | OUTPATIENT
Start: 2022-02-18 | End: 2022-02-18

## 2022-02-18 RX ORDER — LORAZEPAM 2 MG/1
2 TABLET ORAL ONCE
Status: COMPLETED | OUTPATIENT
Start: 2022-02-18 | End: 2022-02-18

## 2022-02-18 RX ORDER — LORAZEPAM 0.5 MG/1
0.5 TABLET ORAL EVERY 4 HOURS PRN
Status: DISCONTINUED | OUTPATIENT
Start: 2022-02-19 | End: 2022-02-18

## 2022-02-18 RX ORDER — LORAZEPAM 2 MG/ML
2 INJECTION INTRAMUSCULAR ONCE
Status: COMPLETED | OUTPATIENT
Start: 2022-02-19 | End: 2022-02-19

## 2022-02-18 RX ORDER — PROMETHAZINE HYDROCHLORIDE 25 MG/1
25 TABLET ORAL ONCE
Status: COMPLETED | OUTPATIENT
Start: 2022-02-18 | End: 2022-02-18

## 2022-02-18 RX ORDER — DIPHENHYDRAMINE HYDROCHLORIDE 50 MG/ML
50 INJECTION INTRAMUSCULAR; INTRAVENOUS ONCE
Status: COMPLETED | OUTPATIENT
Start: 2022-02-19 | End: 2022-02-19

## 2022-02-18 RX ORDER — HALOPERIDOL 5 MG/ML
5 INJECTION INTRAMUSCULAR ONCE
Status: COMPLETED | OUTPATIENT
Start: 2022-02-19 | End: 2022-02-19

## 2022-02-18 RX ORDER — CHLORDIAZEPOXIDE HYDROCHLORIDE 25 MG/1
25 CAPSULE, GELATIN COATED ORAL ONCE
Status: DISCONTINUED | OUTPATIENT
Start: 2022-02-19 | End: 2022-02-19

## 2022-02-18 RX ORDER — LORAZEPAM 2 MG/1
2 TABLET ORAL EVERY 6 HOURS PRN
Status: DISCONTINUED | OUTPATIENT
Start: 2022-02-18 | End: 2022-02-18

## 2022-02-18 RX ORDER — LORAZEPAM 1 MG/1
1 TABLET ORAL
Status: DISCONTINUED | OUTPATIENT
Start: 2022-02-18 | End: 2022-02-18

## 2022-02-18 RX ORDER — LORAZEPAM 0.5 MG/1
0.5 TABLET ORAL EVERY 4 HOURS PRN
Status: DISCONTINUED | OUTPATIENT
Start: 2022-02-22 | End: 2022-02-19

## 2022-02-18 RX ORDER — LORAZEPAM 1 MG/1
1 TABLET ORAL
Status: DISCONTINUED | OUTPATIENT
Start: 2022-02-18 | End: 2022-02-19

## 2022-02-18 RX ADMIN — LORAZEPAM 1 MG: 1 TABLET ORAL at 19:29

## 2022-02-18 RX ADMIN — TAMSULOSIN HYDROCHLORIDE 0.4 MG: 0.4 CAPSULE ORAL at 19:32

## 2022-02-18 RX ADMIN — DOXEPIN HYDROCHLORIDE 100 MG: 25 CAPSULE ORAL at 21:09

## 2022-02-18 RX ADMIN — Medication 100 MG: at 09:05

## 2022-02-18 RX ADMIN — LURASIDONE HYDROCHLORIDE 60 MG: 60 TABLET, FILM COATED ORAL at 19:30

## 2022-02-18 RX ADMIN — CHLORDIAZEPOXIDE HYDROCHLORIDE 50 MG: 25 CAPSULE ORAL at 07:10

## 2022-02-18 RX ADMIN — PROMETHAZINE HYDROCHLORIDE 25 MG: 25 TABLET ORAL at 07:10

## 2022-02-18 RX ADMIN — LORAZEPAM 1.5 MG: 1 TABLET ORAL at 04:37

## 2022-02-18 RX ADMIN — BUPRENORPHINE HYDROCHLORIDE AND NALOXONE HYDROCHLORIDE DIHYDRATE 1 TABLET: 8; 2 TABLET SUBLINGUAL at 19:31

## 2022-02-18 RX ADMIN — LORAZEPAM 2 MG: 2 TABLET ORAL at 07:10

## 2022-02-18 RX ADMIN — PANTOPRAZOLE SODIUM 40 MG: 40 TABLET, DELAYED RELEASE ORAL at 19:32

## 2022-02-18 RX ADMIN — LORAZEPAM 1 MG: 1 TABLET ORAL at 21:10

## 2022-02-18 RX ADMIN — CHLORDIAZEPOXIDE HYDROCHLORIDE 50 MG: 25 CAPSULE ORAL at 21:08

## 2022-02-18 RX ADMIN — IBUPROFEN 400 MG: 400 TABLET, FILM COATED ORAL at 23:27

## 2022-02-18 RX ADMIN — TRAZODONE HYDROCHLORIDE 50 MG: 50 TABLET ORAL at 19:32

## 2022-02-18 RX ADMIN — LORAZEPAM 2 MG: 2 TABLET ORAL at 05:16

## 2022-02-18 RX ADMIN — BUPRENORPHINE HYDROCHLORIDE AND NALOXONE HYDROCHLORIDE DIHYDRATE 1 TABLET: 8; 2 TABLET SUBLINGUAL at 09:04

## 2022-02-18 RX ADMIN — HYDROXYZINE HYDROCHLORIDE 50 MG: 50 TABLET ORAL at 19:32

## 2022-02-18 RX ADMIN — LORAZEPAM 1.5 MG: 1 TABLET ORAL at 00:24

## 2022-02-18 RX ADMIN — HYDROXYZINE HYDROCHLORIDE 50 MG: 50 TABLET ORAL at 00:24

## 2022-02-18 RX ADMIN — Medication 1 TABLET: at 09:06

## 2022-02-18 RX ADMIN — BUSPIRONE HYDROCHLORIDE 30 MG: 10 TABLET ORAL at 09:05

## 2022-02-18 RX ADMIN — HYDROXYZINE HYDROCHLORIDE 50 MG: 50 TABLET ORAL at 05:34

## 2022-02-18 RX ADMIN — CLONIDINE HYDROCHLORIDE 0.1 MG: 0.1 TABLET ORAL at 19:31

## 2022-02-18 RX ADMIN — Medication 2 TABLET: at 09:04

## 2022-02-18 RX ADMIN — LORAZEPAM 1 MG: 1 TABLET ORAL at 14:24

## 2022-02-18 RX ADMIN — LORAZEPAM 1 MG: 1 TABLET ORAL at 09:04

## 2022-02-18 RX ADMIN — LORAZEPAM 1 MG: 1 TABLET ORAL at 22:14

## 2022-02-18 RX ADMIN — BUSPIRONE HYDROCHLORIDE 30 MG: 10 TABLET ORAL at 21:08

## 2022-02-18 RX ADMIN — LORAZEPAM 1 MG: 1 TABLET ORAL at 23:20

## 2022-02-18 RX ADMIN — CHLORDIAZEPOXIDE HYDROCHLORIDE 50 MG: 25 CAPSULE ORAL at 19:32

## 2022-02-19 PROCEDURE — 25010000002 LORAZEPAM PER 2 MG: Performed by: PSYCHIATRY & NEUROLOGY

## 2022-02-19 PROCEDURE — 25010000002 HALOPERIDOL LACTATE PER 5 MG: Performed by: PSYCHIATRY & NEUROLOGY

## 2022-02-19 PROCEDURE — 25010000002 DIPHENHYDRAMINE PER 50 MG: Performed by: PSYCHIATRY & NEUROLOGY

## 2022-02-19 PROCEDURE — 63710000001 DIPHENHYDRAMINE PER 50 MG: Performed by: PSYCHIATRY & NEUROLOGY

## 2022-02-19 PROCEDURE — 99233 SBSQ HOSP IP/OBS HIGH 50: CPT | Performed by: PSYCHIATRY & NEUROLOGY

## 2022-02-19 RX ORDER — HALOPERIDOL 5 MG/ML
10 INJECTION INTRAMUSCULAR ONCE
Status: COMPLETED | OUTPATIENT
Start: 2022-02-19 | End: 2022-02-19

## 2022-02-19 RX ORDER — CHLORDIAZEPOXIDE HYDROCHLORIDE 5 MG/1
20 CAPSULE, GELATIN COATED ORAL ONCE
Status: COMPLETED | OUTPATIENT
Start: 2022-02-19 | End: 2022-02-19

## 2022-02-19 RX ORDER — DIPHENHYDRAMINE HCL 50 MG
50 CAPSULE ORAL ONCE
Status: COMPLETED | OUTPATIENT
Start: 2022-02-19 | End: 2022-02-19

## 2022-02-19 RX ORDER — NICOTINE 21 MG/24HR
1 PATCH, TRANSDERMAL 24 HOURS TRANSDERMAL
Status: DISCONTINUED | OUTPATIENT
Start: 2022-02-19 | End: 2022-02-21 | Stop reason: HOSPADM

## 2022-02-19 RX ORDER — LORAZEPAM 2 MG/1
2 TABLET ORAL
Status: DISCONTINUED | OUTPATIENT
Start: 2022-02-19 | End: 2022-02-21 | Stop reason: HOSPADM

## 2022-02-19 RX ORDER — CHLORDIAZEPOXIDE HYDROCHLORIDE 25 MG/1
50 CAPSULE, GELATIN COATED ORAL ONCE
Status: COMPLETED | OUTPATIENT
Start: 2022-02-19 | End: 2022-02-19

## 2022-02-19 RX ORDER — CHLORDIAZEPOXIDE HYDROCHLORIDE 25 MG/1
50 CAPSULE, GELATIN COATED ORAL 2 TIMES DAILY
Status: COMPLETED | OUTPATIENT
Start: 2022-02-19 | End: 2022-02-20

## 2022-02-19 RX ORDER — HALOPERIDOL 5 MG/1
10 TABLET ORAL ONCE
Status: COMPLETED | OUTPATIENT
Start: 2022-02-19 | End: 2022-02-19

## 2022-02-19 RX ORDER — LORAZEPAM 0.5 MG/1
0.5 TABLET ORAL EVERY 4 HOURS PRN
Status: DISCONTINUED | OUTPATIENT
Start: 2022-02-22 | End: 2022-02-21 | Stop reason: HOSPADM

## 2022-02-19 RX ORDER — CLONIDINE HYDROCHLORIDE 0.1 MG/1
0.1 TABLET ORAL ONCE
Status: COMPLETED | OUTPATIENT
Start: 2022-02-19 | End: 2022-02-19

## 2022-02-19 RX ADMIN — CHLORDIAZEPOXIDE HYDROCHLORIDE 20 MG: 5 CAPSULE ORAL at 08:22

## 2022-02-19 RX ADMIN — CHLORDIAZEPOXIDE HYDROCHLORIDE 20 MG: 5 CAPSULE ORAL at 04:34

## 2022-02-19 RX ADMIN — LORAZEPAM 2 MG: 2 TABLET ORAL at 17:07

## 2022-02-19 RX ADMIN — BUSPIRONE HYDROCHLORIDE 30 MG: 10 TABLET ORAL at 21:31

## 2022-02-19 RX ADMIN — LORAZEPAM 1 MG: 1 TABLET ORAL at 04:09

## 2022-02-19 RX ADMIN — LORAZEPAM 2 MG: 2 TABLET ORAL at 22:35

## 2022-02-19 RX ADMIN — CLONIDINE HYDROCHLORIDE 0.1 MG: 0.1 TABLET ORAL at 21:31

## 2022-02-19 RX ADMIN — DOXEPIN HYDROCHLORIDE 100 MG: 25 CAPSULE ORAL at 21:32

## 2022-02-19 RX ADMIN — LORAZEPAM 1 MG: 1 TABLET ORAL at 01:08

## 2022-02-19 RX ADMIN — LORAZEPAM 1 MG: 1 TABLET ORAL at 13:31

## 2022-02-19 RX ADMIN — LORAZEPAM 1 MG: 1 TABLET ORAL at 05:24

## 2022-02-19 RX ADMIN — NICOTINE 1 PATCH: 21 PATCH, EXTENDED RELEASE TRANSDERMAL at 15:19

## 2022-02-19 RX ADMIN — DIPHENHYDRAMINE HYDROCHLORIDE 50 MG: 50 INJECTION, SOLUTION INTRAMUSCULAR; INTRAVENOUS at 00:02

## 2022-02-19 RX ADMIN — IBUPROFEN 400 MG: 400 TABLET, FILM COATED ORAL at 21:33

## 2022-02-19 RX ADMIN — LORAZEPAM 2 MG: 2 INJECTION, SOLUTION INTRAMUSCULAR; INTRAVENOUS at 00:02

## 2022-02-19 RX ADMIN — LURASIDONE HYDROCHLORIDE 60 MG: 60 TABLET, FILM COATED ORAL at 21:43

## 2022-02-19 RX ADMIN — DIPHENHYDRAMINE HYDROCHLORIDE 50 MG: 50 CAPSULE ORAL at 12:02

## 2022-02-19 RX ADMIN — LORAZEPAM 2 MG: 2 TABLET ORAL at 21:34

## 2022-02-19 RX ADMIN — HALOPERIDOL 10 MG: 5 TABLET ORAL at 11:25

## 2022-02-19 RX ADMIN — TAMSULOSIN HYDROCHLORIDE 0.4 MG: 0.4 CAPSULE ORAL at 21:31

## 2022-02-19 RX ADMIN — IBUPROFEN 400 MG: 400 TABLET, FILM COATED ORAL at 05:24

## 2022-02-19 RX ADMIN — HYDROXYZINE HYDROCHLORIDE 50 MG: 50 TABLET ORAL at 01:59

## 2022-02-19 RX ADMIN — LORAZEPAM 1 MG: 1 TABLET ORAL at 03:09

## 2022-02-19 RX ADMIN — Medication 2 TABLET: at 08:22

## 2022-02-19 RX ADMIN — LORAZEPAM 0.5 MG: 0.5 TABLET ORAL at 21:32

## 2022-02-19 RX ADMIN — LORAZEPAM 2 MG: 2 TABLET ORAL at 18:07

## 2022-02-19 RX ADMIN — HALOPERIDOL LACTATE 10 MG: 5 INJECTION, SOLUTION INTRAMUSCULAR at 06:02

## 2022-02-19 RX ADMIN — Medication 100 MG: at 08:23

## 2022-02-19 RX ADMIN — Medication 1 TABLET: at 08:22

## 2022-02-19 RX ADMIN — BUSPIRONE HYDROCHLORIDE 30 MG: 10 TABLET ORAL at 08:23

## 2022-02-19 RX ADMIN — LORAZEPAM 1 MG: 1 TABLET ORAL at 12:03

## 2022-02-19 RX ADMIN — BUPRENORPHINE HYDROCHLORIDE AND NALOXONE HYDROCHLORIDE DIHYDRATE 1 TABLET: 8; 2 TABLET SUBLINGUAL at 21:32

## 2022-02-19 RX ADMIN — LORAZEPAM 1 MG: 1 TABLET ORAL at 02:09

## 2022-02-19 RX ADMIN — CHLORDIAZEPOXIDE HYDROCHLORIDE 50 MG: 25 CAPSULE ORAL at 21:32

## 2022-02-19 RX ADMIN — CHLORDIAZEPOXIDE HYDROCHLORIDE 50 MG: 25 CAPSULE ORAL at 11:25

## 2022-02-19 RX ADMIN — LORAZEPAM 2 MG: 2 TABLET ORAL at 16:06

## 2022-02-19 RX ADMIN — LORAZEPAM 2 MG: 2 TABLET ORAL at 19:07

## 2022-02-19 RX ADMIN — HYDROXYZINE HYDROCHLORIDE 50 MG: 50 TABLET ORAL at 10:00

## 2022-02-19 RX ADMIN — LORAZEPAM 1 MG: 1 TABLET ORAL at 08:23

## 2022-02-19 RX ADMIN — LORAZEPAM 1 MG: 1 TABLET ORAL at 14:34

## 2022-02-19 RX ADMIN — HALOPERIDOL LACTATE 5 MG: 5 INJECTION, SOLUTION INTRAMUSCULAR at 00:01

## 2022-02-19 RX ADMIN — PANTOPRAZOLE SODIUM 40 MG: 40 TABLET, DELAYED RELEASE ORAL at 21:32

## 2022-02-19 RX ADMIN — HYDROXYZINE HYDROCHLORIDE 50 MG: 50 TABLET ORAL at 21:32

## 2022-02-19 RX ADMIN — LORAZEPAM 1 MG: 1 TABLET ORAL at 10:00

## 2022-02-19 RX ADMIN — BUPRENORPHINE HYDROCHLORIDE AND NALOXONE HYDROCHLORIDE DIHYDRATE 1 TABLET: 8; 2 TABLET SUBLINGUAL at 08:22

## 2022-02-19 RX ADMIN — CHLORDIAZEPOXIDE HYDROCHLORIDE 50 MG: 25 CAPSULE ORAL at 12:33

## 2022-02-19 RX ADMIN — CLONIDINE HYDROCHLORIDE 0.1 MG: 0.1 TABLET ORAL at 16:06

## 2022-02-20 PROCEDURE — 99233 SBSQ HOSP IP/OBS HIGH 50: CPT | Performed by: PSYCHIATRY & NEUROLOGY

## 2022-02-20 RX ORDER — CHLORDIAZEPOXIDE HYDROCHLORIDE 25 MG/1
25 CAPSULE, GELATIN COATED ORAL 2 TIMES DAILY
Status: DISCONTINUED | OUTPATIENT
Start: 2022-02-21 | End: 2022-02-21 | Stop reason: HOSPADM

## 2022-02-20 RX ORDER — CHLORDIAZEPOXIDE HYDROCHLORIDE 25 MG/1
50 CAPSULE, GELATIN COATED ORAL ONCE
Status: DISCONTINUED | OUTPATIENT
Start: 2022-02-20 | End: 2022-02-21

## 2022-02-20 RX ADMIN — LORAZEPAM 2 MG: 2 TABLET ORAL at 11:18

## 2022-02-20 RX ADMIN — LURASIDONE HYDROCHLORIDE 60 MG: 60 TABLET, FILM COATED ORAL at 21:43

## 2022-02-20 RX ADMIN — CHLORDIAZEPOXIDE HYDROCHLORIDE 50 MG: 25 CAPSULE ORAL at 08:39

## 2022-02-20 RX ADMIN — LORAZEPAM 2 MG: 2 TABLET ORAL at 13:42

## 2022-02-20 RX ADMIN — BUSPIRONE HYDROCHLORIDE 30 MG: 10 TABLET ORAL at 08:40

## 2022-02-20 RX ADMIN — Medication 2 TABLET: at 08:40

## 2022-02-20 RX ADMIN — TAMSULOSIN HYDROCHLORIDE 0.4 MG: 0.4 CAPSULE ORAL at 21:43

## 2022-02-20 RX ADMIN — Medication 1 TABLET: at 08:41

## 2022-02-20 RX ADMIN — PANTOPRAZOLE SODIUM 40 MG: 40 TABLET, DELAYED RELEASE ORAL at 21:43

## 2022-02-20 RX ADMIN — BUSPIRONE HYDROCHLORIDE 30 MG: 10 TABLET ORAL at 21:43

## 2022-02-20 RX ADMIN — DOXEPIN HYDROCHLORIDE 100 MG: 25 CAPSULE ORAL at 21:43

## 2022-02-20 RX ADMIN — TRAZODONE HYDROCHLORIDE 50 MG: 50 TABLET ORAL at 23:28

## 2022-02-20 RX ADMIN — BUPRENORPHINE HYDROCHLORIDE AND NALOXONE HYDROCHLORIDE DIHYDRATE 1 TABLET: 8; 2 TABLET SUBLINGUAL at 08:40

## 2022-02-20 RX ADMIN — BUPRENORPHINE HYDROCHLORIDE AND NALOXONE HYDROCHLORIDE DIHYDRATE 1 TABLET: 8; 2 TABLET SUBLINGUAL at 21:44

## 2022-02-20 RX ADMIN — NICOTINE 1 PATCH: 21 PATCH, EXTENDED RELEASE TRANSDERMAL at 08:43

## 2022-02-20 RX ADMIN — Medication 200 MG: at 08:39

## 2022-02-20 RX ADMIN — HYDROXYZINE HYDROCHLORIDE 50 MG: 50 TABLET ORAL at 23:28

## 2022-02-20 RX ADMIN — IBUPROFEN 400 MG: 400 TABLET, FILM COATED ORAL at 23:28

## 2022-02-21 VITALS
DIASTOLIC BLOOD PRESSURE: 87 MMHG | HEIGHT: 70 IN | OXYGEN SATURATION: 97 % | WEIGHT: 164.8 LBS | SYSTOLIC BLOOD PRESSURE: 123 MMHG | HEART RATE: 102 BPM | RESPIRATION RATE: 18 BRPM | BODY MASS INDEX: 23.59 KG/M2 | TEMPERATURE: 97.5 F

## 2022-02-21 PROCEDURE — 99239 HOSP IP/OBS DSCHRG MGMT >30: CPT | Performed by: PSYCHIATRY & NEUROLOGY

## 2022-02-21 RX ORDER — CHLORDIAZEPOXIDE HYDROCHLORIDE 10 MG/1
10 CAPSULE, GELATIN COATED ORAL 3 TIMES DAILY PRN
Qty: 6 CAPSULE | Refills: 0 | Status: SHIPPED | OUTPATIENT
Start: 2022-02-21

## 2022-02-21 RX ADMIN — CHLORDIAZEPOXIDE HYDROCHLORIDE 25 MG: 25 CAPSULE ORAL at 08:41

## 2022-02-21 RX ADMIN — Medication 200 MG: at 08:41

## 2022-02-21 RX ADMIN — Medication 1 TABLET: at 10:22

## 2022-02-21 RX ADMIN — BUPRENORPHINE HYDROCHLORIDE AND NALOXONE HYDROCHLORIDE DIHYDRATE 1 TABLET: 8; 2 TABLET SUBLINGUAL at 08:40

## 2022-02-21 RX ADMIN — Medication 2 TABLET: at 08:41

## 2022-02-21 RX ADMIN — BUSPIRONE HYDROCHLORIDE 30 MG: 10 TABLET ORAL at 10:22

## 2022-03-11 ENCOUNTER — OFFICE VISIT (OUTPATIENT)
Dept: FAMILY MEDICINE CLINIC | Age: 47
End: 2022-03-11
Payer: COMMERCIAL

## 2022-03-11 VITALS
SYSTOLIC BLOOD PRESSURE: 122 MMHG | WEIGHT: 201.2 LBS | TEMPERATURE: 96.8 F | DIASTOLIC BLOOD PRESSURE: 68 MMHG | HEIGHT: 72 IN | OXYGEN SATURATION: 98 % | HEART RATE: 89 BPM | BODY MASS INDEX: 27.25 KG/M2 | RESPIRATION RATE: 16 BRPM

## 2022-03-11 DIAGNOSIS — R11.0 NAUSEA: ICD-10-CM

## 2022-03-11 DIAGNOSIS — H81.01 MENIERE'S DISEASE OF RIGHT EAR: Primary | ICD-10-CM

## 2022-03-11 PROCEDURE — 99213 OFFICE O/P EST LOW 20 MIN: CPT | Performed by: NURSE PRACTITIONER

## 2022-03-11 RX ORDER — PREDNISONE 10 MG/1
10 TABLET ORAL SEE ADMIN INSTRUCTIONS
Qty: 21 TABLET | Refills: 0 | Status: SHIPPED
Start: 2022-03-11 | End: 2022-03-17 | Stop reason: ALTCHOICE

## 2022-03-11 RX ORDER — ONDANSETRON 4 MG/1
4 TABLET, ORALLY DISINTEGRATING ORAL EVERY 8 HOURS PRN
Qty: 15 TABLET | Refills: 0 | Status: SHIPPED | OUTPATIENT
Start: 2022-03-11 | End: 2022-03-16

## 2022-03-11 RX ORDER — DIAZEPAM 2 MG/1
2 TABLET ORAL EVERY 6 HOURS PRN
Qty: 40 TABLET | Refills: 0 | Status: SHIPPED | OUTPATIENT
Start: 2022-03-11 | End: 2022-03-21

## 2022-03-11 ASSESSMENT — PATIENT HEALTH QUESTIONNAIRE - PHQ9
8. MOVING OR SPEAKING SO SLOWLY THAT OTHER PEOPLE COULD HAVE NOTICED. OR THE OPPOSITE, BEING SO FIGETY OR RESTLESS THAT YOU HAVE BEEN MOVING AROUND A LOT MORE THAN USUAL: 0
4. FEELING TIRED OR HAVING LITTLE ENERGY: 2
SUM OF ALL RESPONSES TO PHQ QUESTIONS 1-9: 8
SUM OF ALL RESPONSES TO PHQ QUESTIONS 1-9: 8
9. THOUGHTS THAT YOU WOULD BE BETTER OFF DEAD, OR OF HURTING YOURSELF: 0
6. FEELING BAD ABOUT YOURSELF - OR THAT YOU ARE A FAILURE OR HAVE LET YOURSELF OR YOUR FAMILY DOWN: 0
SUM OF ALL RESPONSES TO PHQ9 QUESTIONS 1 & 2: 4
SUM OF ALL RESPONSES TO PHQ QUESTIONS 1-9: 8
10. IF YOU CHECKED OFF ANY PROBLEMS, HOW DIFFICULT HAVE THESE PROBLEMS MADE IT FOR YOU TO DO YOUR WORK, TAKE CARE OF THINGS AT HOME, OR GET ALONG WITH OTHER PEOPLE: 2
5. POOR APPETITE OR OVEREATING: 1
2. FEELING DOWN, DEPRESSED OR HOPELESS: 2
1. LITTLE INTEREST OR PLEASURE IN DOING THINGS: 2
3. TROUBLE FALLING OR STAYING ASLEEP: 0
7. TROUBLE CONCENTRATING ON THINGS, SUCH AS READING THE NEWSPAPER OR WATCHING TELEVISION: 1
SUM OF ALL RESPONSES TO PHQ QUESTIONS 1-9: 8

## 2022-03-11 ASSESSMENT — ENCOUNTER SYMPTOMS
COUGH: 0
DIARRHEA: 0
SHORTNESS OF BREATH: 0
CONSTIPATION: 0
NAUSEA: 1
WHEEZING: 0
BACK PAIN: 0
VOMITING: 1

## 2022-03-11 NOTE — PROGRESS NOTES
Esdras Griffiths (:  1975) is a 42 y.o. male,Established patient, here for evaluation of the following chief complaint(s):  Otalgia (right ear pain with dizziness)         ASSESSMENT/PLAN:  1. Meniere's disease of right ear  -     diazePAM (VALIUM) 2 MG tablet; Take 1 tablet by mouth every 6 hours as needed (dizziness) for up to 10 days. , Disp-40 tablet, R-0Normal  -     predniSONE (DELTASONE) 10 MG tablet; Take 1 tablet by mouth See Admin Instructions for 6 days, Disp-21 tablet, R-0Normal  - Reviewed side effects of medication and patient verbalizes understanding.   - Advised to call back directly if there are further questions, or if these symptoms fail to improve as anticipated or worsen. - Start Zyrtec and Flonase    2. Nausea  -     ondansetron (ZOFRAN ODT) 4 MG disintegrating tablet; Take 1 tablet by mouth every 8 hours as needed for Nausea, Disp-15 tablet, R-0Normal  - Reviewed side effects of medication and patient verbalizes understanding. Return if symptoms worsen or fail to improve. Controlled Substance Monitoring:    Acute and Chronic Pain Monitoring:   RX Monitoring 3/11/2022   Periodic Controlled Substance Monitoring No signs of potential drug abuse or diversion identified.;Obtaining appropriate analgesic effect of treatment. Subjective   SUBJECTIVE/OBJECTIVE:  HPI  Here today for Menière's flare. symptoms started about 3 days ago. Has been having nausea/vomiting, dizziness, appetite decreased. Has been taking his Valium with some symptom relief. Steroids often help relieve symptoms. /68   Pulse 89   Temp 96.8 °F (36 °C)   Resp 16   Ht 6' (1.829 m)   Wt 201 lb 3.2 oz (91.3 kg)   SpO2 98%   BMI 27.29 kg/m²     Review of Systems   Constitutional: Positive for appetite change (decreased). Negative for activity change, chills, diaphoresis, fever and unexpected weight change. HENT: Positive for ear pain (right).     Respiratory: Negative for cough, shortness of breath and wheezing. Cardiovascular: Negative for chest pain and palpitations. Gastrointestinal: Positive for nausea and vomiting. Negative for constipation and diarrhea. Genitourinary: Negative for difficulty urinating. Musculoskeletal: Negative for arthralgias, back pain and neck pain. Neurological: Positive for dizziness and headaches. Negative for weakness. Psychiatric/Behavioral: Negative for dysphoric mood and sleep disturbance. The patient is not nervous/anxious. Objective   Physical Exam  Constitutional:       General: He is awake. Appearance: Normal appearance. He is well-developed. HENT:      Head: Normocephalic and atraumatic. Right Ear: Tympanic membrane is bulging. Left Ear: Tympanic membrane normal.   Neck:      Thyroid: No thyromegaly. Trachea: No tracheal deviation. Cardiovascular:      Rate and Rhythm: Normal rate and regular rhythm. Heart sounds: No murmur heard. Pulmonary:      Effort: Pulmonary effort is normal. No respiratory distress. Breath sounds: Normal breath sounds. Abdominal:      General: Bowel sounds are normal.      Palpations: Abdomen is soft. Tenderness: There is no abdominal tenderness. Musculoskeletal:         General: Normal range of motion. Lymphadenopathy:      Cervical: No cervical adenopathy. Skin:     General: Skin is warm and dry. Neurological:      Mental Status: He is alert and oriented to person, place, and time. Psychiatric:         Behavior: Behavior normal.            [facility]      An electronic signature was used to authenticate this note.     --ADAN Will - CNP

## 2022-03-17 ENCOUNTER — OFFICE VISIT (OUTPATIENT)
Dept: FAMILY MEDICINE CLINIC | Age: 47
End: 2022-03-17
Payer: COMMERCIAL

## 2022-03-17 VITALS
HEART RATE: 83 BPM | SYSTOLIC BLOOD PRESSURE: 126 MMHG | BODY MASS INDEX: 27.9 KG/M2 | RESPIRATION RATE: 16 BRPM | OXYGEN SATURATION: 99 % | DIASTOLIC BLOOD PRESSURE: 60 MMHG | HEIGHT: 72 IN | WEIGHT: 206 LBS | TEMPERATURE: 98.1 F

## 2022-03-17 DIAGNOSIS — H81.01 MENIERE'S DISEASE OF RIGHT EAR: Primary | ICD-10-CM

## 2022-03-17 DIAGNOSIS — R11.0 NAUSEA: ICD-10-CM

## 2022-03-17 PROCEDURE — 99213 OFFICE O/P EST LOW 20 MIN: CPT | Performed by: NURSE PRACTITIONER

## 2022-03-17 RX ORDER — SCOLOPAMINE TRANSDERMAL SYSTEM 1 MG/1
1 PATCH, EXTENDED RELEASE TRANSDERMAL
Qty: 10 PATCH | Refills: 1 | Status: SHIPPED
Start: 2022-03-17 | End: 2022-04-19

## 2022-03-17 ASSESSMENT — ENCOUNTER SYMPTOMS
WHEEZING: 0
NAUSEA: 1
CONSTIPATION: 0
SHORTNESS OF BREATH: 0
DIARRHEA: 0
VOMITING: 0
COUGH: 0

## 2022-03-17 NOTE — PROGRESS NOTES
Nima Schroeder (:  1975) is a 42 y.o. male,Established patient, here for evaluation of the following chief complaint(s):  Otalgia (menieres flare up x1 week)         ASSESSMENT/PLAN:  1. Meniere's disease of right ear  -     scopolamine (TRANSDERM SCOP, 1.5 MG,) transdermal patch; Place 1 patch onto the skin every 72 hours, Disp-10 patch, R-1Normal  2. Nausea  -     scopolamine (TRANSDERM SCOP, 1.5 MG,) transdermal patch; Place 1 patch onto the skin every 72 hours, Disp-10 patch, R-1Normal  -  Reviewed side effects of medication and patient verbalizes understanding.   -  Advised to call back directly if there are further questions, or if these symptoms fail to improve as anticipated or worsen. -  Will consider repeating steroids (prednisone 20 mg twice daily for 5 days) if patch does not control symptoms  -  Keep appointment with specialist  -  Will [place] out [place] paperwork to continue off work until seen by specialist       Return if symptoms worsen or fail to improve. Subjective   SUBJECTIVE/OBJECTIVE:  HPI  Here today for continued Menière's flare. Finished steroids yesterday. Symptoms did improve slightly. Still having a lot of dizziness. Not able to work. Called specialist to get in for more steroid injections to ear. No appointment until May, on wait list for cancellation. Taking the valium at night. /60   Pulse 83   Temp 98.1 °F (36.7 °C) (Temporal)   Resp 16   Ht 6' (1.829 m)   Wt 206 lb (93.4 kg)   SpO2 99%   BMI 27.94 kg/m²     Review of Systems   Constitutional: Negative for activity change, appetite change, chills, diaphoresis, fever and unexpected weight change. HENT: Positive for ear pain and tinnitus. Respiratory: Negative for cough, shortness of breath and wheezing. Cardiovascular: Negative for chest pain and palpitations. Gastrointestinal: Positive for nausea. Negative for constipation, diarrhea and vomiting.    Genitourinary: Negative for difficulty urinating. Neurological: Positive for dizziness. Negative for weakness and headaches. Objective   Physical Exam  Constitutional:       Appearance: He is well-developed. HENT:      Head: Normocephalic and atraumatic. Neck:      Thyroid: No thyromegaly. Trachea: No tracheal deviation. Cardiovascular:      Rate and Rhythm: Normal rate and regular rhythm. Heart sounds: No murmur heard. Pulmonary:      Effort: Pulmonary effort is normal. No respiratory distress. Breath sounds: Normal breath sounds. Abdominal:      General: Bowel sounds are normal.      Palpations: Abdomen is soft. Tenderness: There is no abdominal tenderness. Musculoskeletal:         General: Normal range of motion. Lymphadenopathy:      Cervical: No cervical adenopathy. Skin:     General: Skin is warm and dry. Neurological:      Mental Status: He is alert and oriented to person, place, and time. Psychiatric:         Behavior: Behavior normal.            [facility]      An electronic signature was used to authenticate this note.     --Libby Delaney, ADAN - CNP

## 2022-03-23 ENCOUNTER — HOSPITAL ENCOUNTER (EMERGENCY)
Facility: HOSPITAL | Age: 47
Discharge: HOME | End: 2022-03-23
Attending: STUDENT IN AN ORGANIZED HEALTH CARE EDUCATION/TRAINING PROGRAM | Admitting: STUDENT IN AN ORGANIZED HEALTH CARE EDUCATION/TRAINING PROGRAM

## 2022-03-23 VITALS
HEIGHT: 70 IN | SYSTOLIC BLOOD PRESSURE: 134 MMHG | TEMPERATURE: 97.7 F | BODY MASS INDEX: 25.05 KG/M2 | RESPIRATION RATE: 18 BRPM | DIASTOLIC BLOOD PRESSURE: 91 MMHG | OXYGEN SATURATION: 96 % | HEART RATE: 114 BPM | WEIGHT: 175 LBS

## 2022-03-23 DIAGNOSIS — F10.10 ALCOHOL ABUSE: Primary | ICD-10-CM

## 2022-03-23 LAB
ALBUMIN SERPL-MCNC: 4.2 G/DL (ref 3.5–5.2)
ALBUMIN/GLOB SERPL: 1.8 G/DL
ALP SERPL-CCNC: 129 U/L (ref 39–117)
ALT SERPL W P-5'-P-CCNC: 90 U/L (ref 1–41)
AMPHET+METHAMPHET UR QL: NEGATIVE
AMPHETAMINES UR QL: NEGATIVE
ANION GAP SERPL CALCULATED.3IONS-SCNC: 12 MMOL/L (ref 5–15)
AST SERPL-CCNC: 72 U/L (ref 1–40)
BARBITURATES UR QL SCN: NEGATIVE
BASOPHILS # BLD AUTO: 0.06 10*3/MM3 (ref 0–0.2)
BASOPHILS NFR BLD AUTO: 0.9 % (ref 0–1.5)
BENZODIAZ UR QL SCN: NEGATIVE
BILIRUB SERPL-MCNC: 0.3 MG/DL (ref 0–1.2)
BILIRUB UR QL STRIP: NEGATIVE
BUN SERPL-MCNC: 4 MG/DL (ref 6–20)
BUN/CREAT SERPL: 4.5 (ref 7–25)
BUPRENORPHINE SERPL-MCNC: POSITIVE NG/ML
CALCIUM SPEC-SCNC: 9.1 MG/DL (ref 8.6–10.5)
CANNABINOIDS SERPL QL: POSITIVE
CHLORIDE SERPL-SCNC: 105 MMOL/L (ref 98–107)
CLARITY UR: CLEAR
CO2 SERPL-SCNC: 23 MMOL/L (ref 22–29)
COCAINE UR QL: NEGATIVE
COLOR UR: YELLOW
CREAT SERPL-MCNC: 0.88 MG/DL (ref 0.76–1.27)
DEPRECATED RDW RBC AUTO: 55.8 FL (ref 37–54)
EGFRCR SERPLBLD CKD-EPI 2021: 108.7 ML/MIN/1.73
EOSINOPHIL # BLD AUTO: 0.31 10*3/MM3 (ref 0–0.4)
EOSINOPHIL NFR BLD AUTO: 4.7 % (ref 0.3–6.2)
ERYTHROCYTE [DISTWIDTH] IN BLOOD BY AUTOMATED COUNT: 15.7 % (ref 12.3–15.4)
ETHANOL BLD-MCNC: 202 MG/DL (ref 0–10)
ETHANOL UR QL: 0.2 %
FLUAV RNA RESP QL NAA+PROBE: NOT DETECTED
FLUBV RNA RESP QL NAA+PROBE: NOT DETECTED
GLOBULIN UR ELPH-MCNC: 2.4 GM/DL
GLUCOSE SERPL-MCNC: 105 MG/DL (ref 65–99)
GLUCOSE UR STRIP-MCNC: NEGATIVE MG/DL
HCT VFR BLD AUTO: 44.4 % (ref 37.5–51)
HGB BLD-MCNC: 14.8 G/DL (ref 13–17.7)
HGB UR QL STRIP.AUTO: NEGATIVE
IMM GRANULOCYTES # BLD AUTO: 0.03 10*3/MM3 (ref 0–0.05)
IMM GRANULOCYTES NFR BLD AUTO: 0.5 % (ref 0–0.5)
KETONES UR QL STRIP: NEGATIVE
LEUKOCYTE ESTERASE UR QL STRIP.AUTO: NEGATIVE
LYMPHOCYTES # BLD AUTO: 2.36 10*3/MM3 (ref 0.7–3.1)
LYMPHOCYTES NFR BLD AUTO: 35.9 % (ref 19.6–45.3)
MAGNESIUM SERPL-MCNC: 2 MG/DL (ref 1.6–2.6)
MCH RBC QN AUTO: 32.5 PG (ref 26.6–33)
MCHC RBC AUTO-ENTMCNC: 33.3 G/DL (ref 31.5–35.7)
MCV RBC AUTO: 97.6 FL (ref 79–97)
METHADONE UR QL SCN: NEGATIVE
MONOCYTES # BLD AUTO: 0.41 10*3/MM3 (ref 0.1–0.9)
MONOCYTES NFR BLD AUTO: 6.2 % (ref 5–12)
NEUTROPHILS NFR BLD AUTO: 3.41 10*3/MM3 (ref 1.7–7)
NEUTROPHILS NFR BLD AUTO: 51.8 % (ref 42.7–76)
NITRITE UR QL STRIP: NEGATIVE
NRBC BLD AUTO-RTO: 0 /100 WBC (ref 0–0.2)
OPIATES UR QL: NEGATIVE
OXYCODONE UR QL SCN: NEGATIVE
PCP UR QL SCN: NEGATIVE
PH UR STRIP.AUTO: 5.5 [PH] (ref 5–8)
PLATELET # BLD AUTO: 185 10*3/MM3 (ref 140–450)
PMV BLD AUTO: 9.3 FL (ref 6–12)
POTASSIUM SERPL-SCNC: 3.8 MMOL/L (ref 3.5–5.2)
PROPOXYPH UR QL: NEGATIVE
PROT SERPL-MCNC: 6.6 G/DL (ref 6–8.5)
PROT UR QL STRIP: NEGATIVE
RBC # BLD AUTO: 4.55 10*6/MM3 (ref 4.14–5.8)
SARS-COV-2 RNA RESP QL NAA+PROBE: NOT DETECTED
SODIUM SERPL-SCNC: 140 MMOL/L (ref 136–145)
SP GR UR STRIP: 1.01 (ref 1–1.03)
TRICYCLICS UR QL SCN: POSITIVE
UROBILINOGEN UR QL STRIP: NORMAL
WBC NRBC COR # BLD: 6.58 10*3/MM3 (ref 3.4–10.8)

## 2022-03-23 PROCEDURE — 80053 COMPREHEN METABOLIC PANEL: CPT | Performed by: STUDENT IN AN ORGANIZED HEALTH CARE EDUCATION/TRAINING PROGRAM

## 2022-03-23 PROCEDURE — 83735 ASSAY OF MAGNESIUM: CPT | Performed by: STUDENT IN AN ORGANIZED HEALTH CARE EDUCATION/TRAINING PROGRAM

## 2022-03-23 PROCEDURE — 82077 ASSAY SPEC XCP UR&BREATH IA: CPT | Performed by: STUDENT IN AN ORGANIZED HEALTH CARE EDUCATION/TRAINING PROGRAM

## 2022-03-23 PROCEDURE — 99284 EMERGENCY DEPT VISIT MOD MDM: CPT

## 2022-03-23 PROCEDURE — 87636 SARSCOV2 & INF A&B AMP PRB: CPT | Performed by: STUDENT IN AN ORGANIZED HEALTH CARE EDUCATION/TRAINING PROGRAM

## 2022-03-23 PROCEDURE — 85025 COMPLETE CBC W/AUTO DIFF WBC: CPT | Performed by: STUDENT IN AN ORGANIZED HEALTH CARE EDUCATION/TRAINING PROGRAM

## 2022-03-23 PROCEDURE — 36415 COLL VENOUS BLD VENIPUNCTURE: CPT

## 2022-03-23 PROCEDURE — 81003 URINALYSIS AUTO W/O SCOPE: CPT | Performed by: STUDENT IN AN ORGANIZED HEALTH CARE EDUCATION/TRAINING PROGRAM

## 2022-03-23 PROCEDURE — 80306 DRUG TEST PRSMV INSTRMNT: CPT | Performed by: STUDENT IN AN ORGANIZED HEALTH CARE EDUCATION/TRAINING PROGRAM

## 2022-03-23 PROCEDURE — C9803 HOPD COVID-19 SPEC COLLECT: HCPCS

## 2022-03-23 NOTE — NURSING NOTE
Etoh 202. Patient states he may be a little off. But does not wish to stay.     Called and spoke to Dr. Ballard via phone. Instructed that with him just relapsing a few days ago that he probably does not need a detox medically and should do his outpt treatment. Instructed to have his mom call the iop if he does not want to do any rehab. And to dc in care of his mother. [id number]    Patients mother called and made aware. Outpatient and IOP material given to patient. Patient will be dc in care of the mother.

## 2022-03-23 NOTE — NURSING NOTE
Patient approached intake desk and asked if he can just have this things and leave. Instructed him that at this point he would need to at least wait until his alcohol level was back and then if he wants to leave he can and that I would call the on call doctor and let him know.

## 2022-03-23 NOTE — NURSING NOTE
Mother called via phone wanting to know if she can come say good bye to son so she could leave. Asked her to wait until staff has talked to the on call provider and we would let her know if he is admitted or not. Information verbalized.

## 2022-03-23 NOTE — NURSING NOTE
Patient presents to intake and states that he called his mom and had her bring him back in today. He states that he was here in February for alcohol detox. Got out. Went home and stayed sober two weeks. He states that he relapsed several days ago. His cousin got out of shelter and they just started drinking again. He states only alcohol. No drugs. He states that he wants to detox and then he plans on working with his sponsor. Not interested in rehab. When asked if he feels like he needs a full detox again the patient states that is up to you, I can go to detox or I can go back home and get drunk it is up to you as to if I drink again today. Explained to patient that is not how it works and that I would present his information to the doctor and he would decide if he meets criteria to be admitted. Pt verbalized understanding

## 2022-03-23 NOTE — ED PROVIDER NOTES
Subjective   46-year-old white male presents secondary to need for alcohol detox.  Patient states he is drinking about a pint to a half 1/5/day.  He denies any suicidal homicidal ideation.  He denies any new medical complaints.  No exposure to Covid or Covid symptoms.  Symptoms mild to moderate.          Review of Systems   Constitutional: Negative.  Negative for fever.   HENT: Negative.    Respiratory: Negative.    Cardiovascular: Negative.  Negative for chest pain.   Gastrointestinal: Negative.  Negative for abdominal pain.   Endocrine: Negative.    Genitourinary: Negative.  Negative for dysuria.   Skin: Negative.    Neurological: Negative.    Psychiatric/Behavioral: Negative.  Negative for suicidal ideas.   All other systems reviewed and are negative.      Past Medical History:   Diagnosis Date   • Alcohol abuse    • Anxiety    • Arthritis    • Back pain, chronic    • Carpal tunnel syndrome    • Depression    • Diverticulosis    • Fibromyalgia    • Fractures    • GERD (gastroesophageal reflux disease)    • Hiatal hernia    • History of blood transfusion    • Hypertension     pt states no longer has it, was from anxiety attacks.    • Migraine    • Shoulder injury    • Substance abuse ([facility])    • Tendonitis    • Tobacco abuse    • Traumatic brain injury ([facility]) 1999       Allergies   Allergen Reactions   • Shellfish-Derived Products Arrhythmia   • Sulfa Antibiotics Swelling   • Amitriptyline Mental Status Change and Irritability     Made him mean     • Duloxetine Hcl Urinary Retention     \   • Penicillins Rash   • Venlafaxine Nausea And Vomiting       Past Surgical History:   Procedure Laterality Date   • COLONOSCOPY  2019   • EYE SURGERY Left 02/1999    reconstruction   • EYE SURGERY      reconstruction of left eye    • SPINAL CORD STIMULATOR IMPLANT N/A 5/16/2017    Procedure: INTRATHECAL CATHETER PLACEMENT ;  Surgeon: Aristides Zafar MD;  Location: [facility];  Service:    • TRACHEOSTOMY     • WISDOM TOOTH  EXTRACTION         Family History   Problem Relation Age of Onset   • Arthritis Mother    • Other Mother         Malignant neoplasm   • Arthritis Other    • Other Other         Malignant neoplasm   • Hypertension Other    • Alcohol abuse Father    • Arthritis Other    • Cancer Other        Social History     Socioeconomic History   • Marital status:    Tobacco Use   • Smoking status: Former Smoker     Packs/day: 1.00     Years: 20.00     Pack years: 20.00     Types: Electronic Cigarette, Cigarettes   • Smokeless tobacco: Former User   • Tobacco comment: quit in 2021   Vaping Use   • Vaping Use: Every day   • Substances: Nicotine   • Devices: Refillable tank   Substance and Sexual Activity   • Alcohol use: Yes     Comment: see below   • Drug use: Yes     Types: Marijuana     Comment: alcohol   • Sexual activity: Not Currently           Objective   Physical Exam  Vitals and nursing note reviewed.   Constitutional:       General: He is not in acute distress.     Appearance: He is well-developed. He is not diaphoretic.   HENT:      Head: Normocephalic and atraumatic.      Right Ear: External ear normal.      Left Ear: External ear normal.      Nose: Nose normal.   Eyes:      Conjunctiva/sclera: Conjunctivae normal.      Pupils: Pupils are equal, round, and reactive to light.   Neck:      Vascular: No JVD.      Trachea: No tracheal deviation.   Cardiovascular:      Rate and Rhythm: Normal rate and regular rhythm.      Heart sounds: Normal heart sounds. No murmur heard.  Pulmonary:      Effort: Pulmonary effort is normal. No respiratory distress.      Breath sounds: Normal breath sounds. No wheezing.   Abdominal:      General: Bowel sounds are normal.      Palpations: Abdomen is soft.      Tenderness: There is no abdominal tenderness.   Musculoskeletal:         General: No deformity. Normal range of motion.      Cervical back: Normal range of motion and neck supple.   Skin:     General: Skin is warm and dry.       Coloration: Skin is not pale.      Findings: No erythema or rash.   Neurological:      Mental Status: He is alert and oriented to person, place, and time.      Cranial Nerves: No cranial nerve deficit.   Psychiatric:         Behavior: Behavior normal.         Thought Content: Thought content normal.         Procedures           ED Course                                                 MDM  Number of Diagnoses or Management Options  Alcohol abuse: established and worsening     Amount and/or Complexity of Data Reviewed  Clinical lab tests: reviewed and ordered        Final diagnoses:   Alcohol abuse       ED Disposition  ED Disposition     ED Disposition   Discharge    Condition   Stable    Comment   --             Nataliya Diggs, APRN  [address] [facility] [address]  [phone]    Schedule an appointment as soon as possible for a visit       [facility] [address] [address] [phone]  Schedule an appointment as soon as possible for a visit            Medication List      No changes were made to your prescriptions during this visit.          Bryson Le PA  03/23/22 [id number]

## 2022-03-25 ENCOUNTER — TELEMEDICINE (OUTPATIENT)
Dept: FAMILY MEDICINE CLINIC | Age: 47
End: 2022-03-25
Payer: COMMERCIAL

## 2022-03-25 DIAGNOSIS — H81.01 MENIERE'S DISEASE OF RIGHT EAR: Primary | ICD-10-CM

## 2022-03-25 PROCEDURE — 99213 OFFICE O/P EST LOW 20 MIN: CPT | Performed by: NURSE PRACTITIONER

## 2022-03-25 RX ORDER — PREDNISONE 10 MG/1
10 TABLET ORAL SEE ADMIN INSTRUCTIONS
Qty: 21 TABLET | Refills: 0 | Status: SHIPPED | OUTPATIENT
Start: 2022-03-25 | End: 2022-03-31

## 2022-03-25 ASSESSMENT — ENCOUNTER SYMPTOMS
NAUSEA: 1
CONSTIPATION: 0
COUGH: 0
VOMITING: 0
SHORTNESS OF BREATH: 0
DIARRHEA: 0
PHOTOPHOBIA: 1
WHEEZING: 0
BACK PAIN: 0

## 2022-03-25 NOTE — PROGRESS NOTES
3/25/2022    TELEHEALTH EVALUATION -- Audio/Visual (During XIJIX-81 public health emergency)    Chief Complaint   Patient presents with    Otalgia     [address] (:  1975) has requested an audio/video evaluation for the following concern(s):    HPI:  Video visit today for continued flare of Meniere's. Tried scop patch without success. Would like to repeat steroids again. Last day of work was 3/9/22. Scheduled to see Dr. Venda Mohs at [facility] on May 31st. Is on the cancellation list.     Review of Systems   Constitutional: Positive for activity change (decreased). Negative for appetite change, chills, diaphoresis, fever and unexpected weight change. HENT: Positive for ear pain and tinnitus. Negative for ear discharge. Eyes: Positive for photophobia. Respiratory: Negative for cough, shortness of breath and wheezing. Cardiovascular: Negative for chest pain and palpitations. Gastrointestinal: Positive for nausea. Negative for constipation, diarrhea and vomiting. Genitourinary: Negative for difficulty urinating. Musculoskeletal: Negative for arthralgias, back pain and neck pain. Neurological: Positive for dizziness and headaches. Negative for weakness. Psychiatric/Behavioral: Negative for dysphoric mood and sleep disturbance. The patient is not nervous/anxious. Prior to Visit Medications    Medication Sig Taking?  Authorizing Provider   predniSONE (DELTASONE) 10 MG tablet Take 1 tablet by mouth See Admin Instructions for 6 days Taper 6,5,4,3,2,1 Yes Chuyita Whitfield APRN - CNP   scopolamine (TRANSDERM SCOP, 1.5 MG,) transdermal patch Place 1 patch onto the skin every 72 hours Yes Freya Stark, APRN - CNP   albuterol sulfate HFA (VENTOLIN HFA) 108 (90 Base) MCG/ACT inhaler Inhale 2 puffs into the lungs 4 times daily as needed for Wheezing or Shortness of Breath Yes Freay Stark APRN - CNP   escitalopram (LEXAPRO) 20 MG tablet Take 1 tablet by mouth daily Yes Freya Stark APRN - CNP   ibuprofen (ADVIL;MOTRIN) 800 MG tablet Take 1 tablet by mouth every 8 hours as needed for Pain Yes Ann Marie Lombardi APRN - CNP   lamoTRIgine (LAMICTAL) 25 MG tablet Take 100 mg by mouth daily  Yes Historical Provider, MD   busPIRone (BUSPAR) 10 MG tablet Take 1 tablet by mouth 3 times daily Yes Missy Bland APRN - CNP   SUMAtriptan (IMITREX) 100 MG tablet  Yes Historical Provider, MD   meclizine (ANTIVERT) 25 MG tablet Take 1 tablet by mouth 3 times daily as needed for Dizziness Yes Yolie Rico, DO       Social History     Tobacco Use    Smoking status: Former Smoker     Quit date: 1/1/2012     Years since quitting: 10.2    Smokeless tobacco: Never Used    Tobacco comment: vape once in a while- zero nicotine use   Vaping Use    Vaping Use: Never used   Substance Use Topics    Alcohol use: No    Drug use: No          PHYSICAL EXAMINATION:  [ INSTRUCTIONS:  \"[x]\" Indicates a positive item  \"[]\" Indicates a negative item  -- DELETE ALL ITEMS NOT EXAMINED]  Vital Signs: (As obtained by patient/caregiver or practitioner observation)    Blood pressure-  Heart rate-    Respiratory rate-    Temperature-  Pulse oximetry-     Constitutional: [x] Appears well-developed and well-nourished [] No apparent distress      [x] Abnormal- photosensitivity  Mental status  [x] Alert and awake  [x] Oriented to person/place/time [x]Able to follow commands      Eyes:  EOM    [x]  Normal  [] Abnormal-  Sclera  [x]  Normal  [] Abnormal -         Discharge [x]  None visible  [] Abnormal -    HENT:   [x] Normocephalic, atraumatic.   [] Abnormal   [] Mouth/Throat: Mucous membranes are moist.     External Ears [x] Normal  [] Abnormal-     Neck: [x] No visualized mass     Pulmonary/Chest: [x] Respiratory effort normal.  [x] No visualized signs of difficulty breathing or respiratory distress        [] Abnormal-      Musculoskeletal:   [] Normal gait with no signs of ataxia         [x] Normal range of motion of neck        [] Abnormal-       Neurological:        [x] No Facial Asymmetry (Cranial nerve 7 motor function) (limited exam to video visit)          [x] No gaze palsy        [] Abnormal-         Skin:        [x] No significant exanthematous lesions or discoloration noted on facial skin         [] Abnormal-            Psychiatric:       [x] Normal Affect [x] No Hallucinations        [] Abnormal-     Other pertinent observable physical exam findings-     ASSESSMENT/PLAN:  1. Meniere's disease of right ear  - predniSONE (DELTASONE) 10 MG tablet; Take 1 tablet by mouth See Admin Instructions for 6 days Taper 6,5,4,3,2,1  Dispense: 21 tablet; Refill: 0  -  Reviewed side effects of medication and patient verbalizes understanding.   -  Advised to call back directly if there are further questions, or if these symptoms fail to improve as anticipated or worsen. -  Contact specialist if no improvement from steroids for further treatment. No follow-ups on file. Eliceo Gibson, was evaluated through a synchronous (real-time) audio-video encounter. The patient (or guardian if applicable) is aware that this is a billable service, which includes applicable co-pays. This Virtual Visit was conducted with patient's (and/or legal guardian's) consent. The visit was conducted pursuant to the emergency declaration under the [address] [place] [address], [address] [place] [address] authority and the [employer] and [employer] General Act. Patient identification was verified, and a caregiver was present when appropriate. The patient was located at home in a state where the provider was licensed to provide care. Not billed by time. --ADAN Mary CNP on 3/25/2022 at 1:09 PM    An electronic signature was used to authenticate this note.

## 2022-03-28 ENCOUNTER — TELEPHONE (OUTPATIENT)
Dept: FAMILY MEDICINE CLINIC | Age: 47
End: 2022-03-28

## 2022-03-28 NOTE — TELEPHONE ENCOUNTER
Left message paperwork is complete just waiting for signature from [facility] tomorrow.

## 2022-03-28 NOTE — TELEPHONE ENCOUNTER
----- Message from Zack Saleem sent at 3/28/2022 10:32 AM EDT -----  Subject: Message to Provider    QUESTIONS  Information for Provider? pt called to make sure his short term disability   paperwork was sent to his insurance company. please call pt to confirm. ---------------------------------------------------------------------------  --------------  Nina Sandra INFO  What is the best way for the office to contact you? OK to leave message on   voicemail  Preferred Call Back Phone Number? [phone]  ---------------------------------------------------------------------------  --------------  SCRIPT ANSWERS  Relationship to Patient?  Self

## 2022-03-29 ENCOUNTER — TELEPHONE (OUTPATIENT)
Dept: FAMILY MEDICINE CLINIC | Age: 47
End: 2022-03-29

## 2022-04-06 ENCOUNTER — APPOINTMENT (OUTPATIENT)
Dept: GENERAL RADIOLOGY | Facility: HOSPITAL | Age: 47
End: 2022-04-06

## 2022-04-06 ENCOUNTER — HOSPITAL ENCOUNTER (EMERGENCY)
Facility: HOSPITAL | Age: 47
Discharge: LEFT BEFORE DISCHARGE | End: 2022-04-06
Attending: EMERGENCY MEDICINE
Payer: MEDICARE

## 2022-04-06 ENCOUNTER — APPOINTMENT (OUTPATIENT)
Dept: CT IMAGING | Facility: HOSPITAL | Age: 47
End: 2022-04-06

## 2022-04-06 ENCOUNTER — APPOINTMENT (OUTPATIENT)
Dept: GENERAL RADIOLOGY | Facility: HOSPITAL | Age: 47
End: 2022-04-06
Payer: MEDICARE

## 2022-04-06 ENCOUNTER — HOSPITAL ENCOUNTER (EMERGENCY)
Facility: HOSPITAL | Age: 47
Discharge: HOME | End: 2022-04-06
Attending: EMERGENCY MEDICINE
Payer: MEDICARE

## 2022-04-06 ENCOUNTER — HOSPITAL ENCOUNTER (EMERGENCY)
Facility: HOSPITAL | Age: 47
Discharge: HOME | End: 2022-04-06
Attending: EMERGENCY MEDICINE | Admitting: EMERGENCY MEDICINE

## 2022-04-06 ENCOUNTER — APPOINTMENT (OUTPATIENT)
Dept: CT IMAGING | Facility: HOSPITAL | Age: 47
End: 2022-04-06
Payer: MEDICARE

## 2022-04-06 VITALS
BODY MASS INDEX: 25.05 KG/M2 | TEMPERATURE: 98.4 F | HEART RATE: 100 BPM | HEIGHT: 70 IN | SYSTOLIC BLOOD PRESSURE: 117 MMHG | OXYGEN SATURATION: 98 % | WEIGHT: 175 LBS | DIASTOLIC BLOOD PRESSURE: 89 MMHG | RESPIRATION RATE: 18 BRPM

## 2022-04-06 VITALS
HEART RATE: 78 BPM | DIASTOLIC BLOOD PRESSURE: 91 MMHG | TEMPERATURE: 98.4 F | OXYGEN SATURATION: 97 % | RESPIRATION RATE: 19 BRPM | SYSTOLIC BLOOD PRESSURE: 125 MMHG

## 2022-04-06 VITALS
OXYGEN SATURATION: 96 % | HEIGHT: 72 IN | TEMPERATURE: 98 F | BODY MASS INDEX: 22.35 KG/M2 | HEART RATE: 117 BPM | SYSTOLIC BLOOD PRESSURE: 152 MMHG | DIASTOLIC BLOOD PRESSURE: 108 MMHG | RESPIRATION RATE: 16 BRPM | WEIGHT: 165 LBS

## 2022-04-06 DIAGNOSIS — I15.9 SECONDARY HYPERTENSION: ICD-10-CM

## 2022-04-06 DIAGNOSIS — Z91.199 MEDICALLY NONCOMPLIANT: ICD-10-CM

## 2022-04-06 DIAGNOSIS — R07.9 CHEST PAIN, UNSPECIFIED TYPE: Primary | ICD-10-CM

## 2022-04-06 DIAGNOSIS — R07.89 CHEST WALL PAIN: Primary | ICD-10-CM

## 2022-04-06 DIAGNOSIS — R79.89 ELEVATED D-DIMER: ICD-10-CM

## 2022-04-06 LAB
A/G RATIO: 1.7 (ref 0.8–2)
ALBUMIN SERPL-MCNC: 4.1 G/DL (ref 3.4–4.8)
ALBUMIN SERPL-MCNC: 4.5 G/DL (ref 3.5–5.2)
ALBUMIN/GLOB SERPL: 1.9 G/DL
ALP BLD-CCNC: 115 U/L (ref 25–100)
ALP SERPL-CCNC: 124 U/L (ref 39–117)
ALT SERPL W P-5'-P-CCNC: 87 U/L (ref 1–41)
ALT SERPL-CCNC: 75 U/L (ref 4–36)
AMPHET+METHAMPHET UR QL: POSITIVE
AMPHETAMINES UR QL: POSITIVE
ANION GAP SERPL CALCULATED.3IONS-SCNC: 17 MMOL/L (ref 3–16)
ANION GAP SERPL CALCULATED.3IONS-SCNC: 26.9 MMOL/L (ref 5–15)
AST SERPL-CCNC: 53 U/L (ref 8–33)
AST SERPL-CCNC: 62 U/L (ref 1–40)
BARBITURATES UR QL SCN: NEGATIVE
BASOPHILS # BLD AUTO: 0.12 10*3/MM3 (ref 0–0.2)
BASOPHILS ABSOLUTE: 0.1 K/UL (ref 0–0.1)
BASOPHILS NFR BLD AUTO: 1.2 % (ref 0–1.5)
BASOPHILS RELATIVE PERCENT: 1.4 %
BENZODIAZ UR QL SCN: NEGATIVE
BILIRUB SERPL-MCNC: 1.2 MG/DL (ref 0–1.2)
BILIRUB SERPL-MCNC: 1.5 MG/DL (ref 0.3–1.2)
BILIRUB UR QL STRIP: NEGATIVE
BUN BLDV-MCNC: 6 MG/DL (ref 6–20)
BUN SERPL-MCNC: 6 MG/DL (ref 6–20)
BUN/CREAT SERPL: 7.2 (ref 7–25)
BUPRENORPHINE SERPL-MCNC: POSITIVE NG/ML
CALCIUM SERPL-MCNC: 9.2 MG/DL (ref 8.5–10.5)
CALCIUM SPEC-SCNC: 9.9 MG/DL (ref 8.6–10.5)
CANNABINOIDS SERPL QL: NEGATIVE
CHLORIDE BLD-SCNC: 98 MMOL/L (ref 98–107)
CHLORIDE SERPL-SCNC: 100 MMOL/L (ref 98–107)
CLARITY UR: CLEAR
CO2 SERPL-SCNC: 13.1 MMOL/L (ref 22–29)
CO2: 20 MMOL/L (ref 20–30)
COCAINE UR QL: NEGATIVE
COLOR UR: YELLOW
CREAT SERPL-MCNC: 0.8 MG/DL (ref 0.4–1.2)
CREAT SERPL-MCNC: 0.83 MG/DL (ref 0.76–1.27)
D DIMER: 0.78 UG/ML FEU (ref 0–0.6)
DEPRECATED RDW RBC AUTO: 52.5 FL (ref 37–54)
EGFRCR SERPLBLD CKD-EPI 2021: 110.7 ML/MIN/1.73
EOSINOPHIL # BLD AUTO: 0.02 10*3/MM3 (ref 0–0.4)
EOSINOPHIL NFR BLD AUTO: 0.2 % (ref 0.3–6.2)
EOSINOPHILS ABSOLUTE: 0 K/UL (ref 0–0.4)
EOSINOPHILS RELATIVE PERCENT: 0.3 %
ERYTHROCYTE [DISTWIDTH] IN BLOOD BY AUTOMATED COUNT: 15.2 % (ref 12.3–15.4)
GFR AFRICAN AMERICAN: >59
GFR NON-AFRICAN AMERICAN: >59
GLOBULIN UR ELPH-MCNC: 2.4 GM/DL
GLOBULIN: 2.4 G/DL
GLUCOSE BLD-MCNC: 116 MG/DL (ref 74–106)
GLUCOSE SERPL-MCNC: 86 MG/DL (ref 65–99)
GLUCOSE UR STRIP-MCNC: NEGATIVE MG/DL
HCT VFR BLD AUTO: 44.6 % (ref 37.5–51)
HCT VFR BLD CALC: 43.3 % (ref 40–54)
HEMOGLOBIN: 15.1 G/DL (ref 13–18)
HGB BLD-MCNC: 16.2 G/DL (ref 13–17.7)
HGB UR QL STRIP.AUTO: NEGATIVE
HOLD SPECIMEN: NORMAL
HOLD SPECIMEN: NORMAL
IMM GRANULOCYTES # BLD AUTO: 0.03 10*3/MM3 (ref 0–0.05)
IMM GRANULOCYTES NFR BLD AUTO: 0.3 % (ref 0–0.5)
IMMATURE GRANULOCYTES #: 0 K/UL
IMMATURE GRANULOCYTES %: 0.3 % (ref 0–5)
KETONES UR QL STRIP: ABNORMAL
LEUKOCYTE ESTERASE UR QL STRIP.AUTO: NEGATIVE
LYMPHOCYTES # BLD AUTO: 1.94 10*3/MM3 (ref 0.7–3.1)
LYMPHOCYTES ABSOLUTE: 1 K/UL (ref 1.5–4)
LYMPHOCYTES NFR BLD AUTO: 18.8 % (ref 19.6–45.3)
LYMPHOCYTES RELATIVE PERCENT: 14.9 %
MCH RBC QN AUTO: 33.4 PG (ref 27–32)
MCH RBC QN AUTO: 33.8 PG (ref 26.6–33)
MCHC RBC AUTO-ENTMCNC: 34.9 G/DL (ref 31–35)
MCHC RBC AUTO-ENTMCNC: 36.3 G/DL (ref 31.5–35.7)
MCV RBC AUTO: 93.1 FL (ref 79–97)
MCV RBC AUTO: 95.8 FL (ref 80–100)
METHADONE UR QL SCN: NEGATIVE
MONOCYTES # BLD AUTO: 0.71 10*3/MM3 (ref 0.1–0.9)
MONOCYTES ABSOLUTE: 0.5 K/UL (ref 0.2–0.8)
MONOCYTES NFR BLD AUTO: 6.9 % (ref 5–12)
MONOCYTES RELATIVE PERCENT: 7.7 %
NEUTROPHILS ABSOLUTE: 5.2 K/UL (ref 2–7.5)
NEUTROPHILS NFR BLD AUTO: 7.5 10*3/MM3 (ref 1.7–7)
NEUTROPHILS NFR BLD AUTO: 72.6 % (ref 42.7–76)
NEUTROPHILS RELATIVE PERCENT: 75.4 %
NITRITE UR QL STRIP: NEGATIVE
NRBC BLD AUTO-RTO: 0 /100 WBC (ref 0–0.2)
OPIATES UR QL: NEGATIVE
OXYCODONE UR QL SCN: NEGATIVE
PCP UR QL SCN: NEGATIVE
PDW BLD-RTO: 15.5 % (ref 11–16)
PH UR STRIP.AUTO: <=5 [PH] (ref 5–8)
PLATELET # BLD AUTO: 418 10*3/MM3 (ref 140–450)
PLATELET # BLD: 336 K/UL (ref 150–400)
PMV BLD AUTO: 8.8 FL (ref 6–10)
PMV BLD AUTO: 8.9 FL (ref 6–12)
POTASSIUM REFLEX MAGNESIUM: 3.9 MMOL/L (ref 3.4–5.1)
POTASSIUM SERPL-SCNC: 3.9 MMOL/L (ref 3.5–5.2)
PROPOXYPH UR QL: NEGATIVE
PROT SERPL-MCNC: 6.9 G/DL (ref 6–8.5)
PROT UR QL STRIP: NEGATIVE
RBC # BLD AUTO: 4.79 10*6/MM3 (ref 4.14–5.8)
RBC # BLD: 4.52 M/UL (ref 4.5–6)
SODIUM BLD-SCNC: 135 MMOL/L (ref 136–145)
SODIUM SERPL-SCNC: 140 MMOL/L (ref 136–145)
SP GR UR STRIP: 1.01 (ref 1–1.03)
TOTAL PROTEIN: 6.5 G/DL (ref 6.4–8.3)
TRICYCLICS UR QL SCN: NEGATIVE
TROPONIN T SERPL-MCNC: <0.01 NG/ML (ref 0–0.03)
TROPONIN: <0.3 NG/ML
UROBILINOGEN UR QL STRIP: ABNORMAL
WBC # BLD: 6.9 K/UL (ref 4–11)
WBC NRBC COR # BLD: 10.32 10*3/MM3 (ref 3.4–10.8)
WHOLE BLOOD HOLD SPECIMEN: NORMAL
WHOLE BLOOD HOLD SPECIMEN: NORMAL

## 2022-04-06 PROCEDURE — 80053 COMPREHEN METABOLIC PANEL: CPT

## 2022-04-06 PROCEDURE — 99283 EMERGENCY DEPT VISIT LOW MDM: CPT

## 2022-04-06 PROCEDURE — 80053 COMPREHEN METABOLIC PANEL: CPT | Performed by: NURSE PRACTITIONER

## 2022-04-06 PROCEDURE — 93005 ELECTROCARDIOGRAM TRACING: CPT

## 2022-04-06 PROCEDURE — 85025 COMPLETE CBC W/AUTO DIFF WBC: CPT | Performed by: NURSE PRACTITIONER

## 2022-04-06 PROCEDURE — 80306 DRUG TEST PRSMV INSTRMNT: CPT | Performed by: NURSE PRACTITIONER

## 2022-04-06 PROCEDURE — 36415 COLL VENOUS BLD VENIPUNCTURE: CPT

## 2022-04-06 PROCEDURE — 74177 CT ABD & PELVIS W/CONTRAST: CPT

## 2022-04-06 PROCEDURE — 84484 ASSAY OF TROPONIN QUANT: CPT | Performed by: NURSE PRACTITIONER

## 2022-04-06 PROCEDURE — 99284 EMERGENCY DEPT VISIT MOD MDM: CPT

## 2022-04-06 PROCEDURE — 25010000002 IOPAMIDOL 61 % SOLUTION: Performed by: EMERGENCY MEDICINE

## 2022-04-06 PROCEDURE — 81003 URINALYSIS AUTO W/O SCOPE: CPT | Performed by: NURSE PRACTITIONER

## 2022-04-06 PROCEDURE — 85025 COMPLETE CBC W/AUTO DIFF WBC: CPT

## 2022-04-06 PROCEDURE — 96372 THER/PROPH/DIAG INJ SC/IM: CPT

## 2022-04-06 PROCEDURE — 6360000002 HC RX W HCPCS: Performed by: EMERGENCY MEDICINE

## 2022-04-06 PROCEDURE — 25010000002 MORPHINE PER 10 MG: Performed by: NURSE PRACTITIONER

## 2022-04-06 PROCEDURE — 85379 FIBRIN DEGRADATION QUANT: CPT

## 2022-04-06 PROCEDURE — 84484 ASSAY OF TROPONIN QUANT: CPT

## 2022-04-06 PROCEDURE — 93005 ELECTROCARDIOGRAM TRACING: CPT | Performed by: NURSE PRACTITIONER

## 2022-04-06 PROCEDURE — 71045 X-RAY EXAM CHEST 1 VIEW: CPT

## 2022-04-06 PROCEDURE — 96374 THER/PROPH/DIAG INJ IV PUSH: CPT

## 2022-04-06 RX ORDER — METHYLPREDNISOLONE SODIUM SUCCINATE 125 MG/2ML
125 INJECTION, POWDER, LYOPHILIZED, FOR SOLUTION INTRAMUSCULAR; INTRAVENOUS ONCE
Status: DISCONTINUED | OUTPATIENT
Start: 2022-04-06 | End: 2022-04-06

## 2022-04-06 RX ORDER — SODIUM CHLORIDE 0.9 % (FLUSH) 0.9 %
10 SYRINGE (ML) INJECTION AS NEEDED
Status: DISCONTINUED | OUTPATIENT
Start: 2022-04-06 | End: 2022-04-06 | Stop reason: HOSPADM

## 2022-04-06 RX ORDER — ASPIRIN 81 MG/1
324 TABLET, CHEWABLE ORAL DAILY
Status: DISCONTINUED | OUTPATIENT
Start: 2022-04-06 | End: 2022-04-06 | Stop reason: HOSPADM

## 2022-04-06 RX ORDER — DIPHENHYDRAMINE HYDROCHLORIDE 50 MG/ML
25 INJECTION INTRAMUSCULAR; INTRAVENOUS ONCE
Status: DISCONTINUED | OUTPATIENT
Start: 2022-04-06 | End: 2022-04-06

## 2022-04-06 RX ORDER — MORPHINE SULFATE 4 MG/ML
4 INJECTION, SOLUTION INTRAMUSCULAR; INTRAVENOUS ONCE
Status: COMPLETED | OUTPATIENT
Start: 2022-04-06 | End: 2022-04-06

## 2022-04-06 RX ORDER — ASPIRIN 81 MG/1
324 TABLET, CHEWABLE ORAL ONCE
Status: DISCONTINUED | OUTPATIENT
Start: 2022-04-06 | End: 2022-04-06 | Stop reason: HOSPADM

## 2022-04-06 RX ORDER — HYDROXYZINE PAMOATE 50 MG/1
50 CAPSULE ORAL ONCE
Status: COMPLETED | OUTPATIENT
Start: 2022-04-06 | End: 2022-04-06

## 2022-04-06 RX ADMIN — IOPAMIDOL 100 ML: 612 INJECTION, SOLUTION INTRAVENOUS at 14:10

## 2022-04-06 RX ADMIN — MORPHINE SULFATE 4 MG: 4 INJECTION, SOLUTION INTRAMUSCULAR; INTRAVENOUS at 14:00

## 2022-04-06 RX ADMIN — ENOXAPARIN SODIUM 80 MG: 100 INJECTION SUBCUTANEOUS at 21:10

## 2022-04-06 RX ADMIN — HYDROXYZINE PAMOATE 50 MG: 50 CAPSULE ORAL at 14:38

## 2022-04-06 ASSESSMENT — PAIN - FUNCTIONAL ASSESSMENT: PAIN_FUNCTIONAL_ASSESSMENT: 0-10

## 2022-04-06 ASSESSMENT — PAIN DESCRIPTION - ORIENTATION: ORIENTATION: MID

## 2022-04-06 ASSESSMENT — PAIN DESCRIPTION - FREQUENCY
FREQUENCY: INTERMITTENT
FREQUENCY: CONTINUOUS

## 2022-04-06 ASSESSMENT — PAIN SCALES - GENERAL
PAINLEVEL_OUTOF10: 7
PAINLEVEL_OUTOF10: 3

## 2022-04-06 ASSESSMENT — PAIN DESCRIPTION - LOCATION
LOCATION: CHEST
LOCATION: CHEST

## 2022-04-06 ASSESSMENT — PAIN DESCRIPTION - PAIN TYPE: TYPE: ACUTE PAIN

## 2022-04-06 ASSESSMENT — PAIN DESCRIPTION - DESCRIPTORS
DESCRIPTORS: ACHING
DESCRIPTORS: PRESSURE
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
## 2022-04-06 NOTE — ED TRIAGE NOTES
Pt arrives to ED POV with complaints of mid-sternal chest pain rated a 7 on a 1-10 scale. Pt states this is his 3rd ER visit for same symptoms today. Pt seen in this ED this AM after admitting to using meth and then having chest pain. However patient signed out AMA, pt then went to [facility] ER and was treated for chest pain and was told he was having an anxiety attack per pt. Pt presents to this ED again for chest pain.

## 2022-04-06 NOTE — ED TRIAGE NOTES
Pt stated a friend came by and he used meth for the first time today. Started sweating, pressure in the chest and got on line to see what his symptoms were.
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
## 2022-04-06 NOTE — ED PROVIDER NOTES
Subjective   Chief Complaint: Chest pain    History of Present Illness: This is a 46-year-old male patient comes into the ED today complaining of chest pain started early this morning.  Patient describes it as pressure in his left chest substernally.  Patient states it does not radiate to his jaw or chest, has had some nausea.  Patient unaware of any family history of heart disease.  Patient states that pressure has gotten worse throughout the day and has not been able to do any of his daily functions.    Nurses Notes reviewed and agree, including vitals, allergies, social history and prior medical history.                Review of Systems    Past Medical History:   Diagnosis Date   • Alcohol abuse    • Anxiety    • Arthritis    • Back pain, chronic    • Carpal tunnel syndrome    • Depression    • Diverticulosis    • Fibromyalgia    • Fractures    • GERD (gastroesophageal reflux disease)    • Hiatal hernia    • History of blood transfusion    • Hypertension     pt states no longer has it, was from anxiety attacks.    • Migraine    • Shoulder injury    • Substance abuse ([facility])    • Tendonitis    • Tobacco abuse    • Traumatic brain injury ([facility]) 1999       Allergies   Allergen Reactions   • Shellfish-Derived Products Arrhythmia   • Sulfa Antibiotics Swelling   • Amitriptyline Mental Status Change and Irritability     Made him mean     • Duloxetine Hcl Urinary Retention     \   • Penicillins Rash   • Venlafaxine Nausea And Vomiting       Past Surgical History:   Procedure Laterality Date   • COLONOSCOPY  2019   • EYE SURGERY Left 02/1999    reconstruction   • EYE SURGERY      reconstruction of left eye    • SPINAL CORD STIMULATOR IMPLANT N/A 5/16/2017    Procedure: INTRATHECAL CATHETER PLACEMENT ;  Surgeon: Aristides Zafar MD;  Location: [facility];  Service:    • TRACHEOSTOMY     • WISDOM TOOTH EXTRACTION         Family History   Problem Relation Age of Onset   • Arthritis Mother    • Other Mother         Malignant  neoplasm   • Arthritis Other    • Other Other         Malignant neoplasm   • Hypertension Other    • Alcohol abuse Father    • Arthritis Other    • Cancer Other        Social History     Socioeconomic History   • Marital status:    Tobacco Use   • Smoking status: Former Smoker     Packs/day: 1.00     Years: 20.00     Pack years: 20.00     Types: Electronic Cigarette, Cigarettes   • Smokeless tobacco: Former User   • Tobacco comment: quit in 2021   Vaping Use   • Vaping Use: Every day   • Substances: Nicotine   • Devices: Refillable tank   Substance and Sexual Activity   • Alcohol use: Yes     Comment: see below - 1 pint of vodka per day   • Drug use: Yes     Types: Marijuana, Methamphetamines     Comment: alcohol   • Sexual activity: Not Currently           Objective   Physical Exam  Vitals and nursing note reviewed.   Constitutional:       Appearance: Normal appearance.   HENT:      Head: Normocephalic and atraumatic.   Eyes:      Extraocular Movements: Extraocular movements intact.      Pupils: Pupils are equal, round, and reactive to light.   Cardiovascular:      Rate and Rhythm: Normal rate and regular rhythm.      Pulses:           Carotid pulses are 1+ on the right side and 1+ on the left side.       Radial pulses are 1+ on the right side and 1+ on the left side.        Dorsalis pedis pulses are 1+ on the right side and 1+ on the left side.        Posterior tibial pulses are 1+ on the right side and 1+ on the left side.      Heart sounds: Normal heart sounds.   Pulmonary:      Effort: Pulmonary effort is normal.      Breath sounds: Normal breath sounds.   Abdominal:      General: Bowel sounds are normal.      Palpations: Abdomen is soft.   Musculoskeletal:         General: Normal range of motion.      Cervical back: Normal range of motion and neck supple.   Skin:     General: Skin is warm and dry.      Capillary Refill: Capillary refill takes less than 2 seconds.   Neurological:      Mental Status: He  is alert.      GCS: GCS eye subscore is 4. GCS verbal subscore is 5. GCS motor subscore is 6.      Cranial Nerves: Cranial nerves are intact.      Sensory: Sensation is intact.      Motor: Motor function is intact.   Psychiatric:         Attention and Perception: Attention and perception normal.         Mood and Affect: Mood and affect normal.         Speech: Speech normal.         Procedures           ED Course  ED Course as of 04/22/22 0807   Wed Apr 06, 2022   1242 EKG interpreted by me reveals sinus tachycardia with rate of 113 bpm.  There are some signs of right ventricular conduction delay.  This is an atypical appearing EKG. [TB]      ED Course User Index  [TB] Karey Sadler MD                                                 HEART Score (for prediction of 6-week risk of major adverse cardiac event) reviewed and/or performed as part of the patient evaluation and treatment planning process.  The result associated with this review/performance is: 1       MDM    Final diagnoses:   Chest pain, unspecified type       ED Disposition  ED Disposition     ED Disposition   Discharge    Condition   Stable    Comment   --             No follow-up provider specified.       Medication List      No changes were made to your prescriptions during this visit.          Brannon Virk APRN  04/06/22 1431       Brannon Virk APRN  04/22/22 0807       Brannon Virk APRN  04/22/22 0807

## 2022-04-06 NOTE — ED NOTES
Labs obtained and IV access established at this time, pt cooperative with plan of care at this time.       Marshal Wallace RN  04/06/22 8038

## 2022-04-07 ENCOUNTER — HOSPITAL ENCOUNTER (OUTPATIENT)
Dept: CT IMAGING | Facility: HOSPITAL | Age: 47
Discharge: HOME | End: 2022-04-07
Payer: MEDICARE

## 2022-04-07 ENCOUNTER — HOSPITAL ENCOUNTER (OUTPATIENT)
Dept: NURSING | Facility: HOSPITAL | Age: 47
Setting detail: INFUSION SERIES
Discharge: HOME | End: 2022-04-09

## 2022-04-07 DIAGNOSIS — R79.89 ELEVATED D-DIMER: ICD-10-CM

## 2022-04-07 DIAGNOSIS — Z91.041 CONTRAST MEDIA ALLERGY: Primary | ICD-10-CM

## 2022-04-07 DIAGNOSIS — R07.89 OTHER CHEST PAIN: ICD-10-CM

## 2022-04-07 DIAGNOSIS — Z91.041 CONTRAST MEDIA ALLERGY: ICD-10-CM

## 2022-04-07 RX ORDER — DIPHENHYDRAMINE HYDROCHLORIDE 50 MG/ML
25 INJECTION INTRAMUSCULAR; INTRAVENOUS ONCE
Status: DISCONTINUED | OUTPATIENT
Start: 2022-04-07 | End: 2022-04-10 | Stop reason: HOSPADM

## 2022-04-07 RX ORDER — DIPHENHYDRAMINE HYDROCHLORIDE 50 MG/ML
25 INJECTION INTRAMUSCULAR; INTRAVENOUS ONCE
Status: CANCELLED
Start: 2022-04-07 | End: 2022-04-07

## 2022-04-07 RX ORDER — METHYLPREDNISOLONE SODIUM SUCCINATE 40 MG/ML
40 INJECTION, POWDER, LYOPHILIZED, FOR SOLUTION INTRAMUSCULAR; INTRAVENOUS ONCE
Status: CANCELLED
Start: 2022-04-07 | End: 2022-04-07

## 2022-04-07 RX ORDER — METHYLPREDNISOLONE SODIUM SUCCINATE 40 MG/ML
40 INJECTION, POWDER, LYOPHILIZED, FOR SOLUTION INTRAMUSCULAR; INTRAVENOUS ONCE
Status: DISCONTINUED | OUTPATIENT
Start: 2022-04-07 | End: 2022-04-10 | Stop reason: HOSPADM

## 2022-04-07 NOTE — ED NOTES
Per pt, he received IV contrast for a CT scan at [facility] today. Verified in patient's chart that he did receive contrast around 1410 for CT abd/pelvis with contrast. Pt unable to receive second dose of IV contrast within 24 hours at this time per radiology. Dr. Fritz Watts aware.       Leonarda Mireles, VINAYAK  04/06/22 7422

## 2022-04-07 NOTE — ED NOTES
Left pharmacy a voicemail at this time regarding pt prescription for outpatient premedication for CTA with contrast.      Kristina March RN  04/06/22 2784

## 2022-04-07 NOTE — PROGRESS NOTES
Patient stuck multiple times for  A CTA unable to obtain IV access at this time. Patient refused to be stuck another time. Patient left .

## 2022-04-07 NOTE — ED NOTES
Pt left ED ambulatory with belongings at this time. Pt and mother verbalized understanding of discharge instructions and CTA scheduled for tomorrow.  Pt verbalized to return around 1330 tomorrow to be premedicated for CTA with contrast.      Leonarda Mireles RN  04/06/22 0243

## 2022-04-08 ENCOUNTER — HOSPITAL ENCOUNTER (OUTPATIENT)
Dept: CT IMAGING | Facility: HOSPITAL | Age: 47
Discharge: HOME | End: 2022-04-08
Payer: MEDICARE

## 2022-04-08 ENCOUNTER — TRANSCRIBE ORDERS (OUTPATIENT)
Dept: ADMINISTRATIVE | Facility: HOSPITAL | Age: 47
End: 2022-04-08

## 2022-04-08 ENCOUNTER — HOSPITAL ENCOUNTER (OUTPATIENT)
Facility: HOSPITAL | Age: 47
Setting detail: INFUSION SERIES
Discharge: HOME | End: 2022-04-10
Payer: MEDICARE

## 2022-04-08 VITALS
OXYGEN SATURATION: 97 % | DIASTOLIC BLOOD PRESSURE: 91 MMHG | RESPIRATION RATE: 18 BRPM | HEART RATE: 90 BPM | TEMPERATURE: 98.1 F | SYSTOLIC BLOOD PRESSURE: 123 MMHG

## 2022-04-08 VITALS
SYSTOLIC BLOOD PRESSURE: 125 MMHG | HEART RATE: 85 BPM | DIASTOLIC BLOOD PRESSURE: 94 MMHG | RESPIRATION RATE: 16 BRPM | OXYGEN SATURATION: 98 %

## 2022-04-08 DIAGNOSIS — R07.9 CHEST PAIN, UNSPECIFIED TYPE: ICD-10-CM

## 2022-04-08 DIAGNOSIS — R79.89 ELEVATED D-DIMER: ICD-10-CM

## 2022-04-08 PROCEDURE — 2580000003 HC RX 258: Performed by: EMERGENCY MEDICINE

## 2022-04-08 PROCEDURE — 71275 CT ANGIOGRAPHY CHEST: CPT

## 2022-04-08 PROCEDURE — 96372 THER/PROPH/DIAG INJ SC/IM: CPT

## 2022-04-08 PROCEDURE — 96374 THER/PROPH/DIAG INJ IV PUSH: CPT

## 2022-04-08 PROCEDURE — 2500000003 HC RX 250 WO HCPCS: Performed by: EMERGENCY MEDICINE

## 2022-04-08 PROCEDURE — 6360000002 HC RX W HCPCS: Performed by: EMERGENCY MEDICINE

## 2022-04-08 PROCEDURE — 6360000004 HC RX CONTRAST MEDICATION: Performed by: EMERGENCY MEDICINE

## 2022-04-08 RX ORDER — METHYLPREDNISOLONE SODIUM SUCCINATE 125 MG/2ML
INJECTION, POWDER, LYOPHILIZED, FOR SOLUTION INTRAMUSCULAR; INTRAVENOUS
Status: DISCONTINUED
Start: 2022-04-08 | End: 2022-04-09 | Stop reason: HOSPADM

## 2022-04-08 RX ORDER — DIPHENHYDRAMINE HYDROCHLORIDE 50 MG/ML
INJECTION INTRAMUSCULAR; INTRAVENOUS
Status: DISCONTINUED
Start: 2022-04-08 | End: 2022-04-09 | Stop reason: HOSPADM

## 2022-04-08 RX ORDER — METHYLPREDNISOLONE SODIUM SUCCINATE 125 MG/2ML
125 INJECTION, POWDER, LYOPHILIZED, FOR SOLUTION INTRAMUSCULAR; INTRAVENOUS ONCE
Status: COMPLETED | OUTPATIENT
Start: 2022-04-08 | End: 2022-04-08

## 2022-04-08 RX ORDER — DIPHENHYDRAMINE HYDROCHLORIDE 50 MG/ML
25 INJECTION INTRAMUSCULAR; INTRAVENOUS ONCE
Status: COMPLETED | OUTPATIENT
Start: 2022-04-08 | End: 2022-04-08

## 2022-04-08 RX ADMIN — SODIUM CHLORIDE, PRESERVATIVE FREE 20 MG: 5 INJECTION INTRAVENOUS at 17:39

## 2022-04-08 RX ADMIN — DIPHENHYDRAMINE HYDROCHLORIDE 25 MG: 50 INJECTION, SOLUTION INTRAMUSCULAR; INTRAVENOUS at 17:39

## 2022-04-08 RX ADMIN — METHYLPREDNISOLONE SODIUM SUCCINATE 125 MG: 125 INJECTION, POWDER, FOR SOLUTION INTRAMUSCULAR; INTRAVENOUS at 17:38

## 2022-04-08 RX ADMIN — IOPAMIDOL 100 ML: 755 INJECTION, SOLUTION INTRAVENOUS at 18:40

## 2022-04-08 NOTE — PROGRESS NOTES
Pt had CTA prior to shift change and was pre-medicated due to noted allergies on his chart. Pt was in OP Scopes room on a monitor. On evaluation pt was a/o with no complaints. Below is the patients current vitals:  Pulse:86  B/P:113/82  SPO2:98% on room air   Orders are if the patients CT is negative he will be d/c'd to follow-up with his family doctor after his IV is d/c'd.

## 2022-04-08 NOTE — PROGRESS NOTES
Pt CTA Chest was negative per radiology report. He was d/c'd per orders to follow-up with his family doctor and he had no follow-up questions. Pt's family member picked up and pt ambulated with no issues to the parking lot to meet family.

## 2022-04-19 ENCOUNTER — OFFICE VISIT (OUTPATIENT)
Dept: FAMILY MEDICINE CLINIC | Age: 47
End: 2022-04-19
Payer: COMMERCIAL

## 2022-04-19 VITALS
SYSTOLIC BLOOD PRESSURE: 112 MMHG | OXYGEN SATURATION: 99 % | WEIGHT: 206.6 LBS | HEART RATE: 94 BPM | TEMPERATURE: 97.9 F | HEIGHT: 72 IN | BODY MASS INDEX: 27.98 KG/M2 | DIASTOLIC BLOOD PRESSURE: 68 MMHG

## 2022-04-19 DIAGNOSIS — H81.01 MENIERE'S DISEASE OF RIGHT EAR: ICD-10-CM

## 2022-04-19 PROCEDURE — 99213 OFFICE O/P EST LOW 20 MIN: CPT | Performed by: NURSE PRACTITIONER

## 2022-04-19 RX ORDER — PREDNISONE 20 MG/1
20 TABLET ORAL DAILY
Qty: 10 TABLET | Refills: 0 | Status: SHIPPED | OUTPATIENT
Start: 2022-04-19 | End: 2022-04-29

## 2022-04-19 SDOH — ECONOMIC STABILITY: FOOD INSECURITY: WITHIN THE PAST 12 MONTHS, YOU WORRIED THAT YOUR FOOD WOULD RUN OUT BEFORE YOU GOT MONEY TO BUY MORE.: NEVER TRUE

## 2022-04-19 SDOH — ECONOMIC STABILITY: FOOD INSECURITY: WITHIN THE PAST 12 MONTHS, THE FOOD YOU BOUGHT JUST DIDN'T LAST AND YOU DIDN'T HAVE MONEY TO GET MORE.: NEVER TRUE

## 2022-04-19 ASSESSMENT — ENCOUNTER SYMPTOMS
DIARRHEA: 0
SHORTNESS OF BREATH: 0
WHEEZING: 0
VOMITING: 0
CONSTIPATION: 0
COUGH: 0
BACK PAIN: 0
NAUSEA: 1

## 2022-04-19 ASSESSMENT — SOCIAL DETERMINANTS OF HEALTH (SDOH): HOW HARD IS IT FOR YOU TO PAY FOR THE VERY BASICS LIKE FOOD, HOUSING, MEDICAL CARE, AND HEATING?: NOT HARD AT ALL

## 2022-05-09 ENCOUNTER — APPOINTMENT (OUTPATIENT)
Dept: GENERAL RADIOLOGY | Age: 47
End: 2022-05-09
Payer: COMMERCIAL

## 2022-05-09 ENCOUNTER — HOSPITAL ENCOUNTER (EMERGENCY)
Age: 47
Discharge: HOME | End: 2022-05-09
Payer: COMMERCIAL

## 2022-05-09 VITALS
TEMPERATURE: 98.8 F | BODY MASS INDEX: 27.09 KG/M2 | WEIGHT: 200 LBS | SYSTOLIC BLOOD PRESSURE: 133 MMHG | DIASTOLIC BLOOD PRESSURE: 82 MMHG | OXYGEN SATURATION: 98 % | HEIGHT: 72 IN | RESPIRATION RATE: 16 BRPM | HEART RATE: 62 BPM

## 2022-05-09 DIAGNOSIS — S93.491A SPRAIN OF ANTERIOR TALOFIBULAR LIGAMENT OF RIGHT ANKLE, INITIAL ENCOUNTER: Primary | ICD-10-CM

## 2022-05-09 DIAGNOSIS — S93.601A SPRAIN OF RIGHT FOOT, INITIAL ENCOUNTER: ICD-10-CM

## 2022-05-09 PROCEDURE — 73610 X-RAY EXAM OF ANKLE: CPT

## 2022-05-09 PROCEDURE — 73630 X-RAY EXAM OF FOOT: CPT

## 2022-05-09 PROCEDURE — 99283 EMERGENCY DEPT VISIT LOW MDM: CPT

## 2022-05-09 ASSESSMENT — PAIN DESCRIPTION - ORIENTATION: ORIENTATION: RIGHT

## 2022-05-09 ASSESSMENT — PAIN DESCRIPTION - LOCATION: LOCATION: FOOT

## 2022-05-09 ASSESSMENT — PAIN DESCRIPTION - PAIN TYPE: TYPE: ACUTE PAIN

## 2022-05-09 ASSESSMENT — PAIN - FUNCTIONAL ASSESSMENT: PAIN_FUNCTIONAL_ASSESSMENT: 0-10

## 2022-05-09 ASSESSMENT — PAIN DESCRIPTION - DESCRIPTORS: DESCRIPTORS: ACHING;SHARP

## 2022-05-09 ASSESSMENT — PAIN SCALES - GENERAL: PAINLEVEL_OUTOF10: 5

## 2022-05-09 NOTE — ED PROVIDER NOTES
[address]  Department of Emergency Medicine   ED  Encounter Note  Admit Date/RoomTime: 2022  5:28 PM  ED Room: Antonieta Jefferson    NAME: Jay Reed  : 1975  MRN: [id number]     Chief Complaint:  Foot Injury (Right foot injured stepping off deck today. Ibuprofen taken about 2pm)    History of Present Illness       Jay Reed is a 42 y.o. old male who presents to the emergency department for evaluation of right foot injury that occurred a few hours ago. Patient states he stepped off his deck and rolled his foot inward causing pain and swelling immediately at the time of injury. He did not have any open wound but has pain with bearing weight. He denies a history of fracture or surgical intervention of this area in the past.  He denies any other injury associated with this fall including head injury or loss consciousness, neck pain or back pain. His symptoms are moderate in severity and persistent nature. ROS   Pertinent positives and negatives are stated within HPI, all other systems reviewed and are negative. Past Medical History:  has a past medical history of Anxiety and H/O Meniere's disease. Surgical History:  has a past surgical history that includes hernia repair and knee surgery (Left). Social History:  reports that he quit smoking about 10 years ago. He has never used smokeless tobacco. He reports that he does not drink alcohol and does not use drugs. Family History: family history is not on file. Allergies: Erythromycin    Physical Exam   Oxygen Saturation Interpretation: Normal.        ED Triage Vitals   BP Temp Temp src Pulse Resp SpO2 Height Weight   22 1722 22 1722 -- 22 1722 22 1722 22 1722 22 1732 22 1732   133/82 98.8 °F (37.1 °C)  62 16 98 % 6' (1.829 m) 200 lb (90.7 kg)         Constitutional:  Alert, development consistent with age. HEENT:  NC/NT.  No outward sign of trauma. Airway patent. Neck:  Normal ROM. Supple. Respiratory: No respiratory distress or increased work of breathing. Cardiovascular: Regular rate and rhythm. Strong distal pulses. Back: No midline vertebral tenderness, step-off or crepitus. No lower lumbar pain on palpation. Right Lower Extremity(s):              Tenderness: There is moderate tenderness to palpation of the proximal lateral aspect of the foot and fifth metatarsal as well as the anterior talofibular ligament with swelling and faint ecchymosis. There is no tenderness to palpation of the other aspects of the foot, lateral or medial malleolus, Achilles tendon, or through to the proximal tib-fib. Swelling: Mildat the lateral foot with compartments soft. Calf:  No evidence of DVT seen on physical exam. No cords or calf tenderness. .             Deformity: no deformity observed/palpated. ROM: full range with pain. Skin:  no wounds, erythema, or warmth. Neurovascular: Motor deficit: none. Sensory deficit: none. Pulse deficit: none. Capillary refill: normal.  Gait:  limp due to affected limb. Neurological:  Alert and oriented. Motor and sensory functions intact unless otherwise described above. Lab / Imaging Results   (All laboratory and radiology results have been personally reviewed by myself)  Labs:  No results found for this visit on 05/09/22. Imaging: All Radiology results interpreted by Radiologist unless otherwise noted. XR FOOT RIGHT (MIN 3 VIEWS)   Final Result   No acute osseous abnormality. XR ANKLE RIGHT (MIN 3 VIEWS)   Final Result   No acute osseous abnormality. ED Course / Medical Decision Making   Medications - No data to display       Re-examination:  5/9/22     Time: 1915  Patients symptoms are improving. Repeat physical examination is unchanged.   Discussed results and treatment plan.    Consults:   None    Procedure(s):  None    MDM:      Neurovascularly intact. Imaging was obtained based as per history and physical exam findings. Interpretation as per radiologist negative for acute findings as documented above. Plan is subsequently for symptom control with Ace wrap, Aircast, postop shoe, range of motion exercises, ice, over-the-counter Motrin as needed (he declines a RX) and with appropriate outpatient follow-up with orthopedics for persistent symptoms as provided on their discharge handout. Patient is educated on S/S to watch for indicative of re-evaluation in the ER setting including worsening of current symptoms not responding to the treatment plan. Patient verbalized understanding of instructions and is amenable to this treatment plan. Patient departed in stable condition. Plan of Care/Counseling:  ADAN Lunsford CNP reviewed today's visit with the patient in addition to providing specific details for the plan of care and counseling regarding the diagnosis and prognosis. Questions are answered at this time and are agreeable with the plan. Assessment      1. Sprain of anterior talofibular ligament of right ankle, initial encounter    2. Sprain of right foot, initial encounter      Plan   Discharged home. Patient condition is stable    New Medications     New Prescriptions    No medications on file     Electronically signed by ADAN Lunsford CNP   DD: 5/9/22  **This report was transcribed using voice recognition software. Every effort was made to ensure accuracy; however, inadvertent computerized transcription errors may be present.   END OF ED PROVIDER NOTE        ADAN Lunsford CNP  05/09/22 5245

## 2022-05-10 ENCOUNTER — TELEPHONE (OUTPATIENT)
Dept: SPORTS MEDICINE | Age: 47
End: 2022-05-10

## 2022-05-10 ENCOUNTER — TELEPHONE (OUTPATIENT)
Dept: ADMINISTRATIVE | Age: 47
End: 2022-05-10

## 2022-05-10 DIAGNOSIS — S93.401A SPRAIN OF RIGHT ANKLE, UNSPECIFIED LIGAMENT, INITIAL ENCOUNTER: Primary | ICD-10-CM

## 2022-05-10 NOTE — TELEPHONE ENCOUNTER
ED 5/9/22 [facility] on call  Chief Complaint:  Foot Injury (Right foot injured stepping off deck today. Ibuprofen taken about 2pm)     Imaging: All Radiology results interpreted by Radiologist unless otherwise noted. XR FOOT RIGHT (MIN 3 VIEWS)   Final Result   No acute osseous abnormality.           XR ANKLE RIGHT (MIN 3 VIEWS)   Final Result   No acute osseous abnormality.         Routing to providers for scheduling rec.

## 2022-05-10 NOTE — TELEPHONE ENCOUNTER
Pt needs ER follow up 05/09 [facility]. Sprain of anterior talofibular ligament of right ankle, initial encounter. Pt states the shoe they gave him called a Post op shoe feels like his ankle is unstable.  on call. Please contact pt.

## 2022-05-11 ENCOUNTER — APPOINTMENT (OUTPATIENT)
Dept: GENERAL RADIOLOGY | Facility: HOSPITAL | Age: 47
End: 2022-05-11
Payer: MEDICARE

## 2022-05-11 ENCOUNTER — HOSPITAL ENCOUNTER (EMERGENCY)
Facility: HOSPITAL | Age: 47
Discharge: HOME | End: 2022-05-12
Attending: HOSPITALIST
Payer: MEDICARE

## 2022-05-11 ENCOUNTER — SOCIAL WORK (OUTPATIENT)
Dept: SOCIAL WORK | Facility: HOSPITAL | Age: 47
End: 2022-05-11

## 2022-05-11 VITALS
HEART RATE: 113 BPM | WEIGHT: 50.06 LBS | BODY MASS INDEX: 6.79 KG/M2 | DIASTOLIC BLOOD PRESSURE: 77 MMHG | TEMPERATURE: 98.2 F | OXYGEN SATURATION: 99 % | SYSTOLIC BLOOD PRESSURE: 112 MMHG | RESPIRATION RATE: 16 BRPM

## 2022-05-11 DIAGNOSIS — F10.920 ACUTE ALCOHOLIC INTOXICATION WITHOUT COMPLICATION (HCC): ICD-10-CM

## 2022-05-11 DIAGNOSIS — F15.10 METHAMPHETAMINE ABUSE (HCC): ICD-10-CM

## 2022-05-11 DIAGNOSIS — R07.9 CHEST PAIN, UNSPECIFIED TYPE: Primary | ICD-10-CM

## 2022-05-11 DIAGNOSIS — R45.851 SUICIDAL IDEATIONS: ICD-10-CM

## 2022-05-11 LAB
A/G RATIO: 1.6 (ref 0.8–2)
ALBUMIN SERPL-MCNC: 4.2 G/DL (ref 3.4–4.8)
ALP BLD-CCNC: 106 U/L (ref 25–100)
ALT SERPL-CCNC: 90 U/L (ref 4–36)
ANION GAP SERPL CALCULATED.3IONS-SCNC: 25 MMOL/L (ref 3–16)
AST SERPL-CCNC: 104 U/L (ref 8–33)
BASOPHILS ABSOLUTE: 0.2 K/UL (ref 0–0.1)
BASOPHILS RELATIVE PERCENT: 1.9 %
BILIRUB SERPL-MCNC: 0.8 MG/DL (ref 0.3–1.2)
BUN BLDV-MCNC: 8 MG/DL (ref 6–20)
CALCIUM SERPL-MCNC: 9 MG/DL (ref 8.5–10.5)
CHLORIDE BLD-SCNC: 102 MMOL/L (ref 98–107)
CO2: 16 MMOL/L (ref 20–30)
CREAT SERPL-MCNC: 0.7 MG/DL (ref 0.4–1.2)
D DIMER: 0.5 UG/ML FEU (ref 0–0.6)
EOSINOPHILS ABSOLUTE: 0.1 K/UL (ref 0–0.4)
EOSINOPHILS RELATIVE PERCENT: 1 %
ETHANOL: 206 MG/DL (ref 0–0.08)
ETHANOL: 342 MG/DL (ref 0–0.08)
GFR AFRICAN AMERICAN: >59
GFR NON-AFRICAN AMERICAN: >59
GLOBULIN: 2.6 G/DL
GLUCOSE BLD-MCNC: 91 MG/DL (ref 74–106)
HCT VFR BLD CALC: 51.7 % (ref 40–54)
HEMOGLOBIN: 18.2 G/DL (ref 13–18)
IMMATURE GRANULOCYTES #: 0 K/UL
IMMATURE GRANULOCYTES %: 0 % (ref 0–5)
LYMPHOCYTES ABSOLUTE: 3.4 K/UL (ref 1.5–4)
LYMPHOCYTES RELATIVE PERCENT: 43.7 %
MAGNESIUM: 2 MG/DL (ref 1.7–2.4)
MCH RBC QN AUTO: 32.2 PG (ref 27–32)
MCHC RBC AUTO-ENTMCNC: 35.2 G/DL (ref 31–35)
MCV RBC AUTO: 91.3 FL (ref 80–100)
MONOCYTES ABSOLUTE: 0.5 K/UL (ref 0.2–0.8)
MONOCYTES RELATIVE PERCENT: 7 %
NEUTROPHILS ABSOLUTE: 3.6 K/UL (ref 2–7.5)
NEUTROPHILS RELATIVE PERCENT: 46.4 %
PDW BLD-RTO: 13.5 % (ref 11–16)
PLATELET # BLD: 361 K/UL (ref 150–400)
PMV BLD AUTO: 9 FL (ref 6–10)
POTASSIUM REFLEX MAGNESIUM: 3.5 MMOL/L (ref 3.4–5.1)
PRO-BNP: 41 PG/ML (ref 0–900)
RBC # BLD: 5.66 M/UL (ref 4.5–6)
SODIUM BLD-SCNC: 143 MMOL/L (ref 136–145)
TOTAL PROTEIN: 6.8 G/DL (ref 6.4–8.3)
TROPONIN: <0.3 NG/ML
TROPONIN: <0.3 NG/ML
WBC # BLD: 7.7 K/UL (ref 4–11)

## 2022-05-11 PROCEDURE — 80053 COMPREHEN METABOLIC PANEL: CPT

## 2022-05-11 PROCEDURE — 83735 ASSAY OF MAGNESIUM: CPT

## 2022-05-11 PROCEDURE — 84484 ASSAY OF TROPONIN QUANT: CPT

## 2022-05-11 PROCEDURE — 6360000002 HC RX W HCPCS: Performed by: EMERGENCY MEDICINE

## 2022-05-11 PROCEDURE — 96361 HYDRATE IV INFUSION ADD-ON: CPT

## 2022-05-11 PROCEDURE — 82077 ASSAY SPEC XCP UR&BREATH IA: CPT

## 2022-05-11 PROCEDURE — 85379 FIBRIN DEGRADATION QUANT: CPT

## 2022-05-11 PROCEDURE — 96366 THER/PROPH/DIAG IV INF ADDON: CPT

## 2022-05-11 PROCEDURE — 6360000002 HC RX W HCPCS: Performed by: HOSPITALIST

## 2022-05-11 PROCEDURE — 96365 THER/PROPH/DIAG IV INF INIT: CPT

## 2022-05-11 PROCEDURE — 2580000003 HC RX 258: Performed by: HOSPITALIST

## 2022-05-11 PROCEDURE — 96376 TX/PRO/DX INJ SAME DRUG ADON: CPT

## 2022-05-11 PROCEDURE — 83880 ASSAY OF NATRIURETIC PEPTIDE: CPT

## 2022-05-11 PROCEDURE — 85025 COMPLETE CBC W/AUTO DIFF WBC: CPT

## 2022-05-11 PROCEDURE — 36415 COLL VENOUS BLD VENIPUNCTURE: CPT

## 2022-05-11 PROCEDURE — 71045 X-RAY EXAM CHEST 1 VIEW: CPT

## 2022-05-11 PROCEDURE — 99285 EMERGENCY DEPT VISIT HI MDM: CPT

## 2022-05-11 PROCEDURE — 6370000000 HC RX 637 (ALT 250 FOR IP): Performed by: HOSPITALIST

## 2022-05-11 PROCEDURE — 96375 TX/PRO/DX INJ NEW DRUG ADDON: CPT

## 2022-05-11 RX ORDER — DIAZEPAM 5 MG/1
5 TABLET ORAL ONCE
Status: COMPLETED | OUTPATIENT
Start: 2022-05-11 | End: 2022-05-11

## 2022-05-11 RX ORDER — LORAZEPAM 2 MG/ML
1 INJECTION INTRAMUSCULAR ONCE
Status: COMPLETED | OUTPATIENT
Start: 2022-05-11 | End: 2022-05-11

## 2022-05-11 RX ORDER — SODIUM CHLORIDE 9 MG/ML
1000 INJECTION, SOLUTION INTRAVENOUS CONTINUOUS
Status: DISCONTINUED | OUTPATIENT
Start: 2022-05-11 | End: 2022-05-12 | Stop reason: HOSPADM

## 2022-05-11 RX ORDER — NITROGLYCERIN 0.4 MG/1
0.4 TABLET SUBLINGUAL EVERY 5 MIN PRN
Status: DISCONTINUED | OUTPATIENT
Start: 2022-05-11 | End: 2022-05-12 | Stop reason: HOSPADM

## 2022-05-11 RX ORDER — ASPIRIN 81 MG/1
324 TABLET, CHEWABLE ORAL ONCE
Status: COMPLETED | OUTPATIENT
Start: 2022-05-11 | End: 2022-05-11

## 2022-05-11 RX ORDER — 0.9 % SODIUM CHLORIDE 0.9 %
1000 INTRAVENOUS SOLUTION INTRAVENOUS ONCE
Status: COMPLETED | OUTPATIENT
Start: 2022-05-11 | End: 2022-05-11

## 2022-05-11 RX ORDER — ONDANSETRON 2 MG/ML
4 INJECTION INTRAMUSCULAR; INTRAVENOUS ONCE
Status: COMPLETED | OUTPATIENT
Start: 2022-05-11 | End: 2022-05-11

## 2022-05-11 RX ORDER — IBUPROFEN 800 MG/1
800 TABLET ORAL ONCE
Status: COMPLETED | OUTPATIENT
Start: 2022-05-11 | End: 2022-05-11

## 2022-05-11 RX ADMIN — ASPIRIN 81 MG 324 MG: 81 TABLET ORAL at 16:07

## 2022-05-11 RX ADMIN — LORAZEPAM 1 MG: 2 INJECTION INTRAMUSCULAR; INTRAVENOUS at 15:59

## 2022-05-11 RX ADMIN — SODIUM CHLORIDE 1000 ML: 9 INJECTION, SOLUTION INTRAVENOUS at 16:30

## 2022-05-11 RX ADMIN — IBUPROFEN 800 MG: 800 TABLET, FILM COATED ORAL at 18:09

## 2022-05-11 RX ADMIN — DIAZEPAM 5 MG: 5 TABLET ORAL at 18:09

## 2022-05-11 RX ADMIN — LORAZEPAM 1 MG: 2 INJECTION INTRAMUSCULAR; INTRAVENOUS at 21:50

## 2022-05-11 RX ADMIN — Medication 0.4 MG: at 16:15

## 2022-05-11 RX ADMIN — ONDANSETRON 4 MG: 2 INJECTION INTRAMUSCULAR; INTRAVENOUS at 20:29

## 2022-05-11 RX ADMIN — Medication 0.4 MG: at 16:21

## 2022-05-11 RX ADMIN — Medication 0.4 MG: at 16:07

## 2022-05-11 RX ADMIN — Medication 25 MG: at 18:52

## 2022-05-11 RX ADMIN — SODIUM CHLORIDE 1000 ML: 9 INJECTION, SOLUTION INTRAVENOUS at 16:15

## 2022-05-11 RX ADMIN — Medication 12.5 MG: at 21:51

## 2022-05-11 ASSESSMENT — PATIENT HEALTH QUESTIONNAIRE - PHQ9: SUM OF ALL RESPONSES TO PHQ QUESTIONS 1-9: 23

## 2022-05-11 ASSESSMENT — HEART SCORE
ECG: 0
ECG: 0

## 2022-05-11 ASSESSMENT — PAIN SCALES - GENERAL
PAINLEVEL_OUTOF10: 10
PAINLEVEL_OUTOF10: 3
PAINLEVEL_OUTOF10: 10

## 2022-05-11 ASSESSMENT — PAIN DESCRIPTION - LOCATION
LOCATION: CHEST

## 2022-05-11 ASSESSMENT — PAIN DESCRIPTION - FREQUENCY: FREQUENCY: CONTINUOUS

## 2022-05-11 ASSESSMENT — PAIN DESCRIPTION - DESCRIPTORS
DESCRIPTORS: THROBBING
DESCRIPTORS: SHARP;STABBING

## 2022-05-11 ASSESSMENT — PAIN DESCRIPTION - PAIN TYPE
TYPE: ACUTE PAIN
TYPE: ACUTE PAIN

## 2022-05-11 ASSESSMENT — PAIN DESCRIPTION - ORIENTATION
ORIENTATION: MID

## 2022-05-11 ASSESSMENT — LIFESTYLE VARIABLES
HOW MANY STANDARD DRINKS CONTAINING ALCOHOL DO YOU HAVE ON A TYPICAL DAY: 10 OR MORE
HOW OFTEN DO YOU HAVE A DRINK CONTAINING ALCOHOL: 4 OR MORE TIMES A WEEK

## 2022-05-11 NOTE — ED NOTES
Went into see pt. He is lying in bed,requesting some ice water. Took pt.a glass of ice water per pt's request.No other c/o at this time. Ian Oden behavioral health specialist is at pt's bedside at this time.      Fiorella Dutta RN  05/11/22 1955

## 2022-05-11 NOTE — ED NOTES
Patient refused repeat troponin at this time, Nurse and MD notified.       Arian Spencer  05/11/22 8663

## 2022-05-11 NOTE — TELEPHONE ENCOUNTER
Future Appointments   Date Time Provider Juve David   5/17/2022 10:00 AM Stiven Glover MD [address] [facility]

## 2022-05-11 NOTE — PROGRESS NOTES
Behavioral Health Consultation  [address] Consultant  2022  3:45PM    Time spent with Patient: 60 minutes  [facility]. Reason for Consult: Suicidal ideation   Referring Provider: No referring provider defined for this encounter. Pt provided informed consent for the behavioral health program. Discussed with patient model of service to include the limits of confidentiality (i.e. abuse reporting, suicide intervention, etc.) and short-term intervention focused approach. Pt indicated understanding. S:  Pt  Is a 42year old male who arrived via [facility]. Per ems report, pt called for ems, for chest pain, after snorting meth, and stated to ems that he wished he was dead. Nurses reported that on arrival pt refused to given any information other then first name. Refused to verify name and  to the nurses.      Later in triage while pt was talking to Gita Flower, pt states \"I wish I wasn't living I am so sick and I'm tired\"      Patient reported to [facility] that he wished he was dead and that he didn't want to live anymore, \"what was the point? \"    O:  MSE:  Appearance: adequate hygiene  Attitude: guarded, tearful and moderate distress  Consciousness: alert  Orientation: oriented to person, place, time, general circumstance  Memory: recent and remote memory intact  Attention/Concentration: intact during session  Psychomotor Activity: normal  Eye Contact: normal  Speech: loud volume  Mood: anxious and irritable  Affect: congruent with thought content and mood  Perception: within normal limits  Thought Content: excessive preoccupations   Thought Process: illogical  Insight: poor  Judgment: impaired  Morbid ideation: no  Suicide Assessment: suicidal ideation without plan or intent      History:    Medications:   Current Facility-Administered Medications   Medication Dose Route Frequency Provider Last Rate Last Admin    nitroGLYCERIN (NITROSTAT) SL tablet 0.4 mg  0.4 mg SubLINGual Q5 Min PRN Jasvir Rice, DO   0.4 mg at 05/11/22 1621    0.9 % sodium chloride infusion  1,000 mL IntraVENous Continuous Jasvir Rice, DO        promethazine (PHENERGAN) in sodium chloride 0.9% 50 mL IVPB 25 mg  25 mg IntraVENous Q6H PRN Jasvir Rice,  mL/hr at 05/11/22 1852 25 mg at 05/11/22 5476     Current Outpatient Medications   Medication Sig Dispense Refill    busPIRone (BUSPAR) 30 MG tablet 15 mg       diclofenac (VOLTAREN) 75 MG EC tablet  (Patient not taking: Reported on 7/6/2021)      gabapentin (NEURONTIN) 800 MG tablet TAKE 1 TABLET BY MOUTH THREE TIMES DAILY      doxepin (SINEQUAN) 50 MG capsule TAKE 1 TO 2 CAPSULES BY MOUTH AT BEDTIME (Patient not taking: Reported on 7/6/2021)      buPROPion (WELLBUTRIN XL) 150 MG extended release tablet Take 150 mg by mouth every morning      chlordiazePOXIDE (LIBRIUM) 25 MG capsule Take 25 mg by mouth 3 times daily as needed for Anxiety.  pantoprazole (PROTONIX) 40 MG tablet Take 40 mg by mouth daily      sucralfate (CARAFATE) 1 GM tablet Take 1 g by mouth 4 times daily      ondansetron (ZOFRAN-ODT) 4 MG disintegrating tablet Take 4 mg by mouth every 8 hours as needed for Nausea or Vomiting      gabapentin (NEURONTIN) 400 MG capsule Take 800 mg by mouth 3 times daily.  (Patient not taking: Reported on 7/6/2021)      lurasidone (LATUDA) 40 MG TABS tablet Take 80 mg by mouth daily          Social History:   Social History     Socioeconomic History    Marital status:      Spouse name: Not on file    Number of children: Not on file    Years of education: Not on file    Highest education level: Not on file   Occupational History    Not on file   Tobacco Use    Smoking status: Current Every Day Smoker     Packs/day: 0.50     Years: 15.00     Pack years: 7.50     Types: E-Cigarettes    Smokeless tobacco: Never Used   Vaping Use    Vaping Use: Some days    Substances: Nicotine    Devices: Pre-filled or refillable cartridge, Refillable tank   Substance and Sexual Activity    Alcohol use: Yes     Alcohol/week: 0.0 standard drinks     Comment: daily, pint    Drug use: Yes     Frequency: 2.0 times per week     Types: Marijuana (Weed), Methamphetamines (Crystal Meth)     Comment: weed, \"first time today for meth\"    Sexual activity: Not on file   Other Topics Concern    Not on file   Social History Narrative    Not on file     Social Determinants of Health     Financial Resource Strain: Unknown    Difficulty of Paying Living Expenses: Patient refused   Food Insecurity: Unknown    Worried About Running Out of Food in the Last Year: Patient refused    Ran Out of Food in the Last Year: Patient refused   Transportation Needs:     Lack of Transportation (Medical): Not on file    Lack of Transportation (Non-Medical): Not on file   Physical Activity:     Days of Exercise per Week: Not on file    Minutes of Exercise per Session: Not on file   Stress:     Feeling of Stress : Not on file   Social Connections:     Frequency of Communication with Friends and Family: Not on file    Frequency of Social Gatherings with Friends and Family: Not on file    Attends Taoist Services: Not on file    Active Member of [address] [place] [address] [employer] or Organizations: Not on file    Attends Club or Organization Meetings: Not on file    Marital Status: Not on file   Intimate Partner Violence:     Fear of Current or Ex-Partner: Not on file    Emotionally Abused: Not on file    Physically Abused: Not on file    Sexually Abused: Not on file   Housing Stability:     Unable to Pay for Housing in the Last Year: Not on file    Number of [place] in the Last Year: Not on file    Unstable Housing in the Last Year: Not on file       TOBACCO:   reports that he has been smoking e-cigarettes. He has a 7.50 pack-year smoking history. He has never used smokeless tobacco.  ETOH:   reports current alcohol use.     Family History:   No family history on file.    A:  Risk assessment done. No flowsheet data found. Interpretation of DANETTE-7 score: 5-9 = mild anxiety, 10-14 = moderate anxiety, 15+ = severe anxiety. Recommend referral to behavioral health for scores 10 or greater. PHQ Scores 7/6/2021   PHQ2 Score 6   PHQ9 Score 6     Interpretation of Total Score Depression Severity: 1-4 = Minimal depression, 5-9 = Mild depression, 10-14 = Moderate depression, 15-19 = Moderately severe depression, 20-27 = Severe depression    @C-SSRS@    Diagnosis:    1. Chest pain, unspecified type    2. Methamphetamine abuse ([facility] [address]    3. Suicidal ideations    4. Acute alcoholic intoxication without complication [facility])        Patient Active Problem List   Diagnosis    Frequent headaches    Back pain    Hypertension    Contrast media allergy    Other chest pain    Elevated d-dimer         Plan:  Pt interventions:  Discussed prevalence of  depression, suicidal thoughts/threats, anxiety and stress for general population, Practiced assertive communication, Discussed benefits of referral for specialty care, Brought attention to patient's elevated level of drinking, Recommended limiting alcohol use or abstaining, Informed patient of the effects of alcohol, Discussed and problem-solved barriers in adhering to behavioral change plan, Discussed potential barriers to change, Established rapport, Supportive techniques and Identified maladaptive thoughts. Conducted risk assessment. Initiated Intake to an inpatient medical detox       Pt Behavioral Change Plan:  Pt set the following goals:    To reduce suicidal ideation  To reduce depression symptoms  To reduce use of illicit drugs  To reduce alcohol intake       Will follow up as needed       Electronically signed by Robyn Tompkins on 5/11/2022 at 7:57 PM\

## 2022-05-11 NOTE — ED NOTES
Kevin Hodge advised that pt is suicide, and is requesting help with detox and rehab from [facility]. Dr. Ke Haley aware. Pt was un-cooperative with undressing and wearing gown. Spoke with Dr. Ke Haley, and Kevin Hodge, agreeable to take off shoes and jeans, will give patient pants, and let leave shirt. Pt compliant. And moved to [address] [place] [address].         Alberta Mulligan RN  05/11/22 3929

## 2022-05-11 NOTE — ED TRIAGE NOTES
Pt arrived via [facility]. Per ems report, pt called for ems, for chest pain, after snorting meth, and stated to ems that he wished he was dead. On arrival pt refused to given any information other then first name. Refused to verify name and . triage was started,after several minutes, pt states that he was willing to talk to [place] [facility], pt verified name and . Pt was asked by Cecelia Cowden RN if he was having any thought of harming or killing him self, pt states \"absolutly not\", pt advised of was EMS reported, pt does not deny saying that but states he does not want to kill himself. Later in triage while pt was talking to [place], pt states \"I wish I wasn't living I am so sick and I'm tired\"     Asked patient again I he wanted to harm, kill himself, or wish he was dead, pt adamantly denies.  Pt states \"I dont want to kill myself I want to live that why im here\" \"I feel like im dying\"
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
## 2022-05-12 NOTE — ED NOTES
I asked pt. If he wanted to kill himself and he said no that he wanted a life.      Ruba Flores RN  05/11/22 7728

## 2022-05-12 NOTE — ED NOTES
Dr. Martin Castillo has accepted pt. To come to [facility]. [facility] notified of pt. Transport to [facility]. is lying in bed with eyes closed.      Lala Kennedy RN  05/12/22 [address] Heller RN  05/12/22 3273

## 2022-05-12 NOTE — ED NOTES
Spoke with intake rep at [facility] and she requested that we send labs to them. Labs faxed to them per [place].      Lala Kennedy RN  05/11/22 3663

## 2022-05-12 NOTE — ED NOTES
Awoke pt. IV d'c'ed and 2x2's dsg. Placed to site. Pt. advised that we did get him a bed at [address] was here to take him. Pt. amb.to car at time of d'c. Pt. awake/alert at time of d'c. Pt. Is cooperative at [address] [place] [address] Dr from JONATHAN Paz RN  05/12/22 8078

## 2022-05-12 NOTE — SUICIDE SAFETY PLAN
SAFETY PLAN    A suicide Safety Plan is a document that supports someone when they are having thoughts of suicide. Warning Signs that indicate a suicidal crisis may be developing: What (situations, thoughts, feelings, body sensations, behaviors, etc.) do you experience that lets you know you are beginning to think about suicide? 1. Heart racing  2. Chest pain   3. Feeling depressed    Internal Coping Strategies:  What things can I do (relaxation techniques, hobbies, physical activities, etc.) to take my mind off my problems without contacting another person? 1. Be outside   2.  3.   People and social settings that provide distraction: Who can I call or where can I go to distract me? 1. Name: Hilary Haskins (Parent) Phone: [phone] (Mobile)  2. Name:   Phone:   3. Place: Outside     4. Place:     People whom I can ask for help: Who can I call when I need help - for example, friends, family, clergy, someone else? 1. Name:Omar Davey             Phone:  [phone]   2. Name: Peewee Amos   Phone:   3. Name:  Phone:     Professionals or [address] [place] [address] I can contact during a crisis: Who can I call for help - for example, my doctor, my psychiatrist, my psychologist, a mental health provider, a suicide hotline? 1. Clinician Zelalem Renner  Phone: [phone]      Clinician Pager or Emergency Contact #:  [phone]        3. Suicide Prevention Lifeline: 3-262-361-TALK (1412)    4. [address] [place] [address] Emergency Services -  for example, [facility] suicide hotline, [facility] Hotline: [employer]       Emergency Services Address: [address] [facility]. Emergency Services Phone: [phone]    Making the environment safe: How can I make my environment (house/apartment/living space) safer? For example, can I remove guns, medications, and other items? 1. Remove medications  2.  Remove all weapons

## 2022-05-12 NOTE — ED TRIAGE NOTES
Spoke with Celeste Lebron 281 sup. At [place] 1527 given to [facility]. She advised that she will speak to the DrClairand will get back with us.

## 2022-05-12 NOTE — ED PROVIDER NOTES
Emergency Department Encounter  Location: [address] [place] [address] [address]    Patient: Jerrica Watson  MRN: [id number]  : 1975  Date of evaluation: 2022  ED Provider: MD Iron Stinson 89 was checked out to me by Dr. Jazzy Brandon pending repeat troponin, awaiting repeat alcohol level and disposition per Yogesh Trevino. Please see his/her initial documentation for details of the patient's initial ED presentation, physical exam and completed studies. In brief, Jerrica Watson is a 42 y.o. male that presented to the emergency department with alcohol intoxication, drug abuse and suicidal ideation. Patient's care was turned over at shift change from Dr. Jazzy Brandon pending repeat troponin for rule out acute MI, awaiting for alcohol level to decrease so that patient could be evaluated by Yogesh Trevino for evaluation and transfer and treatment of patient's suicidal ideation, alcoholism and drug abuse. Patient states that he has had some chest wall pain that is worse with changes in position and palpation. First initial troponin was negative.     I have reviewed and interpreted all of the currently available lab results and diagnostics from this visit:  Results for orders placed or performed during the hospital encounter of 22   CBC with Auto Differential   Result Value Ref Range    WBC 7.7 4.0 - 11.0 K/uL    RBC 5.66 4.50 - 6.00 M/uL    Hemoglobin 18.2 (H) 13.0 - 18.0 g/dL    Hematocrit 51.7 40.0 - 54.0 %    MCV 91.3 80.0 - 100.0 fL    MCH 32.2 (H) 27.0 - 32.0 pg    MCHC 35.2 (H) 31.0 - 35.0 g/dL    RDW 13.5 11.0 - 16.0 %    Platelets 559 355 - 710 K/uL    MPV 9.0 6.0 - 10.0 fL    Neutrophils % 46.4 %    Immature Granulocytes % 0.0 0.0 - 5.0 %    Lymphocytes % 43.7 %    Monocytes % 7.0 %    Eosinophils % 1.0 %    Basophils % 1.9 %    Neutrophils Absolute 3.6 2.0 - 7.5 K/uL    Immature Granulocytes # 0.0 K/uL    Lymphocytes Absolute 3.4 1.5 - 4.0 K/uL    Monocytes Absolute 0.5 0.2 - 0.8 K/uL    Eosinophils Absolute 0.1 0.0 - 0.4 K/uL    Basophils Absolute 0.2 (H) 0.0 - 0.1 K/uL   Comprehensive Metabolic Panel w/ Reflex to MG   Result Value Ref Range    Sodium 143 136 - 145 mmol/L    Potassium reflex Magnesium 3.5 3.4 - 5.1 mmol/L    Chloride 102 98 - 107 mmol/L    CO2 16 (L) 20 - 30 mmol/L    Anion Gap 25 (H) 3 - 16    Glucose 91 74 - 106 mg/dL    BUN 8 6 - 20 mg/dL    CREATININE 0.7 0.4 - 1.2 mg/dL    GFR Non-African American >59 >59    GFR African American >59 >59    Calcium 9.0 8.5 - 10.5 mg/dL    Total Protein 6.8 6.4 - 8.3 g/dL    Albumin 4.2 3.4 - 4.8 g/dL    Albumin/Globulin Ratio 1.6 0.8 - 2.0    Total Bilirubin 0.8 0.3 - 1.2 mg/dL    Alkaline Phosphatase 106 (H) 25 - 100 U/L    ALT 90 (H) 4 - 36 U/L     (H) 8 - 33 U/L    Globulin 2.6 Not Established g/dL   Troponin   Result Value Ref Range    Troponin <0.30 <0.30 ng/mL   Brain Natriuretic Peptide   Result Value Ref Range    Pro-BNP 41 0 - 900 pg/mL   D-Dimer, Quantitative   Result Value Ref Range    D-Dimer, Quant 0.50 0.00 - 0.60 ug/mL FEU   Troponin   Result Value Ref Range    Troponin <0.30 <0.30 ng/mL   Magnesium   Result Value Ref Range    Magnesium 2.0 1.7 - 2.4 mg/dL   Ethanol   Result Value Ref Range    Ethanol Lvl 342 mg/dL   Ethanol   Result Value Ref Range    Ethanol Lvl 206 mg/dL     XR CHEST PORTABLE    Result Date: 5/11/2022  Chest portable 1529 HISTORY: Chest pain     Clear lungs. Normal cardiomediastinal silhouette. Final ED Course and MDM: In brief, Joel Gonzalez is a 42 y.o. male whose care was signed out to me by the outgoing provider. In brief, please see above HPI, examination and treatment plan per Dr. Denny Lozano prior to shift change. After shift change we are waiting repeat troponin for rule out acute MI. Patient's repeat troponin was negative. Repeat alcohol was level 206.   Laurel Crane had evaluated the patient in consult at the [facility] who evaluated patient's chart and labs and patient was excepted for transfer to the [facility] per Dr. Yong Bautista. Patient was agreeable to go to be further evaluated and definitive care. Patient to be transferred [facility]. Patient was alert and oriented x3 with a GCS of 15.     ED Medication Orders (From admission, onward)    Start Ordered     Status Ordering Provider    05/11/22 2115 05/11/22 2112  LORazepam (ATIVAN) injection 1 mg  ONCE         Last MAR action: Given - by CHRIS Escobedo on 05/11/22 at [address]    05/11/22 2113 05/11/22 2113  promethazine (PHENERGAN) 12.5mg in sodium chloride 0.9% 50 mL IVPB 12.5 mg  EVERY 6 HOURS PRN         Last MAR action: New Bag - by Rebeca Anderson on 05/11/22 at [address]    05/11/22 2015 05/11/22 2005  ondansetron (ZOFRAN) injection 4 mg  ONCE         Last MAR action: Given - by Rebeca Anderson on 05/11/22 at [address]    05/11/22 1834 05/11/22 1834  promethazine (PHENERGAN) in sodium chloride 0.9% 50 mL IVPB 25 mg  EVERY 6 HOURS PRN         Last MAR action: Stopped - by CHRIS Escobedo on 05/11/22 at [address]    05/11/22 1830 05/11/22 1827  0.9 % sodium chloride infusion  CONTINUOUS         Acknowledged [facility]    05/11/22 1815 05/11/22 1805  ibuprofen (ADVIL;MOTRIN) tablet 800 mg  ONCE         Last MAR action: Given - by Otoniel Trinidad on 05/11/22 at [address] [place] [address], [facility]    05/11/22 1700 05/11/22 1649  diazePAM (VALIUM) tablet 5 mg  ONCE         Last MAR action: Given - by Otoniel Trinidad on 05/11/22 at [facility], [facility]    05/11/22 1630 05/11/22 1620  0.9 % sodium chloride bolus  ONCE         Last MAR action: Stopped - by ALLYSSA SIMON on 05/11/22 at [address] [facility]    05/11/22 1530 05/11/22 1517  0.9 % sodium chloride bolus  ONCE         Last MAR action: Stopped - by ALLYSSA SIMON on 05/11/22 at [address] [facility]    05/11/22 1530 05/11/22 1517  LORazepam (ATIVAN) injection 1 mg  ONCE         Last MAR action: Given - by Otoniel Trinidad on 05/11/22 at [address] [facility], [facility]    05/11/22 1530 05/11/22 1517  aspirin chewable tablet 324 mg  ONCE         Last MAR action: Given - by SAMPLES, ALLYSSA on 05/11/22 at [address]    05/11/22 1517 05/11/22 1518  nitroGLYCERIN (NITROSTAT) SL tablet 0.4 mg  EVERY 5 MIN PRN         Last MAR action: Given - by SAMPLES, ALLYSSA on 05/11/22 at [address]          Final Impression      1. Chest pain, unspecified type Stable   2. Methamphetamine abuse (HCC) Stable   3. Suicidal ideations Stable   4.  Acute alcoholic intoxication without complication ([facility] [address] Stable       DISPOSITION Decision To Transfer 05/12/2022 12:42:44 AM     (Please note that portions of this note may have been completed with a voice recognition program. Efforts were made to edit the dictations but occasionally words are mis-transcribed.)    Erwin Camarillo MD  [id number] Hairston MD  05/12/22 0346

## 2022-05-12 NOTE — ED NOTES
Pt. Requesting pain medicine,requesting suboxone,MD notified of pt.  Request.     Pippa Branch RN  05/11/22 2034

## 2022-10-21 ENCOUNTER — OFFICE VISIT (OUTPATIENT)
Dept: PRIMARY CARE CLINIC | Age: 47
End: 2022-10-21
Payer: COMMERCIAL

## 2022-10-21 VITALS — TEMPERATURE: 97.5 F | SYSTOLIC BLOOD PRESSURE: 120 MMHG | DIASTOLIC BLOOD PRESSURE: 86 MMHG | HEART RATE: 69 BPM

## 2022-10-21 DIAGNOSIS — J32.9 SINOBRONCHITIS: Primary | ICD-10-CM

## 2022-10-21 DIAGNOSIS — J40 SINOBRONCHITIS: Primary | ICD-10-CM

## 2022-10-21 LAB
Lab: NORMAL
PERFORMING INSTRUMENT: NORMAL
QC PASS/FAIL: NORMAL
SARS-COV-2, POC: NORMAL

## 2022-10-21 PROCEDURE — 87426 SARSCOV CORONAVIRUS AG IA: CPT | Performed by: NURSE PRACTITIONER

## 2022-10-21 PROCEDURE — G8484 FLU IMMUNIZE NO ADMIN: HCPCS | Performed by: NURSE PRACTITIONER

## 2022-10-21 PROCEDURE — 99213 OFFICE O/P EST LOW 20 MIN: CPT | Performed by: NURSE PRACTITIONER

## 2022-10-21 PROCEDURE — 1036F TOBACCO NON-USER: CPT | Performed by: NURSE PRACTITIONER

## 2022-10-21 PROCEDURE — G8419 CALC BMI OUT NRM PARAM NOF/U: HCPCS | Performed by: NURSE PRACTITIONER

## 2022-10-21 PROCEDURE — G8427 DOCREV CUR MEDS BY ELIG CLIN: HCPCS | Performed by: NURSE PRACTITIONER

## 2022-10-21 RX ORDER — PREDNISONE 10 MG/1
10 TABLET ORAL 2 TIMES DAILY
Qty: 10 TABLET | Refills: 0 | Status: SHIPPED | OUTPATIENT
Start: 2022-10-21 | End: 2022-10-26

## 2022-10-21 RX ORDER — GUAIFENESIN AND CODEINE PHOSPHATE 100; 10 MG/5ML; MG/5ML
10 SOLUTION ORAL EVERY 4 HOURS PRN
Qty: 150 ML | Refills: 0 | Status: SHIPPED | OUTPATIENT
Start: 2022-10-21 | End: 2022-10-26

## 2022-10-21 RX ORDER — DOXYCYCLINE HYCLATE 100 MG
100 TABLET ORAL 2 TIMES DAILY
Qty: 20 TABLET | Refills: 0 | Status: SHIPPED | OUTPATIENT
Start: 2022-10-21 | End: 2022-10-31

## 2022-10-21 NOTE — PROGRESS NOTES
Chief Complaint:   Cough and Chest Congestion      History of Present Illness   Source of history provided by:  patient. Inez Eugene is a 42 y.o. old male with a past medical history of:   Past Medical History:   Diagnosis Date    Anxiety 5/8/2020    H/O Meniere's disease        Pt presents to the [facility] care with a cough/congestion for the past several days. States the cough is  productive with yellow sputum. No fever noted. .  Denies any N/V/D, abdominal pain, CP, progressive SOB, dizziness, or lethargy. Pt has a h/o Bronchitis      ROS    Unless otherwise stated in this report or unable to obtain because of the patient's clinical or mental status as evidenced by the medical record, this patients's positive and negative responses for Review of Systems, constitutional, psych, eyes, ENT, cardiovascular, respiratory, gastrointestinal, neurological, genitourinary, musculoskeletal, integument systems and systems related to the presenting problem are either stated in the preceding or were not pertinent or were negative for the symptoms and/or complaints related to the medical problem. Past Surgical History:  has a past surgical history that includes hernia repair and knee surgery (Left). Social History:  reports that he quit smoking about 10 years ago. He has never used smokeless tobacco. He reports that he does not drink alcohol and does not use drugs. Family History: family history is not on file. Allergies: Erythromycin    Physical Exam         VS:  /86   Pulse 69   Temp 97.5 °F (36.4 °C) (Temporal)    Oxygen Saturation Interpretation: Normal.    Constitutional:  Alert, development consistent with age. Ears:  External Ears: Normal bilateral pinna. TM's & External Canals: TM's normal bilaterally without perforation. Canals without erythema or drainage. Nose:   There is no obvious septal defect. Moderate redness/edema  Mouth:  Moist bucca mucosa and normal tongue. Throat: Mild posterior pharyngeal erythema without exudates or lesions. Neck:  Supple. There is no obvious adenopathy or neck tenderness. Lungs:   Breath sounds: Normal chest expansion and breath sounds: moderately decreased bilaterally  Heart:  Regular rate and rhythm, normal heart sounds, without pathological murmurs, ectopy, gallops, or rubs. Skin:  Normal turgor. Warm, dry, without visible rash. Neurological:  Oriented. Motor functions intact. Lab / Imaging Results   (All laboratory and radiology results have been personally reviewed by myself)  Labs:  Results for orders placed or performed in visit on 10/21/22   POCT COVID-19, Antigen   Result Value Ref Range    SARS-COV-2, POC Not-Detected Not Detected    Lot Number [id number]     QC Pass/Fail pass     Performing Instrument BD Veritor        Imaging: All Radiology results interpreted by Radiologist unless otherwise noted. No orders to display         Assessment / Plan     Impression(s):  1. Sinobronchitis      Disposition:  Disposition: Advised Covid test is negative, start Doxycycline, Prednisone and Cheratussin as ordered, Advised on sedating effects of Cheratussin. OARRS report reviewed. Stay well hydrated, return to walk in care w/any worsening or concerning medical issues      Controlled Substance Monitoring:    Acute and Chronic Pain Monitoring:   RX Monitoring 10/21/2022   Periodic Controlled Substance Monitoring Possible medication side effects, risk of tolerance/dependence & alternative treatments discussed. ;No signs of potential drug abuse or diversion identified. ;Assessed functional status.

## 2022-11-10 ENCOUNTER — OFFICE VISIT (OUTPATIENT)
Dept: PRIMARY CARE CLINIC | Age: 47
End: 2022-11-10
Payer: COMMERCIAL

## 2022-11-10 VITALS
BODY MASS INDEX: 27.26 KG/M2 | HEART RATE: 89 BPM | TEMPERATURE: 97.6 F | SYSTOLIC BLOOD PRESSURE: 119 MMHG | DIASTOLIC BLOOD PRESSURE: 74 MMHG | OXYGEN SATURATION: 97 % | WEIGHT: 201 LBS | RESPIRATION RATE: 20 BRPM

## 2022-11-10 DIAGNOSIS — R06.2 WHEEZING: ICD-10-CM

## 2022-11-10 DIAGNOSIS — J40 BRONCHITIS: Primary | ICD-10-CM

## 2022-11-10 LAB
INFLUENZA A ANTIBODY: NEGATIVE
INFLUENZA B ANTIBODY: NEGATIVE
Lab: NORMAL
PERFORMING INSTRUMENT: NORMAL
QC PASS/FAIL: NORMAL
RSV ANTIGEN: NEGATIVE
SARS-COV-2, POC: NORMAL

## 2022-11-10 PROCEDURE — 1036F TOBACCO NON-USER: CPT | Performed by: NURSE PRACTITIONER

## 2022-11-10 PROCEDURE — 86756 RESPIRATORY VIRUS ANTIBODY: CPT | Performed by: NURSE PRACTITIONER

## 2022-11-10 PROCEDURE — 87426 SARSCOV CORONAVIRUS AG IA: CPT | Performed by: NURSE PRACTITIONER

## 2022-11-10 PROCEDURE — G8419 CALC BMI OUT NRM PARAM NOF/U: HCPCS | Performed by: NURSE PRACTITIONER

## 2022-11-10 PROCEDURE — G8427 DOCREV CUR MEDS BY ELIG CLIN: HCPCS | Performed by: NURSE PRACTITIONER

## 2022-11-10 PROCEDURE — G8484 FLU IMMUNIZE NO ADMIN: HCPCS | Performed by: NURSE PRACTITIONER

## 2022-11-10 PROCEDURE — 87804 INFLUENZA ASSAY W/OPTIC: CPT | Performed by: NURSE PRACTITIONER

## 2022-11-10 PROCEDURE — 99213 OFFICE O/P EST LOW 20 MIN: CPT | Performed by: NURSE PRACTITIONER

## 2022-11-10 RX ORDER — LEVOFLOXACIN 500 MG/1
500 TABLET, FILM COATED ORAL DAILY
Qty: 7 TABLET | Refills: 0 | Status: SHIPPED | OUTPATIENT
Start: 2022-11-10 | End: 2022-11-17

## 2022-11-10 RX ORDER — LAMOTRIGINE 150 MG/1
TABLET ORAL
COMMUNITY
Start: 2022-10-12

## 2022-11-10 RX ORDER — GUAIFENESIN AND CODEINE PHOSPHATE 100; 10 MG/5ML; MG/5ML
10 SOLUTION ORAL 4 TIMES DAILY PRN
Qty: 150 ML | Refills: 0 | Status: SHIPPED | OUTPATIENT
Start: 2022-11-10 | End: 2022-11-15

## 2022-11-10 RX ORDER — BUSPIRONE HYDROCHLORIDE 15 MG/1
TABLET ORAL
COMMUNITY
Start: 2022-09-12

## 2022-11-10 RX ORDER — PREDNISONE 10 MG/1
10 TABLET ORAL 2 TIMES DAILY
Qty: 10 TABLET | Refills: 0 | Status: SHIPPED | OUTPATIENT
Start: 2022-11-10 | End: 2022-11-15

## 2022-11-10 NOTE — PROGRESS NOTES
Chief Complaint:   Cough (Onset 2 days ago, productive), Nasal Congestion (Onset 2 days ago ), Fatigue (Onset 2 days ago , ), and Fever (100.9 temp voiced patient )      History of Present Illness   Source of history provided by:  patient. Eusebio Marin is a 42 y.o. old male with a past medical history of:   Past Medical History:   Diagnosis Date    Anxiety 5/8/2020    H/O Meniere's disease         Pt presents to the [facility] care with a cough /congestion/fatigue/fevers for the past 2 days. States the cough is  productive with yellow sputum. Subjective fever noted. Denies any N/V/D, abdominal pain, CP, progressive SOB, dizziness, or lethargy. ROS    Unless otherwise stated in this report or unable to obtain because of the patient's clinical or mental status as evidenced by the medical record, this patients's positive and negative responses for Review of Systems, constitutional, psych, eyes, ENT, cardiovascular, respiratory, gastrointestinal, neurological, genitourinary, musculoskeletal, integument systems and systems related to the presenting problem are either stated in the preceding or were not pertinent or were negative for the symptoms and/or complaints related to the medical problem. Past Surgical History:  has a past surgical history that includes hernia repair and knee surgery (Left). Social History:  reports that he quit smoking about 10 years ago. His smoking use included cigarettes. He has never used smokeless tobacco. He reports that he does not drink alcohol and does not use drugs. Family History: family history is not on file. Allergies: Erythromycin    Physical Exam         VS:  /74   Pulse 89   Temp 97.6 °F (36.4 °C)   Resp 20   Wt 201 lb (91.2 kg)   SpO2 97%   BMI 27.26 kg/m²    Oxygen Saturation Interpretation: Normal.    Constitutional:  Alert, development consistent with age. Ears:  External Ears: Normal bilateral pinna.              TM's & External Canals: TM's normal bilaterally without perforation. Canals without erythema or drainage. Nose:   There is no obvious septal defect. Mouth:  Moist bucca mucosa and normal tongue. Throat: Mild posterior pharyngeal erythema without exudates or lesions. Neck:  Supple. There is no obvious adenopathy or neck tenderness. Lungs:   Breath sounds: Normal chest expansion and breath sounds:   Heart:  Regular rate and rhythm, normal heart sounds, without pathological murmurs, ectopy, gallops, or rubs. Skin:  Normal turgor. Warm, dry, without visible rash. Neurological:  Oriented. Motor functions intact. Lab / Imaging Results   (All laboratory and radiology results have been personally reviewed by myself)  Labs:  Results for orders placed or performed in visit on 11/10/22   POCT COVID-19, Antigen   Result Value Ref Range    SARS-COV-2, POC Not-Detected Not Detected    Lot Number [id number]     QC Pass/Fail pass     Performing Instrument BD Veritor    POCT Influenza A/B   Result Value Ref Range    Influenza A Ab NEGATIVE     Influenza B Ab NEGATIVE    POCT RSV   Result Value Ref Range    RSV Antigen negative        Imaging: All Radiology results interpreted by Radiologist unless otherwise noted. No orders to display         Assessment / Plan     Impression(s):  1. Bronchitis    2. Wheezing      Disposition:  Disposition: Advised Covid, RSV and Influenza are all negative. Start Levaquin, Prednisone and Cheratussin as ordered, advised on sedating effects of Cheratussin. Stay well hydrated, OARRS report reviewed, return to walk in care w/any worsening or concerning medical issues      Controlled Substance Monitoring:    Acute and Chronic Pain Monitoring:   RX Monitoring 11/10/2022   Periodic Controlled Substance Monitoring Possible medication side effects, risk of tolerance/dependence & alternative treatments discussed. ;No signs of potential drug abuse or diversion identified. ;Assessed functional status.

## 2023-01-03 ENCOUNTER — OFFICE VISIT (OUTPATIENT)
Dept: PRIMARY CARE CLINIC | Age: 48
End: 2023-01-03
Payer: COMMERCIAL

## 2023-01-03 VITALS — DIASTOLIC BLOOD PRESSURE: 80 MMHG | TEMPERATURE: 98.1 F | SYSTOLIC BLOOD PRESSURE: 133 MMHG | HEART RATE: 68 BPM

## 2023-01-03 DIAGNOSIS — J32.9 SINOBRONCHITIS: Primary | ICD-10-CM

## 2023-01-03 DIAGNOSIS — J40 SINOBRONCHITIS: Primary | ICD-10-CM

## 2023-01-03 PROCEDURE — G8427 DOCREV CUR MEDS BY ELIG CLIN: HCPCS | Performed by: NURSE PRACTITIONER

## 2023-01-03 PROCEDURE — G8419 CALC BMI OUT NRM PARAM NOF/U: HCPCS | Performed by: NURSE PRACTITIONER

## 2023-01-03 PROCEDURE — 87804 INFLUENZA ASSAY W/OPTIC: CPT | Performed by: NURSE PRACTITIONER

## 2023-01-03 PROCEDURE — 96372 THER/PROPH/DIAG INJ SC/IM: CPT | Performed by: NURSE PRACTITIONER

## 2023-01-03 PROCEDURE — G8484 FLU IMMUNIZE NO ADMIN: HCPCS | Performed by: NURSE PRACTITIONER

## 2023-01-03 PROCEDURE — 1036F TOBACCO NON-USER: CPT | Performed by: NURSE PRACTITIONER

## 2023-01-03 PROCEDURE — 87426 SARSCOV CORONAVIRUS AG IA: CPT | Performed by: NURSE PRACTITIONER

## 2023-01-03 PROCEDURE — 99213 OFFICE O/P EST LOW 20 MIN: CPT | Performed by: NURSE PRACTITIONER

## 2023-01-03 RX ORDER — CEFDINIR 300 MG/1
300 CAPSULE ORAL 2 TIMES DAILY
Qty: 20 CAPSULE | Refills: 0 | Status: SHIPPED | OUTPATIENT
Start: 2023-01-03 | End: 2023-01-13

## 2023-01-03 RX ORDER — AZITHROMYCIN 250 MG/1
250 TABLET, FILM COATED ORAL SEE ADMIN INSTRUCTIONS
Qty: 6 TABLET | Refills: 0 | Status: SHIPPED | OUTPATIENT
Start: 2023-01-03 | End: 2023-01-08

## 2023-01-03 RX ORDER — PRAZOSIN HYDROCHLORIDE 1 MG/1
1 CAPSULE ORAL DAILY
COMMUNITY
Start: 2022-12-06

## 2023-01-03 RX ORDER — PREDNISONE 10 MG/1
TABLET ORAL
Qty: 30 TABLET | Refills: 0 | Status: SHIPPED | OUTPATIENT
Start: 2023-01-03

## 2023-01-03 RX ORDER — DEXAMETHASONE SODIUM PHOSPHATE 10 MG/ML
10 INJECTION, EMULSION INTRAMUSCULAR; INTRAVENOUS ONCE
Status: COMPLETED | OUTPATIENT
Start: 2023-01-03 | End: 2023-01-03

## 2023-01-03 RX ADMIN — DEXAMETHASONE SODIUM PHOSPHATE 10 MG: 10 INJECTION, EMULSION INTRAMUSCULAR; INTRAVENOUS at 13:44

## 2023-01-03 NOTE — PROGRESS NOTES
Chief Complaint:   Sinusitis, Chest Congestion, and Pharyngitis      History of Present Illness   Source of history provided by:  patient. Gale Tyson is a 41 y.o. old male with a past medical history of:   Past Medical History:   Diagnosis Date    Anxiety 5/8/2020    H/O Meniere's disease         Pt presents to the [facility] care with a cough/congestion/sore throat/yellow nasal drainage/wheezing for the past few days. States the cough is non productive. No  fever noted. Denies any N/V/D, abdominal pain, CP, progressive SOB, dizziness, or lethargy. Pt has a h/o Bronchitis, he is a nonsmoker        ROS    Unless otherwise stated in this report or unable to obtain because of the patient's clinical or mental status as evidenced by the medical record, this patients's positive and negative responses for Review of Systems, constitutional, psych, eyes, ENT, cardiovascular, respiratory, gastrointestinal, neurological, genitourinary, musculoskeletal, integument systems and systems related to the presenting problem are either stated in the preceding or were not pertinent or were negative for the symptoms and/or complaints related to the medical problem. Past Surgical History:  has a past surgical history that includes hernia repair and knee surgery (Left). Social History:  reports that he quit smoking about 11 years ago. His smoking use included cigarettes. He has never used smokeless tobacco. He reports that he does not drink alcohol and does not use drugs. Family History: family history is not on file. Allergies: Erythromycin    Physical Exam         VS:  /80   Pulse 68   Temp 98.1 °F (36.7 °C) (Temporal)    Oxygen Saturation Interpretation: Normal.    Constitutional:  Alert, development consistent with age. Ears:  External Ears: Normal bilateral pinna. TM's & External Canals: TM's normal bilaterally without perforation. Canals without erythema or drainage.     Nose:   There is no obvious septal defect. Moderate redness/edema  Mouth:  Moist bucca mucosa and normal tongue. Throat: Mild posterior pharyngeal erythema without exudates or lesions. Neck:  Supple. There is no obvious adenopathy or neck tenderness. Lungs:   Breath sounds: Normal chest expansion and breath sounds: Diffuse wheezing bilaterally  Heart:  Regular rate and rhythm, normal heart sounds, without pathological murmurs, ectopy, gallops, or rubs. Skin:  Normal turgor. Warm, dry, without visible rash. Neurological:  Oriented. Motor functions intact. Lab / Imaging Results   (All laboratory and radiology results have been personally reviewed by myself)  Labs:  No results found for this visit on 01/03/23. Imaging: All Radiology results interpreted by Radiologist unless otherwise noted. No orders to display         Assessment / Plan     Impression(s):  1. Sinobronchitis      Disposition:  Disposition: Advised Covid and Influenza A? B are negative, Chest xray today. Decadron 10 mg IM now. Start both Zithromax and Cefdinir as ordered, start Prednisone Taper.  Stay well hydrated return to walk in care w/any worsening or concerning medical issues

## 2023-02-10 ENCOUNTER — OFFICE VISIT (OUTPATIENT)
Dept: PRIMARY CARE CLINIC | Age: 48
End: 2023-02-10
Payer: COMMERCIAL

## 2023-02-10 VITALS
DIASTOLIC BLOOD PRESSURE: 80 MMHG | HEART RATE: 70 BPM | BODY MASS INDEX: 28.69 KG/M2 | TEMPERATURE: 97.7 F | WEIGHT: 211.8 LBS | SYSTOLIC BLOOD PRESSURE: 124 MMHG | HEIGHT: 72 IN | OXYGEN SATURATION: 99 % | RESPIRATION RATE: 16 BRPM

## 2023-02-10 DIAGNOSIS — S59.902A INJURY OF LEFT ELBOW, INITIAL ENCOUNTER: Primary | ICD-10-CM

## 2023-02-10 DIAGNOSIS — M25.522 ELBOW PAIN, LEFT: ICD-10-CM

## 2023-02-10 PROCEDURE — 99213 OFFICE O/P EST LOW 20 MIN: CPT | Performed by: NURSE PRACTITIONER

## 2023-02-10 RX ORDER — DEXAMETHASONE SODIUM PHOSPHATE 10 MG/ML
10 INJECTION INTRAMUSCULAR; INTRAVENOUS ONCE
Status: COMPLETED | OUTPATIENT
Start: 2023-02-10 | End: 2023-02-10

## 2023-02-10 RX ORDER — PREDNISONE 10 MG/1
10 TABLET ORAL 2 TIMES DAILY
Qty: 10 TABLET | Refills: 0 | Status: SHIPPED | OUTPATIENT
Start: 2023-02-10 | End: 2023-02-15

## 2023-02-10 RX ADMIN — DEXAMETHASONE SODIUM PHOSPHATE 10 MG: 10 INJECTION INTRAMUSCULAR; INTRAVENOUS at 09:59

## 2023-02-10 SDOH — ECONOMIC STABILITY: FOOD INSECURITY: WITHIN THE PAST 12 MONTHS, YOU WORRIED THAT YOUR FOOD WOULD RUN OUT BEFORE YOU GOT MONEY TO BUY MORE.: NEVER TRUE

## 2023-02-10 SDOH — ECONOMIC STABILITY: INCOME INSECURITY: HOW HARD IS IT FOR YOU TO PAY FOR THE VERY BASICS LIKE FOOD, HOUSING, MEDICAL CARE, AND HEATING?: NOT HARD AT ALL

## 2023-02-10 SDOH — ECONOMIC STABILITY: FOOD INSECURITY: WITHIN THE PAST 12 MONTHS, THE FOOD YOU BOUGHT JUST DIDN'T LAST AND YOU DIDN'T HAVE MONEY TO GET MORE.: NEVER TRUE

## 2023-02-10 SDOH — ECONOMIC STABILITY: HOUSING INSECURITY
IN THE LAST 12 MONTHS, WAS THERE A TIME WHEN YOU DID NOT HAVE A STEADY PLACE TO SLEEP OR SLEPT IN A SHELTER (INCLUDING NOW)?: NO

## 2023-02-10 ASSESSMENT — PATIENT HEALTH QUESTIONNAIRE - PHQ9
9. THOUGHTS THAT YOU WOULD BE BETTER OFF DEAD, OR OF HURTING YOURSELF: 0
7. TROUBLE CONCENTRATING ON THINGS, SUCH AS READING THE NEWSPAPER OR WATCHING TELEVISION: 0
SUM OF ALL RESPONSES TO PHQ9 QUESTIONS 1 & 2: 0
10. IF YOU CHECKED OFF ANY PROBLEMS, HOW DIFFICULT HAVE THESE PROBLEMS MADE IT FOR YOU TO DO YOUR WORK, TAKE CARE OF THINGS AT HOME, OR GET ALONG WITH OTHER PEOPLE: 0
4. FEELING TIRED OR HAVING LITTLE ENERGY: 0
3. TROUBLE FALLING OR STAYING ASLEEP: 0
SUM OF ALL RESPONSES TO PHQ QUESTIONS 1-9: 0
6. FEELING BAD ABOUT YOURSELF - OR THAT YOU ARE A FAILURE OR HAVE LET YOURSELF OR YOUR FAMILY DOWN: 0
2. FEELING DOWN, DEPRESSED OR HOPELESS: 0
8. MOVING OR SPEAKING SO SLOWLY THAT OTHER PEOPLE COULD HAVE NOTICED. OR THE OPPOSITE, BEING SO FIGETY OR RESTLESS THAT YOU HAVE BEEN MOVING AROUND A LOT MORE THAN USUAL: 0
5. POOR APPETITE OR OVEREATING: 0
SUM OF ALL RESPONSES TO PHQ QUESTIONS 1-9: 0
1. LITTLE INTEREST OR PLEASURE IN DOING THINGS: 0

## 2023-02-10 NOTE — PROGRESS NOTES
Chief Complaint:  Elbow Injury (LEFT ELBOW PAIN--Hit funny bone about 3 months ago)      History of Present Illness:  Source of history provided by:  patient. Arron Duque is a 41 y.o. old male presenting to the [facility] care with left elbow pain which occurred 3 months ago. Pt accidentally hit his left elbow and immediately felt a radiating pain to hs forearm. The pain is aggravated by movement. Pt states the pain in the elbow does radiate to FA    Denies any N/T, redness or swelling to left elbow, hand weakness, or associated CP. Review of Systems:  Unless otherwise stated in this report or unable to obtain because of the patient's clinical or mental status as evidenced by the medical record, this patients's positive and negative responses for Review of Systems, constitutional, psych, eyes, ENT, cardiovascular, respiratory, gastrointestinal, neurological, genitourinary, musculoskeletal, integument systems and systems related to the presenting problem are either stated in the preceding or were not pertinent or were negative for the symptoms and/or complaints related to the medical problem. Past Medical History:  has a past medical history of Anxiety and H/O Meniere's disease. Past Surgical History:  has a past surgical history that includes hernia repair and knee surgery (Left). Social History:  reports that he quit smoking about 11 years ago. His smoking use included cigarettes. He has never used smokeless tobacco. He reports that he does not drink alcohol and does not use drugs. Family History: family history is not on file. Allergies: Erythromycin    Physical Exam:  (Vital signs reviewed) Resp 16   Ht 6' (1.829 m)   Wt 211 lb 12.8 oz (96.1 kg)   BMI 28.73 kg/m²   Oxygen Saturation Interpretation: Normal.  Constitutional:  Alert, development consistent with age. Neck:  Normal ROM. Supple. Non-tender. HEENT: Head NC/NT. External ears normal.  Eyes PERRL.   Throat without erythema. Chest: Heart RRR without pathological murmur or gallop. Lungs CTA A and P without W/R/R. Left Elbow:               Tenderness: TTP over lateral epicondyle            Swelling: No obvious swelling noted. Deformity: No obvious deformity. ROM: Limited ROM due to pain. Skin:  No abrasions, bruising, or erythema noted. Neurovascular:              Sensory deficit: Sensation intact proximally and distally to the injury site. Pulse deficit: Distal pulses 2+ and bounding. Capillary refill: Less then 2 sec throughout. Neurological: Alert and oriented. Motor functions intact.      -------------------------------------------Test Results Section---------------------------------------------  (All laboratory and radiology results have been personally reviewed by myself)    Radiology: All Radiology results interpreted by Radiologist unless otherwise noted. ------------------------------------Impression & Disposition Section------------------------------------  Impression:  1. Injury of left elbow, initial encounter    2. Elbow pain, left        Disposition:  Disposition: Left Elbow Xray today, Decadron 10 mg IM now, start Prednisone tomorrow. Further treatment plan will be based on xray once received.

## 2023-03-12 ENCOUNTER — E-VISIT (OUTPATIENT)
Dept: PRIMARY CARE CLINIC | Age: 48
End: 2023-03-12
Payer: COMMERCIAL

## 2023-03-12 DIAGNOSIS — S91.309A OPEN WOUND OF FOOT, UNSPECIFIED LATERALITY, INITIAL ENCOUNTER: Primary | ICD-10-CM

## 2023-03-12 PROCEDURE — 99422 OL DIG E/M SVC 11-20 MIN: CPT | Performed by: NURSE PRACTITIONER

## 2023-03-12 RX ORDER — SULFAMETHOXAZOLE AND TRIMETHOPRIM 800; 160 MG/1; MG/1
1 TABLET ORAL 2 TIMES DAILY
Qty: 14 TABLET | Refills: 0 | Status: SHIPPED | OUTPATIENT
Start: 2023-03-12 | End: 2023-03-19

## 2023-03-12 RX ORDER — FLUCONAZOLE 100 MG/1
100 TABLET ORAL DAILY
Qty: 7 TABLET | Refills: 0 | Status: SHIPPED | OUTPATIENT
Start: 2023-03-12 | End: 2023-03-19

## 2023-03-12 NOTE — PROGRESS NOTES
Reviewed questionnaire and photos    Reviewed meds/allergies    Dx Open wound of foot    Plan Rx given for bactrim and diflucan, follow up with PCP for further management and evaluation    Time spent on visit 15 min

## 2023-03-13 ENCOUNTER — OFFICE VISIT (OUTPATIENT)
Dept: FAMILY MEDICINE CLINIC | Age: 48
End: 2023-03-13
Payer: COMMERCIAL

## 2023-03-13 VITALS
SYSTOLIC BLOOD PRESSURE: 120 MMHG | TEMPERATURE: 96.8 F | DIASTOLIC BLOOD PRESSURE: 78 MMHG | WEIGHT: 214 LBS | BODY MASS INDEX: 28.99 KG/M2 | HEART RATE: 93 BPM | OXYGEN SATURATION: 96 % | HEIGHT: 72 IN

## 2023-03-13 DIAGNOSIS — S53.402D SPRAIN OF LEFT ELBOW, SUBSEQUENT ENCOUNTER: ICD-10-CM

## 2023-03-13 DIAGNOSIS — B35.3 TINEA PEDIS OF BOTH FEET: Primary | ICD-10-CM

## 2023-03-13 DIAGNOSIS — L29.9 PRURITUS: ICD-10-CM

## 2023-03-13 PROCEDURE — 99213 OFFICE O/P EST LOW 20 MIN: CPT | Performed by: FAMILY MEDICINE

## 2023-03-13 RX ORDER — PREDNISONE 5 MG/1
TABLET ORAL
Qty: 21 EACH | Refills: 0 | Status: SHIPPED | OUTPATIENT
Start: 2023-03-13

## 2023-03-13 RX ORDER — HYDROXYZINE HYDROCHLORIDE 25 MG/1
25 TABLET, FILM COATED ORAL EVERY 8 HOURS PRN
Qty: 30 TABLET | Refills: 0 | Status: SHIPPED | OUTPATIENT
Start: 2023-03-13 | End: 2023-03-23

## 2023-03-13 NOTE — PROGRESS NOTES
Adeola Neville (:  1975) is a 41 y.o. male,Established patient, here for evaluation of the following chief complaint(s):            ASSESSMENT/PLAN:  1. Tinea pedis of both feet  -     nystatin-triamcinolone (MYCOLOG II) 190483-3.1 UNIT/GM-% cream; Apply topically 2 times daily. , Disp-60 g, R-1, Normal  2. Pruritus  -     hydrOXYzine HCl (ATARAX) 25 MG tablet; Take 1 tablet by mouth every 8 hours as needed for Itching, Disp-30 tablet, R-0Normal  3. Sprain of left elbow, subsequent encounter  -     predniSONE 5 MG (21) TBPK; Take 6 pills on day 1, 5 pills on day 2,4 pills on day 3, 3 pills on day 4, 2 pills on day 5, 1 pill on day 6., Disp-21 each, R-0Print    No follow-ups on file. Subjective   SUBJECTIVE/OBJECTIVE:  HPI    Review of Systems   HENT:  Negative for dental problem. Musculoskeletal:  Positive for arthralgias (left elbow from  sudden impact). Objective   /78   Pulse 93   Temp 96.8 °F (36 °C) (Temporal)   Ht 6' (1.829 m)   Wt 214 lb (97.1 kg)   SpO2 96%   BMI 29.02 kg/m²   No results found for: LABA1C  Physical Exam  Musculoskeletal:      Comments: Pain on twisting the annular ligament, pronationand supination. Neg tinells   Skin:     Comments: Fungal on feet          On this date 3/13/2023 I have spent 21 minutes reviewing previous notes, test results and face to face with the patient discussing the diagnosis and importance of compliance with the treatment plan as well as documenting on the day of the visit. An electronic signature was used to authenticate this note.     --Erasmo Mcnally DO

## 2023-03-27 ENCOUNTER — OFFICE VISIT (OUTPATIENT)
Dept: FAMILY MEDICINE CLINIC | Age: 48
End: 2023-03-27
Payer: COMMERCIAL

## 2023-03-27 VITALS
HEART RATE: 77 BPM | HEIGHT: 72 IN | OXYGEN SATURATION: 95 % | SYSTOLIC BLOOD PRESSURE: 132 MMHG | TEMPERATURE: 97.9 F | RESPIRATION RATE: 16 BRPM | WEIGHT: 220.6 LBS | DIASTOLIC BLOOD PRESSURE: 76 MMHG | BODY MASS INDEX: 29.88 KG/M2

## 2023-03-27 DIAGNOSIS — Z12.11 COLON CANCER SCREENING: ICD-10-CM

## 2023-03-27 DIAGNOSIS — R73.09 ELEVATED GLUCOSE: Primary | ICD-10-CM

## 2023-03-27 DIAGNOSIS — B35.9 TINEA: ICD-10-CM

## 2023-03-27 DIAGNOSIS — M25.522 LEFT ELBOW PAIN: ICD-10-CM

## 2023-03-27 LAB — HBA1C MFR BLD: 5.4 %

## 2023-03-27 PROCEDURE — 83036 HEMOGLOBIN GLYCOSYLATED A1C: CPT | Performed by: FAMILY MEDICINE

## 2023-03-27 PROCEDURE — 99213 OFFICE O/P EST LOW 20 MIN: CPT | Performed by: FAMILY MEDICINE

## 2023-03-27 RX ORDER — FLUCONAZOLE 100 MG/1
100 TABLET ORAL DAILY
Qty: 10 TABLET | Refills: 0 | Status: SHIPPED | OUTPATIENT
Start: 2023-03-27 | End: 2023-04-06

## 2023-03-27 ASSESSMENT — PATIENT HEALTH QUESTIONNAIRE - PHQ9
SUM OF ALL RESPONSES TO PHQ9 QUESTIONS 1 & 2: 5
2. FEELING DOWN, DEPRESSED OR HOPELESS: 3
10. IF YOU CHECKED OFF ANY PROBLEMS, HOW DIFFICULT HAVE THESE PROBLEMS MADE IT FOR YOU TO DO YOUR WORK, TAKE CARE OF THINGS AT HOME, OR GET ALONG WITH OTHER PEOPLE: 1
7. TROUBLE CONCENTRATING ON THINGS, SUCH AS READING THE NEWSPAPER OR WATCHING TELEVISION: 2
5. POOR APPETITE OR OVEREATING: 0
SUM OF ALL RESPONSES TO PHQ QUESTIONS 1-9: 0
SUM OF ALL RESPONSES TO PHQ QUESTIONS 1-9: 0
SUM OF ALL RESPONSES TO PHQ QUESTIONS 1-9: 10
3. TROUBLE FALLING OR STAYING ASLEEP: 0
SUM OF ALL RESPONSES TO PHQ9 QUESTIONS 1 & 2: 0
1. LITTLE INTEREST OR PLEASURE IN DOING THINGS: 2
2. FEELING DOWN, DEPRESSED OR HOPELESS: 0
8. MOVING OR SPEAKING SO SLOWLY THAT OTHER PEOPLE COULD HAVE NOTICED. OR THE OPPOSITE, BEING SO FIGETY OR RESTLESS THAT YOU HAVE BEEN MOVING AROUND A LOT MORE THAN USUAL: 0
SUM OF ALL RESPONSES TO PHQ QUESTIONS 1-9: 10
4. FEELING TIRED OR HAVING LITTLE ENERGY: 3
SUM OF ALL RESPONSES TO PHQ QUESTIONS 1-9: 10
1. LITTLE INTEREST OR PLEASURE IN DOING THINGS: 0
SUM OF ALL RESPONSES TO PHQ QUESTIONS 1-9: 0
9. THOUGHTS THAT YOU WOULD BE BETTER OFF DEAD, OR OF HURTING YOURSELF: 0
SUM OF ALL RESPONSES TO PHQ QUESTIONS 1-9: 0
6. FEELING BAD ABOUT YOURSELF - OR THAT YOU ARE A FAILURE OR HAVE LET YOURSELF OR YOUR FAMILY DOWN: 0
SUM OF ALL RESPONSES TO PHQ QUESTIONS 1-9: 10

## 2023-03-27 NOTE — PROGRESS NOTES
than 2 seconds. Coloration: Skin is not pale. Findings: No bruising. Neurological:      Mental Status: He is alert. Psychiatric:         Mood and Affect: Mood normal.          On this date 3/27/2023 I have spent 23 minutes reviewing previous notes, test results and face to face with the patient discussing the diagnosis and importance of compliance with the treatment plan as well as documenting on the day of the visit. An electronic signature was used to authenticate this note.     --Harden Poonam, DO

## 2023-04-05 DIAGNOSIS — B35.9 TINEA: ICD-10-CM

## 2023-04-06 RX ORDER — FLUCONAZOLE 100 MG/1
100 TABLET ORAL DAILY
Qty: 10 TABLET | Refills: 0 | OUTPATIENT
Start: 2023-04-06 | End: 2023-04-16

## 2023-07-07 ENCOUNTER — OFFICE VISIT (OUTPATIENT)
Dept: FAMILY MEDICINE CLINIC | Age: 48
End: 2023-07-07
Payer: COMMERCIAL

## 2023-07-07 VITALS
BODY MASS INDEX: 30.24 KG/M2 | SYSTOLIC BLOOD PRESSURE: 118 MMHG | HEART RATE: 65 BPM | WEIGHT: 223 LBS | OXYGEN SATURATION: 98 % | DIASTOLIC BLOOD PRESSURE: 80 MMHG | TEMPERATURE: 98.2 F

## 2023-07-07 DIAGNOSIS — B35.3 TINEA PEDIS OF BOTH FEET: ICD-10-CM

## 2023-07-07 DIAGNOSIS — R73.09 ELEVATED GLUCOSE: ICD-10-CM

## 2023-07-07 DIAGNOSIS — L30.9 DERMATITIS: Primary | ICD-10-CM

## 2023-07-07 DIAGNOSIS — L29.9 PRURITUS: ICD-10-CM

## 2023-07-07 LAB
ALBUMIN SERPL-MCNC: 4.9 G/DL (ref 3.5–5.2)
ALP SERPL-CCNC: 136 U/L (ref 40–129)
ALT SERPL-CCNC: 28 U/L (ref 0–40)
ANION GAP SERPL CALCULATED.3IONS-SCNC: 10 MMOL/L (ref 7–16)
AST SERPL-CCNC: 28 U/L (ref 0–39)
BILIRUB SERPL-MCNC: 0.3 MG/DL (ref 0–1.2)
BUN SERPL-MCNC: 11 MG/DL (ref 6–20)
CALCIUM SERPL-MCNC: 10.4 MG/DL (ref 8.6–10.2)
CHLORIDE SERPL-SCNC: 102 MMOL/L (ref 98–107)
CO2 SERPL-SCNC: 27 MMOL/L (ref 22–29)
CREAT SERPL-MCNC: 0.9 MG/DL (ref 0.7–1.2)
GLUCOSE SERPL-MCNC: 71 MG/DL (ref 74–99)
POTASSIUM SERPL-SCNC: 5.5 MMOL/L (ref 3.5–5)
PROT SERPL-MCNC: 7.7 G/DL (ref 6.4–8.3)
SODIUM SERPL-SCNC: 139 MMOL/L (ref 132–146)

## 2023-07-07 PROCEDURE — 99213 OFFICE O/P EST LOW 20 MIN: CPT | Performed by: NURSE PRACTITIONER

## 2023-07-07 RX ORDER — TERBINAFINE HYDROCHLORIDE 250 MG/1
250 TABLET ORAL DAILY
COMMUNITY

## 2023-07-07 RX ORDER — HYDROXYZINE HYDROCHLORIDE 25 MG/1
25 TABLET, FILM COATED ORAL EVERY 8 HOURS PRN
Qty: 30 TABLET | Refills: 0 | Status: SHIPPED | OUTPATIENT
Start: 2023-07-07 | End: 2023-07-17

## 2023-07-11 ENCOUNTER — OFFICE VISIT (OUTPATIENT)
Dept: FAMILY MEDICINE CLINIC | Age: 48
End: 2023-07-11
Payer: COMMERCIAL

## 2023-07-11 VITALS
BODY MASS INDEX: 30.48 KG/M2 | DIASTOLIC BLOOD PRESSURE: 72 MMHG | HEIGHT: 72 IN | RESPIRATION RATE: 16 BRPM | WEIGHT: 225 LBS | TEMPERATURE: 98.7 F | SYSTOLIC BLOOD PRESSURE: 120 MMHG | OXYGEN SATURATION: 98 % | HEART RATE: 92 BPM

## 2023-07-11 DIAGNOSIS — L30.9 DERMATITIS: Primary | ICD-10-CM

## 2023-07-11 DIAGNOSIS — E87.5 HYPERKALEMIA: ICD-10-CM

## 2023-07-11 PROCEDURE — 99213 OFFICE O/P EST LOW 20 MIN: CPT | Performed by: NURSE PRACTITIONER

## 2023-07-11 RX ORDER — PREDNISONE 5 MG/1
TABLET ORAL
Qty: 1 EACH | Refills: 0 | Status: SHIPPED | OUTPATIENT
Start: 2023-07-11

## 2023-07-11 ASSESSMENT — ENCOUNTER SYMPTOMS
DIARRHEA: 0
BACK PAIN: 0
WHEEZING: 0
VOMITING: 0
NAUSEA: 0
NAUSEA: 0
BACK PAIN: 0
SHORTNESS OF BREATH: 0
DIARRHEA: 0
COUGH: 0
VOMITING: 0
CONSTIPATION: 1
SHORTNESS OF BREATH: 1
COUGH: 0
CONSTIPATION: 0
WHEEZING: 0

## 2023-07-11 ASSESSMENT — PATIENT HEALTH QUESTIONNAIRE - PHQ9
1. LITTLE INTEREST OR PLEASURE IN DOING THINGS: 1
SUM OF ALL RESPONSES TO PHQ QUESTIONS 1-9: 2
SUM OF ALL RESPONSES TO PHQ9 QUESTIONS 1 & 2: 2
SUM OF ALL RESPONSES TO PHQ QUESTIONS 1-9: 2
2. FEELING DOWN, DEPRESSED OR HOPELESS: 1

## 2023-07-12 ENCOUNTER — HOSPITAL ENCOUNTER (OUTPATIENT)
Age: 48
Discharge: HOME | End: 2023-07-12
Payer: COMMERCIAL

## 2023-07-12 DIAGNOSIS — E87.5 HYPERKALEMIA: ICD-10-CM

## 2023-07-12 LAB
ANION GAP SERPL CALCULATED.3IONS-SCNC: 10 MMOL/L (ref 7–16)
BUN SERPL-MCNC: 11 MG/DL (ref 6–20)
CALCIUM SERPL-MCNC: 9.9 MG/DL (ref 8.6–10.2)
CHLORIDE SERPL-SCNC: 101 MMOL/L (ref 98–107)
CO2 SERPL-SCNC: 28 MMOL/L (ref 22–29)
CREAT SERPL-MCNC: 1 MG/DL (ref 0.7–1.2)
GLUCOSE SERPL-MCNC: 108 MG/DL (ref 74–99)
POTASSIUM SERPL-SCNC: 4.7 MMOL/L (ref 3.5–5)
SODIUM SERPL-SCNC: 139 MMOL/L (ref 132–146)

## 2023-07-12 PROCEDURE — 80048 BASIC METABOLIC PNL TOTAL CA: CPT

## 2023-07-12 PROCEDURE — 36415 COLL VENOUS BLD VENIPUNCTURE: CPT

## 2023-07-15 ENCOUNTER — OFFICE VISIT (OUTPATIENT)
Dept: PRIMARY CARE CLINIC | Age: 48
End: 2023-07-15
Payer: MEDICARE

## 2023-07-15 VITALS
BODY MASS INDEX: 22.9 KG/M2 | DIASTOLIC BLOOD PRESSURE: 72 MMHG | WEIGHT: 160 LBS | HEART RATE: 92 BPM | HEIGHT: 70 IN | SYSTOLIC BLOOD PRESSURE: 123 MMHG | OXYGEN SATURATION: 96 % | TEMPERATURE: 98.6 F

## 2023-07-15 DIAGNOSIS — L03.011 CELLULITIS OF FINGER OF RIGHT HAND: Primary | ICD-10-CM

## 2023-07-15 PROCEDURE — 99203 OFFICE O/P NEW LOW 30 MIN: CPT | Performed by: NURSE PRACTITIONER

## 2023-07-15 PROCEDURE — 3074F SYST BP LT 130 MM HG: CPT | Performed by: NURSE PRACTITIONER

## 2023-07-15 PROCEDURE — 3078F DIAST BP <80 MM HG: CPT | Performed by: NURSE PRACTITIONER

## 2023-07-15 RX ORDER — TAMSULOSIN HYDROCHLORIDE 0.4 MG/1
1 CAPSULE ORAL NIGHTLY
COMMUNITY

## 2023-07-15 RX ORDER — CLINDAMYCIN HYDROCHLORIDE 300 MG/1
300 CAPSULE ORAL 3 TIMES DAILY
Qty: 30 CAPSULE | Refills: 0 | Status: SHIPPED | OUTPATIENT
Start: 2023-07-15 | End: 2023-07-25

## 2023-07-15 ASSESSMENT — ENCOUNTER SYMPTOMS
EYES NEGATIVE: 1
RESPIRATORY NEGATIVE: 1
GASTROINTESTINAL NEGATIVE: 1

## 2023-07-15 ASSESSMENT — PATIENT HEALTH QUESTIONNAIRE - PHQ9: DEPRESSION UNABLE TO ASSESS: URGENT/EMERGENT SITUATION

## 2023-07-15 NOTE — PROGRESS NOTES
Date/Time    WBC 7.7 05/11/2022 03:20 PM    NEUTROABS 3.6 05/11/2022 03:20 PM    HGB 18.2 05/11/2022 03:20 PM    HCT 51.7 05/11/2022 03:20 PM    MCV 91.3 05/11/2022 03:20 PM     05/11/2022 03:20 PM     Lab Results   Component Value Date    TSH 1.35 08/19/2015       ASSESSMENT/PLAN:     1. Cellulitis of finger of right hand  Medication as directed. Eat when taking meds. Increase oral intake of fluids. Do not cut open. Wear gloves when working. Follow up if symptoms fail to get better or get worse.         Orders Placed This Encounter   Medications    clindamycin (CLEOCIN) 300 MG capsule     Sig: Take 1 capsule by mouth 3 times daily for 10 days     Dispense:  30 capsule     Refill:  0        Electronically signed by ALISHA Cramer CNP on 7/15/2023 at 1:25 PM

## 2023-09-15 ENCOUNTER — TELEMEDICINE (OUTPATIENT)
Dept: FAMILY MEDICINE CLINIC | Age: 48
End: 2023-09-15
Payer: COMMERCIAL

## 2023-09-15 DIAGNOSIS — U07.1 COVID: Primary | ICD-10-CM

## 2023-09-15 DIAGNOSIS — R06.02 SOB (SHORTNESS OF BREATH): ICD-10-CM

## 2023-09-15 PROCEDURE — 99213 OFFICE O/P EST LOW 20 MIN: CPT | Performed by: NURSE PRACTITIONER

## 2023-09-15 RX ORDER — BUDESONIDE AND FORMOTEROL FUMARATE DIHYDRATE 80; 4.5 UG/1; UG/1
2 AEROSOL RESPIRATORY (INHALATION) 2 TIMES DAILY
Qty: 1 EACH | Refills: 0 | Status: SHIPPED | OUTPATIENT
Start: 2023-09-15

## 2023-09-15 RX ORDER — ALBUTEROL SULFATE 90 UG/1
2 AEROSOL, METERED RESPIRATORY (INHALATION) 4 TIMES DAILY PRN
Qty: 1 EACH | Refills: 1 | Status: SHIPPED | OUTPATIENT
Start: 2023-09-15

## 2023-09-15 ASSESSMENT — ENCOUNTER SYMPTOMS
COUGH: 1
BACK PAIN: 1
CONSTIPATION: 0
VOMITING: 0
SINUS PRESSURE: 0
DIARRHEA: 0
SORE THROAT: 0
NAUSEA: 0
WHEEZING: 1
SHORTNESS OF BREATH: 1
CHEST TIGHTNESS: 1

## 2023-09-15 ASSESSMENT — PATIENT HEALTH QUESTIONNAIRE - PHQ9
SUM OF ALL RESPONSES TO PHQ9 QUESTIONS 1 & 2: 0
1. LITTLE INTEREST OR PLEASURE IN DOING THINGS: 0
SUM OF ALL RESPONSES TO PHQ QUESTIONS 1-9: 0
8. MOVING OR SPEAKING SO SLOWLY THAT OTHER PEOPLE COULD HAVE NOTICED. OR THE OPPOSITE, BEING SO FIGETY OR RESTLESS THAT YOU HAVE BEEN MOVING AROUND A LOT MORE THAN USUAL: 0
7. TROUBLE CONCENTRATING ON THINGS, SUCH AS READING THE NEWSPAPER OR WATCHING TELEVISION: 0
1. LITTLE INTEREST OR PLEASURE IN DOING THINGS: 0
2. FEELING DOWN, DEPRESSED OR HOPELESS: 0
9. THOUGHTS THAT YOU WOULD BE BETTER OFF DEAD, OR OF HURTING YOURSELF: 0
6. FEELING BAD ABOUT YOURSELF - OR THAT YOU ARE A FAILURE OR HAVE LET YOURSELF OR YOUR FAMILY DOWN: 0
SUM OF ALL RESPONSES TO PHQ QUESTIONS 1-9: 0
4. FEELING TIRED OR HAVING LITTLE ENERGY: 0
SUM OF ALL RESPONSES TO PHQ QUESTIONS 1-9: 0
SUM OF ALL RESPONSES TO PHQ QUESTIONS 1-9: 0
5. POOR APPETITE OR OVEREATING: 0
10. IF YOU CHECKED OFF ANY PROBLEMS, HOW DIFFICULT HAVE THESE PROBLEMS MADE IT FOR YOU TO DO YOUR WORK, TAKE CARE OF THINGS AT HOME, OR GET ALONG WITH OTHER PEOPLE: 0
3. TROUBLE FALLING OR STAYING ASLEEP: 0
SUM OF ALL RESPONSES TO PHQ9 QUESTIONS 1 & 2: 0
2. FEELING DOWN, DEPRESSED OR HOPELESS: 0

## 2023-09-15 NOTE — PROGRESS NOTES
No significant exanthematous lesions or discoloration noted on facial skin         [] Abnormal -            Psychiatric:       [x] Normal Affect [] Abnormal -        [x] No Hallucinations    Other pertinent observable physical exam findings:-         MDM Low    --ADAN Cronin - CNP

## 2023-09-19 ENCOUNTER — OFFICE VISIT (OUTPATIENT)
Dept: FAMILY MEDICINE CLINIC | Age: 48
End: 2023-09-19
Payer: COMMERCIAL

## 2023-09-19 VITALS
TEMPERATURE: 97.6 F | DIASTOLIC BLOOD PRESSURE: 78 MMHG | HEIGHT: 72 IN | OXYGEN SATURATION: 98 % | WEIGHT: 223 LBS | HEART RATE: 86 BPM | SYSTOLIC BLOOD PRESSURE: 122 MMHG | BODY MASS INDEX: 30.2 KG/M2

## 2023-09-19 DIAGNOSIS — R06.2 WHEEZING: ICD-10-CM

## 2023-09-19 DIAGNOSIS — S53.402D SPRAIN OF LEFT ELBOW, SUBSEQUENT ENCOUNTER: ICD-10-CM

## 2023-09-19 DIAGNOSIS — R06.02 SOB (SHORTNESS OF BREATH): ICD-10-CM

## 2023-09-19 DIAGNOSIS — J40 SINOBRONCHITIS: ICD-10-CM

## 2023-09-19 DIAGNOSIS — U07.1 COVID-19 VIRUS INFECTION: Primary | ICD-10-CM

## 2023-09-19 DIAGNOSIS — J32.9 SINOBRONCHITIS: ICD-10-CM

## 2023-09-19 PROCEDURE — 99213 OFFICE O/P EST LOW 20 MIN: CPT | Performed by: NURSE PRACTITIONER

## 2023-09-19 RX ORDER — DOXYCYCLINE HYCLATE 100 MG
100 TABLET ORAL 2 TIMES DAILY
Qty: 20 TABLET | Refills: 0 | Status: SHIPPED | OUTPATIENT
Start: 2023-09-19 | End: 2023-09-29

## 2023-09-19 RX ORDER — PREDNISONE 5 MG/1
TABLET ORAL
Qty: 21 EACH | Refills: 0 | Status: SHIPPED | OUTPATIENT
Start: 2023-09-19

## 2023-09-19 ASSESSMENT — ENCOUNTER SYMPTOMS
VOMITING: 0
SHORTNESS OF BREATH: 1
SORE THROAT: 0
CHEST TIGHTNESS: 1
CONSTIPATION: 0
BACK PAIN: 1
SINUS PRESSURE: 1
WHEEZING: 1
COUGH: 1
DIARRHEA: 0
RHINORRHEA: 1
NAUSEA: 1

## 2023-09-19 NOTE — PROGRESS NOTES
Tatum Godinez (:  1975) is a 41 y.o. male,Established patient, here for evaluation of the following chief complaint(s):  Congestion (Pt having chest congestion)         ASSESSMENT/PLAN:  1. COVID-19 virus infection  -     doxycycline hyclate (VIBRA-TABS) 100 MG tablet; Take 1 tablet by mouth 2 times daily for 10 days Take with full glass of water, Disp-20 tablet, R-0Normal  -     XR CHEST (2 VW); Future  2. SOB (shortness of breath)  -     predniSONE 5 MG (21) TBPK; Take 6 pills on day 1, 5 pills on day 2,4 pills on day 3, 3 pills on day 4, 2 pills on day 5, 1 pill on day 6., Disp-21 each, R-0Normal  -     XR CHEST (2 VW); Future  3. Wheezing  -     predniSONE 5 MG (21) TBPK; Take 6 pills on day 1, 5 pills on day 2,4 pills on day 3, 3 pills on day 4, 2 pills on day 5, 1 pill on day 6., Disp-21 each, R-0Normal  -     XR CHEST (2 VW); Future  4. Sprain of left elbow, subsequent encounter  -     predniSONE 5 MG (21) TBPK; Take 6 pills on day 1, 5 pills on day 2,4 pills on day 3, 3 pills on day 4, 2 pills on day 5, 1 pill on day 6., Disp-21 each, R-0Normal  5. Sinobronchitis  -     doxycycline hyclate (VIBRA-TABS) 100 MG tablet; Take 1 tablet by mouth 2 times daily for 10 days Take with full glass of water, Disp-20 tablet, R-0Normal  -     predniSONE 5 MG (21) TBPK; Take 6 pills on day 1, 5 pills on day 2,4 pills on day 3, 3 pills on day 4, 2 pills on day 5, 1 pill on day 6., Disp-21 each, R-0Normal  -  Reviewed side effects of medication and patient verbalizes understanding.   -  Advised to call back directly if there are further questions, or if these symptoms fail to improve as anticipated or worsen. -  take full course of ATX  -  CXR if no improvement in 24-48 hours  -  will call for work excuse    Upper respiratory infection, viral  COVID is a viral infection. Antibiotics are not helpful for viral infections however you may have an opportunistic bacterial infection at this time.  Continue to treat the

## 2023-09-25 ENCOUNTER — TELEPHONE (OUTPATIENT)
Dept: FAMILY MEDICINE CLINIC | Age: 48
End: 2023-09-25

## 2023-09-25 NOTE — TELEPHONE ENCOUNTER
Spoke to Radha to let him know that Sumit Serrano will review this tomorrow and we'll call him to let him know when the letter is ready.

## 2023-09-25 NOTE — TELEPHONE ENCOUNTER
----- Message from Bartolo Interiano sent at 9/25/2023  2:54 PM EDT -----  Subject: Message to Provider    QUESTIONS  Information for Provider? Pt is calling in stating he needs an excuse for   his work. Pt has covid and needs the dates to be 9/8/2023to 9/22/2023. Pt   would like this faxed to [phone]? Bing  ---------------------------------------------------------------------------  --------------  Johanne RYAN  [phone]; OK to leave message on voicemail  ---------------------------------------------------------------------------  --------------  SCRIPT ANSWERS  Relationship to Patient?  Self

## 2023-10-09 ENCOUNTER — OFFICE VISIT (OUTPATIENT)
Dept: NEUROLOGY | Facility: CLINIC | Age: 48
End: 2023-10-09
Payer: MEDICARE

## 2023-10-09 VITALS
WEIGHT: 155.8 LBS | DIASTOLIC BLOOD PRESSURE: 80 MMHG | SYSTOLIC BLOOD PRESSURE: 124 MMHG | BODY MASS INDEX: 22.3 KG/M2 | TEMPERATURE: 97.1 F | HEART RATE: 87 BPM | OXYGEN SATURATION: 97 % | HEIGHT: 70 IN

## 2023-10-09 DIAGNOSIS — G43.711 INTRACTABLE CHRONIC MIGRAINE WITHOUT AURA AND WITH STATUS MIGRAINOSUS: ICD-10-CM

## 2023-10-09 DIAGNOSIS — Z87.898 HISTORY OF SEIZURE: Primary | ICD-10-CM

## 2023-10-09 DIAGNOSIS — F10.90 CHRONIC ALCOHOL USE: ICD-10-CM

## 2023-10-09 DIAGNOSIS — Z87.820 HISTORY OF TRAUMATIC BRAIN INJURY: ICD-10-CM

## 2023-10-09 DIAGNOSIS — G89.29 OTHER CHRONIC PAIN: ICD-10-CM

## 2023-10-09 PROCEDURE — 1159F MED LIST DOCD IN RCRD: CPT | Performed by: NURSE PRACTITIONER

## 2023-10-09 PROCEDURE — 1160F RVW MEDS BY RX/DR IN RCRD: CPT | Performed by: NURSE PRACTITIONER

## 2023-10-09 PROCEDURE — 99204 OFFICE O/P NEW MOD 45 MIN: CPT | Performed by: NURSE PRACTITIONER

## 2023-10-09 PROCEDURE — 3079F DIAST BP 80-89 MM HG: CPT | Performed by: NURSE PRACTITIONER

## 2023-10-09 PROCEDURE — 3074F SYST BP LT 130 MM HG: CPT | Performed by: NURSE PRACTITIONER

## 2023-10-09 RX ORDER — ATOGEPANT 60 MG/1
60 TABLET ORAL DAILY
Qty: 16 TABLET | Refills: 0 | COMMUNITY
Start: 2023-10-09

## 2023-10-09 RX ORDER — AMITRIPTYLINE HYDROCHLORIDE 25 MG/1
25 TABLET, FILM COATED ORAL NIGHTLY
COMMUNITY

## 2023-10-09 RX ORDER — LANOLIN ALCOHOL/MO/W.PET/CERES
100 CREAM (GRAM) TOPICAL DAILY
Qty: 90 TABLET | Refills: 3 | Status: SHIPPED | OUTPATIENT
Start: 2023-10-09

## 2023-10-09 RX ORDER — ATOGEPANT 60 MG/1
60 TABLET ORAL DAILY
Qty: 30 TABLET | Refills: 5 | Status: SHIPPED | OUTPATIENT
Start: 2023-10-09

## 2023-10-09 NOTE — PROGRESS NOTES
"     New Patient Office Visit      Patient Name: Douglas Tan  : 1975   MRN: [id number]     Referring Physician: Krysten Desai*    Chief Complaint:    Chief Complaint   Patient presents with    Consult     NP, in office to establish care for seizures.        History of Present Illness: Douglas Tan is a 47 y.o. male who is here today to establish care with Neurology for history of seizures and was last seen on 10/19/2017 by Dr. HUMA Young, neurology.  His most recent seizure was about 3 years ago.  He did not have seizures as a child.  Seizures began after his TBI in .  He has had alcohol withdrawal seizures in the past.  Drinking at least 18 beers daily but is actively cutting back on his daily alcohol intake.  Smoking marijuana multiple times per week.  He says he is drinking daily because he is in so much pain- head pain, neck pain, numbness and pain in his hands (left worse right), low back pain that radiates down into his legs- has had this chronic pain since .  Has a headache every day and states, \"The pain goes from one part to another, all over my body\".  Describes headaches as all over and radiating down neck and into arms, aching and stabbing, throbbing, accompanied by phonophobia, accompanied by nausea, lasting more than 4 hours.  Previously treated for chronic pain with Oxycodone, Fentanyl, Hydrocodone, Morphine, and Opana ER.  Additional risk factors- history of TBI in , history of substance abuse (clean for a little over 2 years), history of chronic alcohol use.    *CT head without contrast in  was noncontributory.  Unknown when last EEG was done.  MRI brain without contrast in  was noncontributory.   *Previously took Depakote, possibly Phenobarbital, Topiramate, Sumatriptan without relief, Rizatriptan without relief, and Gabapentin for seizure disorder and/or headaches.  Currently taking Amitriptyline for sleep.     Following taken from previous visit " note with Dr. 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 He had stopped Xanax several days before his seizure. Denies prior h/o seizures, alcohol abuse. He was prescribed Keppra in the ER that he stopped taking. Complains of increased frequency of headaches since his seizures. He wants Fioricet with codeine for headaches, followed by pain management, has a contract for opiates.     Following taken from most recent visit note with Dr. HUMA Young on 3/16/2019:  Patient c/o left thigh numbness and discomfort that developed about 2 months ago, does not radiate below the knee. He is in pain management, on oxycodone. He was taken off gabapentin because of intentional drug overdose.        Review of Systems:   Review of Systems   Musculoskeletal:  Positive for back pain and neck pain.   Neurological:  Positive for seizures, numbness and headache.       Past Medical History:   Past Medical History:   Diagnosis Date    Alcohol abuse     Anxiety     Arthritis     Back pain, chronic     Carpal tunnel syndrome     Depression     Diverticulosis     Fibromyalgia     Fractures     GERD (gastroesophageal reflux disease)     Hiatal hernia     History of blood transfusion     Hypertension     pt states no longer has it, was from anxiety attacks.     Migraine     Shoulder injury     Substance abuse     Tendonitis     Tobacco abuse     Traumatic brain injury 1999       Past Surgical History:   Past Surgical History:   Procedure Laterality Date    COLONOSCOPY  2019    EYE SURGERY Left 02/1999    reconstruction    EYE SURGERY      reconstruction of left eye     SPINAL CORD STIMULATOR IMPLANT N/A 5/16/2017    Procedure: INTRATHECAL CATHETER PLACEMENT ;  Surgeon: Aristides Zafar MD;  [facility]: [facility];  Service:     TRACHEOSTOMY      WISDOM TOOTH EXTRACTION         Family History:   Family History   Problem Relation Age  of Onset    Arthritis Mother     Other Mother         Malignant neoplasm    Arthritis Other     Other Other         Malignant neoplasm    Hypertension Other     Alcohol abuse Father     Arthritis Other     Cancer Other        Social History:   Social History     Socioeconomic History    Marital status:    Tobacco Use    Smoking status: Former     Packs/day: 1.00     Years: 20.00     Additional pack years: 0.00     Total pack years: 20.00     Types: Electronic Cigarette, Cigarettes    Smokeless tobacco: Former    Tobacco comments:     quit in 2021   Vaping Use    Vaping Use: Every day    Substances: Nicotine    Devices: Refillable tank   Substance and Sexual Activity    Alcohol use: Yes     Alcohol/week: 18.0 standard drinks of alcohol     Types: 18 Cans of beer per week     Comment: see below - 1 pint of vodka per day    Drug use: Not Currently     Types: Marijuana, Methamphetamines     Comment: alcohol    Sexual activity: Not Currently       Medications:     Current Outpatient Medications:     tamsulosin (FLOMAX) 0.4 MG capsule 24 hr capsule, Take 1 capsule by mouth Every Night. Indications: Benign Enlargement of Prostate, Disp: , Rfl:     amitriptyline (ELAVIL) 25 MG tablet, Take 1 tablet by mouth Every Night. Indications: sleep, Disp: , Rfl:     Atogepant (Qulipta) 60 MG tablet, Take 1 tablet by mouth Daily., Disp: 30 tablet, Rfl: 5    Atogepant (Qulipta) 60 MG tablet, Take 1 tablet by mouth Daily., Disp: 16 tablet, Rfl: 0    metoprolol tartrate (LOPRESSOR) 25 MG tablet, Take 0.5 tablets by mouth Daily. Indications: High Blood Pressure Disorder, Disp: , Rfl:     thiamine (VITAMIN B1) 100 MG tablet, Take 1 tablet by mouth Daily., Disp: 90 tablet, Rfl: 3    Allergies:   Allergies   Allergen Reactions    Shellfish-Derived Products Arrhythmia    Sulfa Antibiotics Swelling    Duloxetine Hcl Urinary Retention     \    Penicillins Rash    Venlafaxine Nausea And Vomiting       Objective     Physical Exam:  Vital  "Signs:   Vitals:    10/09/23 1509   BP: 124/80   BP Location: Right arm   Patient Position: Sitting   Cuff Size: Adult   Pulse: 87   Temp: 97.1 øF (36.2 øC)   SpO2: 97%   Weight: 70.7 kg (155 lb 12.8 oz)   Height: 177.8 cm (70\")   PainSc:   7   PainLoc: Shoulder  Comment: right shoulder, mid and lower back pain     Body mass index is 22.35 kg/mý.     Physical Exam  Vitals and nursing note reviewed.   Constitutional:       General: He is not in acute distress.     Appearance: Normal appearance. He is well-developed and normal weight. He is not diaphoretic.   HENT:      Head: Normocephalic.      Comments: Scar to left side head with slight indention.   Eyes:      Extraocular Movements: Extraocular movements intact.      Conjunctiva/sclera: Conjunctivae normal.      Pupils: Pupils are equal, round, and reactive to light.   Pulmonary:      Effort: Pulmonary effort is normal. No respiratory distress.   Musculoskeletal:         General: Normal range of motion.   Skin:     General: Skin is warm and dry.   Neurological:      Mental Status: He is alert and oriented to person, place, and time.      Cranial Nerves: Cranial nerves 2-12 are intact.      Coordination: Finger-Nose-Finger Test normal.      Gait: Gait is intact.   Psychiatric:         Mood and Affect: Mood normal.         Behavior: Behavior normal.         Thought Content: Thought content normal.         Judgment: Judgment normal.         Neurologic Exam     Mental Status   Oriented to person, place, and time.   Level of consciousness: alert    Cranial Nerves   Cranial nerves II through XII intact.     CN II   Visual fields full to confrontation.     CN III, IV, VI   Pupils are equal, round, and reactive to light.  CN III: no CN III palsy  CN VI: no CN VI palsy  Nystagmus: none     CN V   Facial sensation intact.     CN VII   Facial expression full, symmetric.     CN IX, X   CN IX normal.   CN X normal.     CN XI   CN XI normal.     CN XII   CN XII normal.     Motor " Exam   Muscle bulk: normal  Overall muscle tone: normal    Strength   Right biceps: 5/5  Left biceps: 5/5  Right triceps: 5/5  Left triceps: 5/5  Right quadriceps: 5/5  Left quadriceps: 5/5  Right hamstrin/5  Left hamstrin/5    Sensory Exam   Light touch normal.   Proprioception normal.     Gait, Coordination, and Reflexes     Gait  Gait: normal    Coordination   Finger to nose coordination: normal    Tremor   Resting tremor: absent  Intention tremor: absent  Action tremor: absent      Assessment / Plan      Assessment/Plan:   Diagnoses and all orders for this visit:    1. History of seizure (Primary)  -     MRI Brain With & Without Contrast; Future  -     EEG; Future    2. History of traumatic brain injury  -     MRI Brain With & Without Contrast; Future  -     EEG; Future    3. Other chronic pain    4. Intractable chronic migraine without aura and with status migrainosus  -     Atogepant (Qulipta) 60 MG tablet; Take 1 tablet by mouth Daily.  Dispense: 30 tablet; Refill: 5  -     Atogepant (Qulipta) 60 MG tablet; Take 1 tablet by mouth Daily.  Dispense: 16 tablet; Refill: 0    5. Chronic alcohol use  -     thiamine (VITAMIN B1) 100 MG tablet; Take 1 tablet by mouth Daily.  Dispense: 90 tablet; Refill: 3    6. BMI 22.0-22.9, adult    *Patient education on Seizures provided today. Seizure precautions discussed and encouraged.   *I have advised patient of [place] laws regarding seizures and driving- no driving for 90 days following a seizure.    *Indications and possible SEs of Qulipta discussed with patient.   *I have encouraged patient to continue cutting back on his daily alcohol intake slowly.   *Thiamine daily.     Follow Up:   Return in about 3 months (around 2024) for Follow Up.    NICOLE Machuca, FNP-C  [facility]

## 2023-11-21 ENCOUNTER — OFFICE VISIT (OUTPATIENT)
Dept: GASTROENTEROLOGY | Facility: CLINIC | Age: 48
End: 2023-11-21
Payer: MEDICARE

## 2023-11-21 VITALS
HEART RATE: 86 BPM | WEIGHT: 147 LBS | SYSTOLIC BLOOD PRESSURE: 136 MMHG | OXYGEN SATURATION: 98 % | DIASTOLIC BLOOD PRESSURE: 80 MMHG | BODY MASS INDEX: 21.09 KG/M2

## 2023-11-21 DIAGNOSIS — K70.0 ALCOHOLIC FATTY LIVER: ICD-10-CM

## 2023-11-21 DIAGNOSIS — R11.2 NAUSEA AND VOMITING, UNSPECIFIED VOMITING TYPE: ICD-10-CM

## 2023-11-21 DIAGNOSIS — R74.8 ELEVATED LIVER ENZYMES: Primary | ICD-10-CM

## 2023-11-21 DIAGNOSIS — F10.20 UNCOMPLICATED ALCOHOL DEPENDENCE: ICD-10-CM

## 2023-11-21 DIAGNOSIS — Z12.11 COLON CANCER SCREENING: ICD-10-CM

## 2023-11-21 RX ORDER — SODIUM CHLORIDE 9 MG/ML
30 INJECTION, SOLUTION INTRAVENOUS CONTINUOUS PRN
OUTPATIENT
Start: 2023-11-21

## 2023-11-21 RX ORDER — PROMETHAZINE HYDROCHLORIDE 12.5 MG/1
12.5 TABLET ORAL EVERY 6 HOURS PRN
Qty: 30 TABLET | Refills: 1 | Status: SHIPPED | OUTPATIENT
Start: 2023-11-21

## 2023-11-21 RX ORDER — PANTOPRAZOLE SODIUM 40 MG/1
40 TABLET, DELAYED RELEASE ORAL 2 TIMES DAILY
Qty: 60 TABLET | Refills: 2 | Status: SHIPPED | OUTPATIENT
Start: 2023-11-21

## 2023-11-21 NOTE — PROGRESS NOTES
New Patient Consult      Date: 2023   Patient Name: Douglas Tan  MRN: [id number]  : 1975     Primary Care Provider: Nataliya Diggs APRN  Referring Provider: Lloyd    Chief Complaint   Patient presents with    Elevated Hepatic Enzymes     History of Present Illness: Douglas Tan is a 47 y.o. male who is here today as a consultation with Gastroenterology for evaluation of Elevated Hepatic Enzymes.    He has been drinking daily since nearly age 15. His father shot himself when he was 15 and he feels that his drinking began around that time. Reports having 12-18 16 oz beers daily, rarely liquor now. Previously drank vodka daily, stopped 1 year ago. He drank only 3 beers yesterday due to illness.     Has vomiting daily, has difficulty eating, has not been able to eat food for days at a time. Generally with loose stools. No current PPI treatment. No NSAIDs. He is a daily smoker. No rectal bleeding. He previously abused opioids, has been to both drug rehab and alcohol rehab in the past. No longer using illicit drugs.     No known prior history of known liver disease, reports not getting health care for years. Did have colonoscopy in the past, does not recall specifics. [place] [facility] [place].     Subjective      Past Medical History:   Diagnosis Date    Alcohol abuse     Anxiety     Arthritis     Back pain, chronic     Carpal tunnel syndrome     Depression     Diverticulosis     Fibromyalgia     Fractures     GERD (gastroesophageal reflux disease)     Hiatal hernia     History of blood transfusion     Hypertension     pt states no longer has it, was from anxiety attacks.     Migraine     Shoulder injury     Substance abuse     Tendonitis     Tobacco abuse     Traumatic brain injury      Past Surgical History:   Procedure Laterality Date    COLONOSCOPY  2019    EYE SURGERY Left 1999    reconstruction    EYE SURGERY       reconstruction of left eye     SPINAL CORD STIMULATOR IMPLANT N/A 5/16/2017    Procedure: INTRATHECAL CATHETER PLACEMENT ;  Surgeon: Aristides Zafar MD;  Location: [facility];  Service:     TRACHEOSTOMY      WISDOM TOOTH EXTRACTION       Family History   Problem Relation Age of Onset    Arthritis Mother     Other Mother         Malignant neoplasm    Arthritis Other     Other Other         Malignant neoplasm    Hypertension Other     Alcohol abuse Father     Arthritis Other     Cancer Other      Social History     Socioeconomic History    Marital status:    Tobacco Use    Smoking status: Former     Packs/day: 1.00     Years: 20.00     Additional pack years: 0.00     Total pack years: 20.00     Types: Electronic Cigarette, Cigarettes    Smokeless tobacco: Former    Tobacco comments:     quit in 2021   Vaping Use    Vaping Use: Every day    Substances: Nicotine    Devices: Refillable tank   Substance and Sexual Activity    Alcohol use: Yes     Alcohol/week: 18.0 standard drinks of alcohol     Types: 18 Cans of beer per week     Comment: see below - 1 pint of vodka per day    Drug use: Not Currently     Types: Marijuana, Methamphetamines     Comment: alcohol    Sexual activity: Not Currently     Current Outpatient Medications:     Atogepant (Qulipta) 60 MG tablet, Take 1 tablet by mouth Daily., Disp: 30 tablet, Rfl: 5    metoprolol tartrate (LOPRESSOR) 25 MG tablet, Take 0.5 tablets by mouth Daily. Indications: High Blood Pressure Disorder, Disp: , Rfl:     tamsulosin (FLOMAX) 0.4 MG capsule 24 hr capsule, Take 1 capsule by mouth Every Night. Indications: Benign Enlargement of Prostate, Disp: , Rfl:     thiamine (VITAMIN B1) 100 MG tablet, Take 1 tablet by mouth Daily., Disp: 90 tablet, Rfl: 3    Allergies   Allergen Reactions    Shellfish-Derived Products Arrhythmia    Sulfa Antibiotics Swelling    Duloxetine Hcl Urinary Retention     \    Penicillins Rash    Venlafaxine Nausea And Vomiting     The following  portions of the patient's history were reviewed and updated as appropriate: allergies, current medications, past family history, past medical history, past social history, past surgical history and problem list.    Objective     Physical Exam  Vitals reviewed.   Constitutional:       General: He is not in acute distress.     Appearance: Normal appearance. He is well-developed. He is not ill-appearing or diaphoretic.   HENT:      Head: Normocephalic and atraumatic.      Right Ear: External ear normal.      Left Ear: External ear normal.      Nose: Nose normal.   Eyes:      General: No scleral icterus.        Right eye: No discharge.         Left eye: No discharge.      Conjunctiva/sclera: Conjunctivae normal.      Comments: Amblyopia, left   Neck:      Vascular: No JVD.   Cardiovascular:      Rate and Rhythm: Normal rate and regular rhythm.      Heart sounds: Normal heart sounds. No murmur heard.     No friction rub. No gallop.   Pulmonary:      Effort: Pulmonary effort is normal. No respiratory distress.      Breath sounds: Normal breath sounds. No wheezing or rales.   Chest:      Chest wall: No tenderness.   Abdominal:      General: Bowel sounds are normal. There is no distension.      Palpations: Abdomen is soft. There is no mass.      Tenderness: There is abdominal tenderness (epigastric and LLQ (mild)). There is no guarding.   Musculoskeletal:         General: No deformity. Normal range of motion.      Cervical back: Normal range of motion.   Skin:     General: Skin is warm and dry.      Findings: No erythema or rash.   Neurological:      Mental Status: He is alert and oriented to person, place, and time.      Coordination: Coordination normal.   Psychiatric:         Mood and Affect: Mood normal.         Behavior: Behavior normal.         Thought Content: Thought content normal.         Judgment: Judgment normal.       Vitals:    11/21/23 1013   BP: 136/80   Pulse: 86   SpO2: 98%   Weight: 66.7 kg (147 lb)      Results Review:   I have reviewed the patient's new clinical and imaging results.    Admission on 04/06/2022, Discharged on 04/06/2022   Component Date Value Ref Range Status    Glucose 04/06/2022 86  65 - 99 mg/dL Final    BUN 04/06/2022 6  6 - 20 mg/dL Final    Creatinine 04/06/2022 0.83  0.76 - 1.27 mg/dL Final    Sodium 04/06/2022 140  136 - 145 mmol/L Final    Potassium 04/06/2022 3.9  3.5 - 5.2 mmol/L Final    Chloride 04/06/2022 100  98 - 107 mmol/L Final    CO2 04/06/2022 13.1 (L)  22.0 - 29.0 mmol/L Final    Calcium 04/06/2022 9.9  8.6 - 10.5 mg/dL Final    Total Protein 04/06/2022 6.9  6.0 - 8.5 g/dL Final    Albumin 04/06/2022 4.50  3.50 - 5.20 g/dL Final    ALT (SGPT) 04/06/2022 87 (H)  1 - 41 U/L Final    AST (SGOT) 04/06/2022 62 (H)  1 - 40 U/L Final    Alkaline Phosphatase 04/06/2022 124 (H)  39 - 117 U/L Final    Total Bilirubin 04/06/2022 1.2  0.0 - 1.2 mg/dL Final    Globulin 04/06/2022 2.4  gm/dL Final    A/G Ratio 04/06/2022 1.9  g/dL Final    BUN/Creatinine Ratio 04/06/2022 7.2  7.0 - 25.0 Final    Anion Gap 04/06/2022 26.9 (H)  5.0 - 15.0 mmol/L Final    eGFR 04/06/2022 110.7  >60.0 mL/min/1.73 Final    National Kidney Foundation and American Society of Nephrology (ASN) Task Force recommended calculation based on the Chronic Kidney Disease Epidemiology Collaboration (CKD-EPI) equation refit without adjustment for race.    Troponin T 04/06/2022 <0.010  0.000 - 0.030 ng/mL Final    WBC 04/06/2022 10.32  3.40 - 10.80 10*3/mm3 Final    RBC 04/06/2022 4.79  4.14 - 5.80 10*6/mm3 Final    Hemoglobin 04/06/2022 16.2  13.0 - 17.7 g/dL Final    Hematocrit 04/06/2022 44.6  37.5 - 51.0 % Final    MCV 04/06/2022 93.1  79.0 - 97.0 fL Final    MCH 04/06/2022 33.8 (H)  26.6 - 33.0 pg Final    MCHC 04/06/2022 36.3 (H)  31.5 - 35.7 g/dL Final    RDW 04/06/2022 15.2  12.3 - 15.4 % Final    RDW-SD 04/06/2022 52.5  37.0 - 54.0 fl Final    MPV 04/06/2022 8.9  6.0 - 12.0 fL Final    Platelets 04/06/2022 418  140  - 450 10*3/mm3 Final    THC, Screen, Urine 04/06/2022 Negative  Negative Final    Phencyclidine (PCP), Urine 04/06/2022 Negative  Negative Final    Cocaine Screen, Urine 04/06/2022 Negative  Negative Final    Methamphetamine, Ur 04/06/2022 Positive (A)  Negative Final    Opiate Screen 04/06/2022 Negative  Negative Final    Amphetamine Screen, Urine 04/06/2022 Positive (A)  Negative Final    Benzodiazepine Screen, Urine 04/06/2022 Negative  Negative Final    Tricyclic Antidepressants Screen 04/06/2022 Negative  Negative Final    Methadone Screen, Urine 04/06/2022 Negative  Negative Final    Barbiturates Screen, Urine 04/06/2022 Negative  Negative Final    Oxycodone Screen, Urine 04/06/2022 Negative  Negative Final    Propoxyphene Screen 04/06/2022 Negative  Negative Final    Buprenorphine, Screen, Urine 04/06/2022 Positive (A)  Negative Final      Labs 9/2023 , total cholesterol 194, LDL cholesterol 99, albumin 4.9, alk phos 94, , , bili 0.5, Cr 0.76, glucose 81, Na 139, Hgb 14.6, HCT 41.2, MCV 98, platelets 200,000, WBC 5.2.    CTAP with contrast 4/2022  FINDINGS:   ABDOMEN: The lung bases are clear. The heart is normal in size. The  liver is diffusely hypodense consistent with fatty infiltration. The  spleen is unremarkable. No adrenal mass is present.  The pancreas is  normal. The kidneys are normal. The aorta is normal in caliber. There is  no free fluid or adenopathy. The abdominal portions of the GI tract are  unremarkable with no evidence of obstruction.  PELVIS: The appendix is normal.  There is diffuse wall thickening  involving the urinary bladder which may reflect cystitis. There is no  significant free fluid or adenopathy.  IMPRESSION:  Diffuse wall thickening involving the urinary bladder which  may reflect cystitis. Clinical correlation is needed.     Assessment / Plan      1. Elevated liver enzymes  2. Alcoholic fatty liver  3. Uncomplicated alcohol dependence  4. Nausea and  vomiting, unspecified vomiting type  He has had persistently elevated liver enzymes since early 2022. Had intermittent elevations prior to that which were typical of alcohol use with AST:ALT ratio 2:1. Recent labs 9/2023 , , alk phos 94, bili 0.5. Platelets have been normal. No imaging findings of cirrhosis. Admits to continuing heavy alcohol use with 12-18 beers per day (16 oz each). He previously drank liquor daily along with the beer but has cut back since 1 year ago. He has tried stopping alcohol on his own in the past and has been to rehab and detox as well. CTAP 4/2022 showed diffuse fatty liver. He has been having vomiting daily and has not been able to take in much food for days at a time. No current PPI, does have some reflux. Nausea is constant. He has lost approx 20 lbs since 3 years ago. No prior EGD. Suspect he has alcohol/smoking related upper GI complaints, possible PUD.     US abd ordered  Labs  EGD to be arranged  Work on cutting back and stopping alcohol, consider inpt rehab  Start protonix 40 mg BID   Start Phenergan PRN nausea/vomiting  Report to ED with any severe complaints    - US Abdomen Complete; Future    - Comprehensive Metabolic Panel; Future  - CBC (No Diff); Future  - Protime-INR; Future    - pantoprazole (PROTONIX) 40 MG EC tablet; Take 1 tablet by mouth 2 (Two) Times a Day.  Dispense: 60 tablet; Refill: 2  - promethazine (PHENERGAN) 12.5 MG tablet; Take 1 tablet by mouth Every 6 (Six) Hours As Needed for Nausea or Vomiting.  Dispense: 30 tablet; Refill: 1    He will need an esophagogastroduodenoscopy performed with monitored anesthesia care. The indications, technique, alternatives and potential risk and complications were discussed with the patient including but not limited to bleeding, intestinal perforations, missing lesions and anesthetic complications. The patient understands and wishes to proceed with the procedure and has given their verbal consent. Written  patient education information was given to the patient. He should follow up in the office after this procedure to discuss the results and further recommendations can be made at that time. The patient will call if they have further questions before procedure.  - Case Request    5. Colon cancer screening  Did have colonoscopy in the past, does not recall specifics. [place] [facility] [place] but no findings on file. No family history of colon cancer.     Will request previous colonoscopy report for review    Follow Up:   Return for follow up after procedure to discuss results.    Mayte Fuchs PA-C  [facility]  11/22/2023  15:54 EST    Dictated Utilizing Dragon Dictation: Part of this note may be an electronic transcription/translation of spoken language to printed text using the Dragon Dictation System.

## 2023-11-22 PROBLEM — Z78.9 ALCOHOL USE: Status: ACTIVE | Noted: 2023-11-21

## 2023-11-22 PROBLEM — R11.2 NAUSEA AND VOMITING: Status: ACTIVE | Noted: 2023-11-21

## 2023-12-12 ENCOUNTER — HOSPITAL ENCOUNTER (OUTPATIENT)
Dept: SLEEP MEDICINE | Facility: HOSPITAL | Age: 48
Discharge: HOME | End: 2023-12-12
Admitting: NURSE PRACTITIONER
Payer: MEDICARE

## 2023-12-12 ENCOUNTER — LAB (OUTPATIENT)
Dept: LAB | Facility: HOSPITAL | Age: 48
End: 2023-12-12
Payer: MEDICARE

## 2023-12-12 ENCOUNTER — HOSPITAL ENCOUNTER (OUTPATIENT)
Dept: MRI IMAGING | Facility: HOSPITAL | Age: 48
Discharge: HOME | End: 2023-12-12
Payer: MEDICARE

## 2023-12-12 DIAGNOSIS — R74.8 ELEVATED LIVER ENZYMES: ICD-10-CM

## 2023-12-12 DIAGNOSIS — Z87.898 HISTORY OF SEIZURE: ICD-10-CM

## 2023-12-12 DIAGNOSIS — Z87.820 HISTORY OF TRAUMATIC BRAIN INJURY: ICD-10-CM

## 2023-12-12 DIAGNOSIS — K70.0 ALCOHOLIC FATTY LIVER: ICD-10-CM

## 2023-12-12 LAB
ALBUMIN SERPL-MCNC: 5.6 G/DL (ref 3.5–5.2)
ALBUMIN/GLOB SERPL: 2.8 G/DL
ALP SERPL-CCNC: 129 U/L (ref 39–117)
ALT SERPL W P-5'-P-CCNC: 363 U/L (ref 1–41)
ANION GAP SERPL CALCULATED.3IONS-SCNC: 16 MMOL/L (ref 5–15)
AST SERPL-CCNC: 513 U/L (ref 1–40)
BILIRUB SERPL-MCNC: 0.5 MG/DL (ref 0–1.2)
BUN SERPL-MCNC: 6 MG/DL (ref 6–20)
BUN/CREAT SERPL: 6.3 (ref 7–25)
CALCIUM SPEC-SCNC: 10.3 MG/DL (ref 8.6–10.5)
CHLORIDE SERPL-SCNC: 98 MMOL/L (ref 98–107)
CO2 SERPL-SCNC: 26 MMOL/L (ref 22–29)
CREAT SERPL-MCNC: 0.95 MG/DL (ref 0.76–1.27)
DEPRECATED RDW RBC AUTO: 45.9 FL (ref 37–54)
EGFRCR SERPLBLD CKD-EPI 2021: 100 ML/MIN/1.73
ERYTHROCYTE [DISTWIDTH] IN BLOOD BY AUTOMATED COUNT: 13 % (ref 12.3–15.4)
GLOBULIN UR ELPH-MCNC: 2 GM/DL
GLUCOSE SERPL-MCNC: 116 MG/DL (ref 65–99)
HCT VFR BLD AUTO: 42.6 % (ref 37.5–51)
HGB BLD-MCNC: 15.5 G/DL (ref 13–17.7)
INR PPP: 0.85 (ref 0.9–1.1)
MCH RBC QN AUTO: 35.6 PG (ref 26.6–33)
MCHC RBC AUTO-ENTMCNC: 36.4 G/DL (ref 31.5–35.7)
MCV RBC AUTO: 97.7 FL (ref 79–97)
PLATELET # BLD AUTO: 160 10*3/MM3 (ref 140–450)
PMV BLD AUTO: 9.6 FL (ref 6–12)
POTASSIUM SERPL-SCNC: 5 MMOL/L (ref 3.5–5.2)
PROT SERPL-MCNC: 7.6 G/DL (ref 6–8.5)
PROTHROMBIN TIME: 12.1 SECONDS (ref 12.3–15.1)
RBC # BLD AUTO: 4.36 10*6/MM3 (ref 4.14–5.8)
SODIUM SERPL-SCNC: 140 MMOL/L (ref 136–145)
WBC NRBC COR # BLD AUTO: 6.34 10*3/MM3 (ref 3.4–10.8)

## 2023-12-12 PROCEDURE — 95816 EEG AWAKE AND DROWSY: CPT

## 2023-12-12 PROCEDURE — 0 GADOBENATE DIMEGLUMINE 529 MG/ML SOLUTION: Performed by: NURSE PRACTITIONER

## 2023-12-12 PROCEDURE — A9577 INJ MULTIHANCE: HCPCS | Performed by: NURSE PRACTITIONER

## 2023-12-12 PROCEDURE — 85027 COMPLETE CBC AUTOMATED: CPT

## 2023-12-12 PROCEDURE — 70553 MRI BRAIN STEM W/O & W/DYE: CPT

## 2023-12-12 PROCEDURE — 95816 EEG AWAKE AND DROWSY: CPT | Performed by: PSYCHIATRY & NEUROLOGY

## 2023-12-12 PROCEDURE — 80053 COMPREHEN METABOLIC PANEL: CPT

## 2023-12-12 PROCEDURE — 36415 COLL VENOUS BLD VENIPUNCTURE: CPT

## 2023-12-12 PROCEDURE — 85610 PROTHROMBIN TIME: CPT

## 2023-12-12 RX ADMIN — GADOBENATE DIMEGLUMINE 13 ML: 529 INJECTION, SOLUTION INTRAVENOUS at 09:05

## 2023-12-13 ENCOUNTER — HOSPITAL ENCOUNTER (EMERGENCY)
Facility: HOSPITAL | Age: 48
Discharge: HOME | End: 2023-12-13
Attending: HOSPITALIST
Payer: MEDICARE

## 2023-12-13 VITALS
TEMPERATURE: 97.8 F | HEART RATE: 83 BPM | DIASTOLIC BLOOD PRESSURE: 86 MMHG | HEIGHT: 70 IN | WEIGHT: 143.3 LBS | OXYGEN SATURATION: 97 % | SYSTOLIC BLOOD PRESSURE: 122 MMHG | BODY MASS INDEX: 20.52 KG/M2 | RESPIRATION RATE: 18 BRPM

## 2023-12-13 DIAGNOSIS — F10.10 CHRONIC ALCOHOL ABUSE: Primary | ICD-10-CM

## 2023-12-13 DIAGNOSIS — F10.920 ALCOHOLIC INTOXICATION WITHOUT COMPLICATION (HCC): ICD-10-CM

## 2023-12-13 DIAGNOSIS — R11.2 NAUSEA AND VOMITING, UNSPECIFIED VOMITING TYPE: ICD-10-CM

## 2023-12-13 DIAGNOSIS — R25.1 TREMOR: ICD-10-CM

## 2023-12-13 LAB
ALBUMIN SERPL-MCNC: 4.5 G/DL (ref 3.4–4.8)
ALBUMIN/GLOB SERPL: 2.3 {RATIO} (ref 0.8–2)
ALP SERPL-CCNC: 100 U/L (ref 25–100)
ALT SERPL-CCNC: 275 U/L (ref 4–36)
ANION GAP SERPL CALCULATED.3IONS-SCNC: 14 MMOL/L (ref 3–16)
APTT BLD: 28.2 SEC (ref 22.1–34.9)
AST SERPL-CCNC: 323 U/L (ref 8–33)
BASOPHILS # BLD: 0.1 K/UL (ref 0–0.1)
BASOPHILS NFR BLD: 1.4 %
BILIRUB SERPL-MCNC: 0.7 MG/DL (ref 0.3–1.2)
BUN SERPL-MCNC: 6 MG/DL (ref 6–20)
CALCIUM SERPL-MCNC: 9.4 MG/DL (ref 8.5–10.5)
CHLORIDE SERPL-SCNC: 97 MMOL/L (ref 98–107)
CO2 SERPL-SCNC: 26 MMOL/L (ref 20–30)
CREAT SERPL-MCNC: 0.7 MG/DL (ref 0.4–1.2)
EOSINOPHIL # BLD: 0.1 K/UL (ref 0–0.4)
EOSINOPHIL NFR BLD: 1 %
ERYTHROCYTE [DISTWIDTH] IN BLOOD BY AUTOMATED COUNT: 13.9 % (ref 11–16)
ETHANOLAMINE SERPL-MCNC: 145 MG/DL (ref 0–0.08)
GFR SERPLBLD CREATININE-BSD FMLA CKD-EPI: >60 ML/MIN/{1.73_M2}
GLOBULIN SER CALC-MCNC: 2 G/DL
GLUCOSE SERPL-MCNC: 119 MG/DL (ref 74–106)
HCT VFR BLD AUTO: 39.1 % (ref 40–54)
HGB BLD-MCNC: 14 G/DL (ref 13–18)
IMM GRANULOCYTES # BLD: 0 K/UL
IMM GRANULOCYTES NFR BLD: 0.4 % (ref 0–5)
INR PPP: 0.91 (ref 0.87–1.11)
LIPASE SERPL-CCNC: 62 U/L (ref 5.6–51.3)
LYMPHOCYTES # BLD: 0.9 K/UL (ref 1.5–4)
LYMPHOCYTES NFR BLD: 18.1 %
MCH RBC QN AUTO: 35.2 PG (ref 27–32)
MCHC RBC AUTO-ENTMCNC: 35.8 G/DL (ref 31–35)
MCV RBC AUTO: 98.2 FL (ref 80–100)
MONOCYTES # BLD: 0.4 K/UL (ref 0.2–0.8)
MONOCYTES NFR BLD: 9.1 %
NEUTROPHILS # BLD: 3.4 K/UL (ref 2–7.5)
NEUTS SEG NFR BLD: 70 %
PLATELET # BLD AUTO: 126 K/UL (ref 150–400)
PMV BLD AUTO: 9.3 FL (ref 6–10)
POTASSIUM SERPL-SCNC: 3.9 MMOL/L (ref 3.4–5.1)
PROT SERPL-MCNC: 6.5 G/DL (ref 6.4–8.3)
PROTHROMBIN TIME: 12.1 SEC (ref 11.8–14.2)
RBC # BLD AUTO: 3.98 M/UL (ref 4.5–6)
SODIUM SERPL-SCNC: 137 MMOL/L (ref 136–145)
WBC # BLD AUTO: 4.9 K/UL (ref 4–11)

## 2023-12-13 PROCEDURE — 82077 ASSAY SPEC XCP UR&BREATH IA: CPT

## 2023-12-13 PROCEDURE — 85730 THROMBOPLASTIN TIME PARTIAL: CPT

## 2023-12-13 PROCEDURE — 96374 THER/PROPH/DIAG INJ IV PUSH: CPT

## 2023-12-13 PROCEDURE — 85025 COMPLETE CBC W/AUTO DIFF WBC: CPT

## 2023-12-13 PROCEDURE — 85610 PROTHROMBIN TIME: CPT

## 2023-12-13 PROCEDURE — 36415 COLL VENOUS BLD VENIPUNCTURE: CPT

## 2023-12-13 PROCEDURE — 80053 COMPREHEN METABOLIC PANEL: CPT

## 2023-12-13 PROCEDURE — 99284 EMERGENCY DEPT VISIT MOD MDM: CPT

## 2023-12-13 PROCEDURE — 2580000003 HC RX 258: Performed by: HOSPITALIST

## 2023-12-13 PROCEDURE — 96375 TX/PRO/DX INJ NEW DRUG ADDON: CPT

## 2023-12-13 PROCEDURE — 83690 ASSAY OF LIPASE: CPT

## 2023-12-13 PROCEDURE — 6360000002 HC RX W HCPCS: Performed by: HOSPITALIST

## 2023-12-13 RX ORDER — PROMETHAZINE HYDROCHLORIDE 25 MG/1
25 TABLET ORAL EVERY 6 HOURS PRN
Qty: 10 TABLET | Refills: 0 | Status: SHIPPED | OUTPATIENT
Start: 2023-12-13 | End: 2023-12-20

## 2023-12-13 RX ORDER — LORAZEPAM 2 MG/ML
1 INJECTION INTRAMUSCULAR ONCE
Status: COMPLETED | OUTPATIENT
Start: 2023-12-13 | End: 2023-12-13

## 2023-12-13 RX ORDER — 0.9 % SODIUM CHLORIDE 0.9 %
1000 INTRAVENOUS SOLUTION INTRAVENOUS ONCE
Status: COMPLETED | OUTPATIENT
Start: 2023-12-13 | End: 2023-12-13

## 2023-12-13 RX ORDER — ONDANSETRON 2 MG/ML
4 INJECTION INTRAMUSCULAR; INTRAVENOUS ONCE
Status: COMPLETED | OUTPATIENT
Start: 2023-12-13 | End: 2023-12-13

## 2023-12-13 RX ADMIN — LORAZEPAM 1 MG: 2 INJECTION INTRAMUSCULAR; INTRAVENOUS at 11:35

## 2023-12-13 RX ADMIN — SODIUM CHLORIDE 1000 ML: 9 INJECTION, SOLUTION INTRAVENOUS at 11:34

## 2023-12-13 RX ADMIN — ONDANSETRON 4 MG: 2 INJECTION INTRAMUSCULAR; INTRAVENOUS at 11:35

## 2023-12-13 ASSESSMENT — LIFESTYLE VARIABLES
HOW OFTEN DO YOU HAVE A DRINK CONTAINING ALCOHOL: 4 OR MORE TIMES A WEEK
HOW MANY STANDARD DRINKS CONTAINING ALCOHOL DO YOU HAVE ON A TYPICAL DAY: 10 OR MORE

## 2023-12-13 ASSESSMENT — PAIN SCALES - GENERAL: PAINLEVEL_OUTOF10: 0

## 2023-12-13 ASSESSMENT — PAIN - FUNCTIONAL ASSESSMENT: PAIN_FUNCTIONAL_ASSESSMENT: NONE - DENIES PAIN

## 2023-12-13 NOTE — ED TRIAGE NOTES
Patient to ED for shaking, decreased po intake, not sleeping, vomiting, \"panic attack\" and chills. Patient starts conversation with \" I am an alcoholic\". He states he usually drinks about 18 of the 16 ounce beers daily. He has had about 1 1/2 beers this morning. Dr Caren Ordaz at bedside and asks if patient wants to get help / rehab and the patient states \"not right now\". Patient declines to see counselor while he is in the ED.

## 2023-12-13 NOTE — ED NOTES
AVS / Rx reviewed with patient, understanding verbalized. Patient states that he feels much better than when he came in. Patient asked again at discharge if he would like to speak with a counselor to get rehab / resource information and he declined, states he \"already have all that information\". Patient voices no further needs or concerns and is discharged at this time.      Tang Fang RN  12/13/23 [id number]

## 2023-12-15 ENCOUNTER — TELEPHONE (OUTPATIENT)
Dept: GASTROENTEROLOGY | Facility: CLINIC | Age: 48
End: 2023-12-15
Payer: MEDICARE

## 2023-12-15 DIAGNOSIS — R74.8 ELEVATED LIVER ENZYMES: Primary | ICD-10-CM

## 2023-12-15 DIAGNOSIS — K70.0 ALCOHOLIC FATTY LIVER: ICD-10-CM

## 2023-12-15 NOTE — TELEPHONE ENCOUNTER
Spoke with pt and advised him of liver enzymes result. He stated that he has attempted to reduce alcohol consumption. Regarding the external ED visit; pt stated ED visit was caused by consumption of 1 pint of liquor on an empty stomach which had sickened him. Advised pt to have liver enzyme draw done in approximately 1 week. Pt agreed to such.

## 2023-12-15 NOTE — TELEPHONE ENCOUNTER
Please let pt know that his liver ezymes worsened on last labs. Ask him if he has been drinking more or less since last visit? I see he went to the ED at external facility but it won't let me review the details of that visit. He should have repeat lab to check liver enzymes in 1 week. I have ordered.

## 2024-01-03 ENCOUNTER — TELEPHONE (OUTPATIENT)
Dept: GASTROENTEROLOGY | Facility: CLINIC | Age: 49
End: 2024-01-03
Payer: MEDICARE

## 2024-01-03 NOTE — TELEPHONE ENCOUNTER
Pts mother called in and stated that pt would be unavailable due to treatment for an unspecified time. Pt procedure cancelled and placed in holding for reschedule.

## 2024-03-04 ENCOUNTER — OFFICE VISIT (OUTPATIENT)
Dept: NEUROLOGY | Facility: CLINIC | Age: 49
End: 2024-03-04
Payer: MEDICARE

## 2024-03-04 VITALS
WEIGHT: 152.2 LBS | HEART RATE: 82 BPM | SYSTOLIC BLOOD PRESSURE: 130 MMHG | BODY MASS INDEX: 21.79 KG/M2 | TEMPERATURE: 97.1 F | DIASTOLIC BLOOD PRESSURE: 82 MMHG | HEIGHT: 70 IN | OXYGEN SATURATION: 98 %

## 2024-03-04 DIAGNOSIS — Z87.820 HISTORY OF TRAUMATIC BRAIN INJURY: ICD-10-CM

## 2024-03-04 DIAGNOSIS — G43.711 INTRACTABLE CHRONIC MIGRAINE WITHOUT AURA AND WITH STATUS MIGRAINOSUS: ICD-10-CM

## 2024-03-04 DIAGNOSIS — Z87.898 HISTORY OF SEIZURE: Primary | ICD-10-CM

## 2024-03-04 DIAGNOSIS — G47.09 OTHER INSOMNIA: ICD-10-CM

## 2024-03-04 DIAGNOSIS — G89.29 OTHER CHRONIC PAIN: ICD-10-CM

## 2024-03-04 DIAGNOSIS — F10.90 CHRONIC ALCOHOL USE: ICD-10-CM

## 2024-03-04 PROCEDURE — 3075F SYST BP GE 130 - 139MM HG: CPT | Performed by: NURSE PRACTITIONER

## 2024-03-04 PROCEDURE — 3079F DIAST BP 80-89 MM HG: CPT | Performed by: NURSE PRACTITIONER

## 2024-03-04 PROCEDURE — 99214 OFFICE O/P EST MOD 30 MIN: CPT | Performed by: NURSE PRACTITIONER

## 2024-03-04 PROCEDURE — 1160F RVW MEDS BY RX/DR IN RCRD: CPT | Performed by: NURSE PRACTITIONER

## 2024-03-04 PROCEDURE — 1159F MED LIST DOCD IN RCRD: CPT | Performed by: NURSE PRACTITIONER

## 2024-03-04 RX ORDER — AMITRIPTYLINE HYDROCHLORIDE 25 MG/1
25 TABLET, FILM COATED ORAL NIGHTLY
Qty: 90 TABLET | Refills: 1 | Status: SHIPPED | OUTPATIENT
Start: 2024-03-04

## 2024-03-04 NOTE — PROGRESS NOTES
"     Follow Up Office Visit      Patient Name: Douglas Tan  : 1975   MRN: [id number]     Chief Complaint:    Chief Complaint   Patient presents with    Follow-up     Patient in office to follow up on seizures.      Sleep Apnea     Patient c/o having trouble falling asleep and staying asleep. Patient stated he only sleeps about 2 hours.        History of Present Illness: Douglas Tan is a 48 y.o. male who is here today to follow up with seizures and was last seen on 10/9/2023.  He was prescribed Atogepant 60mg daily but says he only received it once from the pharmacy and is not sure it really helped with his migraines- states, \"Then I ran out and I don't pay a lot of attention to things\".  He has a daily headache, sometimes severe, and is taking Excedrin PRN.  He continues to drink alcohol daily and states, \"I have to get my house in order before I can go to rehab\".  Is not actively cutting back on his daily alcohol intake.  He thinks he will have everything together in a couple of weeks.  He has had periods of sobriety, in the past.  He doesn't think he has had any seizures since his previous visit but he isn't sure.  States, \"I just hurt so bad, I'm tired of hurting, I hurt all the time\".  He is not driving and doesn't have a license.   *MRI brain with and without contrast on 2023 showed- IMPRESSION: No acute intracranial process. Improved sinusitis and stable bilateral mastoid disease compared to prior.  *EEG on 2023 was normal.      Insomnia- He has new complaints of difficulty falling asleep and staying asleep.  Was taking Amitriptyline 25mg QHS for sleep but stopped taking that, since his previous visit. Does think that the Amitriptyline was helping.      Following taken from previous visit note:  Douglas Tan is a 47 y.o. male who is here today to establish care with Neurology for history of seizures and was last seen on 10/19/2017 by Dr. HUMA Young, neurology.  His most " "recent seizure was about 3 years ago.  He did not have seizures as a child.  Seizures began after his TBI in 1999.  He has had alcohol withdrawal seizures in the past.  Drinking at least 18 beers daily but is actively cutting back on his daily alcohol intake.  Smoking marijuana multiple times per week.  He says he is drinking daily because he is in so much pain- head pain, neck pain, numbness and pain in his hands (left worse right), low back pain that radiates down into his legs- has had this chronic pain since 1999.  Has a headache every day and states, \"The pain goes from one part to another, all over my body\".  Describes headaches as all over and radiating down neck and into arms, aching and stabbing, throbbing, accompanied by phonophobia, accompanied by nausea, lasting more than 4 hours.  Previously treated for chronic pain with Oxycodone, Fentanyl, Hydrocodone, Morphine, and Opana ER.  Additional risk factors- history of TBI in 1999, history of substance abuse (clean for a little over 2 years), history of chronic alcohol use.    *CT head without contrast in 2021 was noncontributory.  Unknown when last EEG was done.  MRI brain without contrast in 2015 was noncontributory.   *Previously took Depakote, possibly Phenobarbital, Topiramate, Sumatriptan without relief, Rizatriptan without relief, and Gabapentin for seizure disorder and/or headaches.  Currently taking Amitriptyline for sleep.      Following taken from previous visit note with Dr. 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 He had stopped Xanax several days before his seizure. Denies prior h/o seizures, alcohol abuse. He was prescribed Keppra in the ER that he stopped taking. Complains of increased frequency of headaches since his seizures. He wants Fioricet with codeine for headaches, followed by pain management, has a contract for opiates. " "    Subjective      Review of Systems:   Review of Systems   Neurological:  Positive for seizures and headache.   Psychiatric/Behavioral:  Positive for sleep disturbance.        I have reviewed and the following portions of the patient's history were updated as appropriate: past family history, past medical history, past social history, past surgical history and problem list.    Medications:     Current Outpatient Medications:     amitriptyline (ELAVIL) 25 MG tablet, Take 1 tablet by mouth Every Night. Indications: sleep, Disp: 90 tablet, Rfl: 1    metoprolol tartrate (LOPRESSOR) 25 MG tablet, Take 0.5 tablets by mouth 2 (Two) Times a Day. Indications: High Blood Pressure Disorder, Disp: , Rfl:     pantoprazole (PROTONIX) 40 MG EC tablet, Take 1 tablet by mouth 2 (Two) Times a Day., Disp: 60 tablet, Rfl: 2    promethazine (PHENERGAN) 12.5 MG tablet, Take 1 tablet by mouth Every 6 (Six) Hours As Needed for Nausea or Vomiting., Disp: 30 tablet, Rfl: 1    tamsulosin (FLOMAX) 0.4 MG capsule 24 hr capsule, Take 1 capsule by mouth Every Night. Indications: Benign Enlargement of Prostate, Disp: , Rfl:     thiamine (VITAMIN B1) 100 MG tablet, Take 1 tablet by mouth Daily., Disp: 90 tablet, Rfl: 3    Allergies:   Allergies   Allergen Reactions    Shellfish-Derived Products Arrhythmia    Sulfa Antibiotics Swelling    Duloxetine Hcl Urinary Retention     \    Penicillins Rash    Venlafaxine Nausea And Vomiting       Objective     Physical Exam:  Vital Signs:   Vitals:    03/04/24 1023   BP: 130/82   BP Location: Right arm   Patient Position: Sitting   Cuff Size: Adult   Pulse: 82   Temp: 97.1 °F (36.2 °C)   SpO2: 98%   Weight: 69 kg (152 lb 3.2 oz)   Height: 177.8 cm (70\")   PainSc:   7   PainLoc: Generalized     Body mass index is 21.84 kg/m².    Physical Exam  Vitals and nursing note reviewed.   Constitutional:       General: He is not in acute distress.     Appearance: Normal appearance. He is well-developed and normal " "weight. He is not diaphoretic.   HENT:      Head: Normocephalic and atraumatic.   Eyes:      Extraocular Movements: Extraocular movements intact.      Conjunctiva/sclera: Conjunctivae normal.   Pulmonary:      Effort: Pulmonary effort is normal. No respiratory distress.   Musculoskeletal:         General: Normal range of motion.   Skin:     General: Skin is warm and dry.   Neurological:      Mental Status: He is alert and oriented to person, place, and time.   Psychiatric:         Mood and Affect: Mood normal.         Behavior: Behavior normal.         Thought Content: Thought content normal.         Judgment: Judgment normal.         Neurologic Exam     Mental Status   Oriented to person, place, and time.        Assessment / Plan      Assessment/Plan:   Diagnoses and all orders for this visit:    1. History of seizure (Primary)    2. Chronic alcohol use    3. History of traumatic brain injury    4. Other chronic pain    5. Intractable chronic migraine without aura and with status migrainosus  -     amitriptyline (ELAVIL) 25 MG tablet; Take 1 tablet by mouth Every Night. Indications: sleep  Dispense: 90 tablet; Refill: 1    6. Other insomnia  -     amitriptyline (ELAVIL) 25 MG tablet; Take 1 tablet by mouth Every Night. Indications: sleep  Dispense: 90 tablet; Refill: 1    7. BMI 21.0-21.9, adult    *Will restart Amitriptyline and see if it helps with his sleep, headaches and pain.   *I have encouraged patient to consider a rehabilitation center for chronic alcohol use. All of his seizures have taken place in the presence of excessive alcohol use or during withdraw from alcohol.   *I have advised patient of [place] laws regarding seizures and driving- no driving for 90 days following a seizure- he doesn't drive.   *I have encouraged patient to get in to see his PCP as soon as possible- says he hasn't seen her \"In a long time\".      Follow Up:   Return in about 6 months (around 9/4/2024) for Follow Up.    Melissa FINN" NICOLE Willis, FNP-C  [facility]

## 2024-03-12 ENCOUNTER — OFFICE VISIT (OUTPATIENT)
Dept: FAMILY MEDICINE CLINIC | Age: 49
End: 2024-03-12
Payer: COMMERCIAL

## 2024-03-12 VITALS
TEMPERATURE: 98 F | BODY MASS INDEX: 28.79 KG/M2 | DIASTOLIC BLOOD PRESSURE: 64 MMHG | HEART RATE: 70 BPM | OXYGEN SATURATION: 98 % | RESPIRATION RATE: 16 BRPM | SYSTOLIC BLOOD PRESSURE: 118 MMHG | WEIGHT: 212.6 LBS | HEIGHT: 72 IN

## 2024-03-12 DIAGNOSIS — H81.01 MENIERE'S DISEASE OF RIGHT EAR: Primary | ICD-10-CM

## 2024-03-12 PROCEDURE — 99213 OFFICE O/P EST LOW 20 MIN: CPT | Performed by: NURSE PRACTITIONER

## 2024-03-12 RX ORDER — PREDNISONE 5 MG/1
TABLET ORAL
Qty: 21 EACH | Refills: 0 | Status: SHIPPED | OUTPATIENT
Start: 2024-03-12

## 2024-03-12 SDOH — ECONOMIC STABILITY: INCOME INSECURITY: HOW HARD IS IT FOR YOU TO PAY FOR THE VERY BASICS LIKE FOOD, HOUSING, MEDICAL CARE, AND HEATING?: NOT HARD AT ALL

## 2024-03-12 SDOH — ECONOMIC STABILITY: FOOD INSECURITY: WITHIN THE PAST 12 MONTHS, THE FOOD YOU BOUGHT JUST DIDN'T LAST AND YOU DIDN'T HAVE MONEY TO GET MORE.: NEVER TRUE

## 2024-03-12 SDOH — ECONOMIC STABILITY: FOOD INSECURITY: WITHIN THE PAST 12 MONTHS, YOU WORRIED THAT YOUR FOOD WOULD RUN OUT BEFORE YOU GOT MONEY TO BUY MORE.: NEVER TRUE

## 2024-03-12 ASSESSMENT — PATIENT HEALTH QUESTIONNAIRE - PHQ9
6. FEELING BAD ABOUT YOURSELF - OR THAT YOU ARE A FAILURE OR HAVE LET YOURSELF OR YOUR FAMILY DOWN: 3
4. FEELING TIRED OR HAVING LITTLE ENERGY: 3
SUM OF ALL RESPONSES TO PHQ QUESTIONS 1-9: 13
9. THOUGHTS THAT YOU WOULD BE BETTER OFF DEAD, OR OF HURTING YOURSELF: 0
3. TROUBLE FALLING OR STAYING ASLEEP: 1
SUM OF ALL RESPONSES TO PHQ QUESTIONS 1-9: 13
2. FEELING DOWN, DEPRESSED OR HOPELESS: 2
5. POOR APPETITE OR OVEREATING: 0
SUM OF ALL RESPONSES TO PHQ QUESTIONS 1-9: 13
1. LITTLE INTEREST OR PLEASURE IN DOING THINGS: 3
10. IF YOU CHECKED OFF ANY PROBLEMS, HOW DIFFICULT HAVE THESE PROBLEMS MADE IT FOR YOU TO DO YOUR WORK, TAKE CARE OF THINGS AT HOME, OR GET ALONG WITH OTHER PEOPLE: 2
SUM OF ALL RESPONSES TO PHQ9 QUESTIONS 1 & 2: 5
SUM OF ALL RESPONSES TO PHQ QUESTIONS 1-9: 13
8. MOVING OR SPEAKING SO SLOWLY THAT OTHER PEOPLE COULD HAVE NOTICED. OR THE OPPOSITE, BEING SO FIGETY OR RESTLESS THAT YOU HAVE BEEN MOVING AROUND A LOT MORE THAN USUAL: 0
7. TROUBLE CONCENTRATING ON THINGS, SUCH AS READING THE NEWSPAPER OR WATCHING TELEVISION: 1

## 2024-03-12 ASSESSMENT — ENCOUNTER SYMPTOMS
NAUSEA: 1
CONSTIPATION: 0
COUGH: 0
SHORTNESS OF BREATH: 0
RHINORRHEA: 1
DIARRHEA: 0
VOMITING: 0
WHEEZING: 0
BACK PAIN: 0

## 2024-03-12 NOTE — PROGRESS NOTES
Anibal Albarran (:  1975) is a 48 y.o. male,Established patient, here for evaluation of the following chief complaint(s):  Skin Problem (Itching/dryness during the night time hands and feet ), Discuss Medications (Menieres disease flare up- requesting steroid pack ), and Discuss Labs (Requesting testosterone levels to be checked )         ASSESSMENT/PLAN:  1. Meniere's disease of right ear  -     predniSONE 5 MG (21) TBPK; Take 6 pills on day 1, 5 pills on day 2,4 pills on day 3, 3 pills on day 4, 2 pills on day 5, 1 pill on day 6., Disp-21 each, R-0Normal  -  Reviewed side effects of medication and patient verbalizes understanding.   -  Advised to call back directly if there are further questions, or if these symptoms fail to improve as anticipated or worsen.       Return if symptoms worsen or fail to improve.         Subjective   SUBJECTIVE/OBJECTIVE:  HPI  Here today for Meniere flare. Usually happens this time of year during weather change. Has not been on steroids since 2023 after COVID for SOB. Follows with speicalist at .     Skin issues, still having same issues. Has been following with derm. Sx have not resolved even with tx from derm. Has upcoming appt.     Would like to check Testerone levels.     /64   Pulse 70   Temp 98 °F (36.7 °C)   Resp 16   Ht 1.829 m (6' 0.01\")   Wt 96.4 kg (212 lb 9.6 oz)   SpO2 98%   BMI 28.83 kg/m²     Review of Systems   Constitutional:  Positive for activity change (decreased) and fatigue. Negative for appetite change, chills, diaphoresis, fever and unexpected weight change.   HENT:  Positive for ear pain (right throbbing), postnasal drip, rhinorrhea and tinnitus. Negative for congestion.    Respiratory:  Negative for cough, shortness of breath and wheezing.    Cardiovascular:  Negative for chest pain and palpitations.   Gastrointestinal:  Positive for nausea. Negative for constipation, diarrhea and vomiting.   Genitourinary:  Negative for

## 2024-03-21 ENCOUNTER — OFFICE VISIT (OUTPATIENT)
Dept: FAMILY MEDICINE CLINIC | Age: 49
End: 2024-03-21
Payer: COMMERCIAL

## 2024-03-21 VITALS
TEMPERATURE: 98.3 F | BODY MASS INDEX: 28.99 KG/M2 | SYSTOLIC BLOOD PRESSURE: 116 MMHG | OXYGEN SATURATION: 98 % | DIASTOLIC BLOOD PRESSURE: 80 MMHG | HEIGHT: 72 IN | RESPIRATION RATE: 16 BRPM | WEIGHT: 214 LBS | HEART RATE: 78 BPM

## 2024-03-21 DIAGNOSIS — R53.83 OTHER FATIGUE: ICD-10-CM

## 2024-03-21 DIAGNOSIS — E78.00 HYPERCHOLESTEREMIA: ICD-10-CM

## 2024-03-21 DIAGNOSIS — Z23 IMMUNIZATION DUE: ICD-10-CM

## 2024-03-21 DIAGNOSIS — Z71.89 ACP (ADVANCE CARE PLANNING): ICD-10-CM

## 2024-03-21 DIAGNOSIS — R73.09 ELEVATED GLUCOSE: ICD-10-CM

## 2024-03-21 DIAGNOSIS — H81.01 MENIERE'S DISEASE OF RIGHT EAR: ICD-10-CM

## 2024-03-21 DIAGNOSIS — Z12.11 ENCOUNTER FOR SCREENING FOR MALIGNANT NEOPLASM OF COLON: ICD-10-CM

## 2024-03-21 DIAGNOSIS — E55.9 VITAMIN D DEFICIENCY: ICD-10-CM

## 2024-03-21 DIAGNOSIS — Z00.00 ENCOUNTER FOR WELL ADULT EXAM WITHOUT ABNORMAL FINDINGS: Primary | ICD-10-CM

## 2024-03-21 DIAGNOSIS — R68.82 DECREASED SEX DRIVE: ICD-10-CM

## 2024-03-21 PROCEDURE — 99396 PREV VISIT EST AGE 40-64: CPT | Performed by: NURSE PRACTITIONER

## 2024-03-21 ASSESSMENT — PATIENT HEALTH QUESTIONNAIRE - PHQ9
SUM OF ALL RESPONSES TO PHQ QUESTIONS 1-9: 16
4. FEELING TIRED OR HAVING LITTLE ENERGY: NEARLY EVERY DAY
SUM OF ALL RESPONSES TO PHQ QUESTIONS 1-9: 16
SUM OF ALL RESPONSES TO PHQ QUESTIONS 1-9: 0
7. TROUBLE CONCENTRATING ON THINGS, SUCH AS READING THE NEWSPAPER OR WATCHING TELEVISION: SEVERAL DAYS
8. MOVING OR SPEAKING SO SLOWLY THAT OTHER PEOPLE COULD HAVE NOTICED. OR THE OPPOSITE, BEING SO FIGETY OR RESTLESS THAT YOU HAVE BEEN MOVING AROUND A LOT MORE THAN USUAL: NOT AT ALL
SUM OF ALL RESPONSES TO PHQ QUESTIONS 1-9: 16
SUM OF ALL RESPONSES TO PHQ QUESTIONS 1-9: 16
SUM OF ALL RESPONSES TO PHQ QUESTIONS 1-9: 0
6. FEELING BAD ABOUT YOURSELF - OR THAT YOU ARE A FAILURE OR HAVE LET YOURSELF OR YOUR FAMILY DOWN: NEARLY EVERY DAY
5. POOR APPETITE OR OVEREATING: NEARLY EVERY DAY
SUM OF ALL RESPONSES TO PHQ9 QUESTIONS 1 & 2: 0
2. FEELING DOWN, DEPRESSED OR HOPELESS: NOT AT ALL
9. THOUGHTS THAT YOU WOULD BE BETTER OFF DEAD, OR OF HURTING YOURSELF: NOT AT ALL
SUM OF ALL RESPONSES TO PHQ9 QUESTIONS 1 & 2: 4
1. LITTLE INTEREST OR PLEASURE IN DOING THINGS: NOT AT ALL
1. LITTLE INTEREST OR PLEASURE IN DOING THINGS: MORE THAN HALF THE DAYS
10. IF YOU CHECKED OFF ANY PROBLEMS, HOW DIFFICULT HAVE THESE PROBLEMS MADE IT FOR YOU TO DO YOUR WORK, TAKE CARE OF THINGS AT HOME, OR GET ALONG WITH OTHER PEOPLE: VERY DIFFICULT
3. TROUBLE FALLING OR STAYING ASLEEP: MORE THAN HALF THE DAYS
SUM OF ALL RESPONSES TO PHQ QUESTIONS 1-9: 0
2. FEELING DOWN, DEPRESSED OR HOPELESS: MORE THAN HALF THE DAYS
SUM OF ALL RESPONSES TO PHQ QUESTIONS 1-9: 0

## 2024-03-21 ASSESSMENT — ENCOUNTER SYMPTOMS
WHEEZING: 0
SHORTNESS OF BREATH: 0
BLOOD IN STOOL: 1
ABDOMINAL PAIN: 0
COUGH: 0
CHEST TIGHTNESS: 0
VOMITING: 0
NAUSEA: 0
BACK PAIN: 0
CONSTIPATION: 0
RHINORRHEA: 1
DIARRHEA: 0

## 2024-03-21 ASSESSMENT — COLUMBIA-SUICIDE SEVERITY RATING SCALE - C-SSRS
1. WITHIN THE PAST MONTH, HAVE YOU WISHED YOU WERE DEAD OR WISHED YOU COULD GO TO SLEEP AND NOT WAKE UP?: NO
6. HAVE YOU EVER DONE ANYTHING, STARTED TO DO ANYTHING, OR PREPARED TO DO ANYTHING TO END YOUR LIFE?: NO
2. HAVE YOU ACTUALLY HAD ANY THOUGHTS OF KILLING YOURSELF?: NO

## 2024-03-21 NOTE — PATIENT INSTRUCTIONS
Advance Care Planning     Advance Care Planning opens a door to talk about and write down your wishes before a sudden accident or illness.  Make your goals, values, and preferences known.     This puts you in the ’s seat and helps others know what matters most to you so they won’t have to guess.      Where can you learn more?    Go to [email or web address]   to learn how to:    Name someone you trust to make healthcare decisions for you, only if you can’t. (Healthcare Power of )    Document your wishes for care if you were seriously ill and not expected to recover or are approaching end of life. (Advance Directive or Living Will)    The same page can be found using the QR code below.                Well Visit, Ages 18 to 65: Care Instructions  Well visits can help you stay healthy. Your doctor has checked your overall health and may have suggested ways to take good care of yourself. Your doctor also may have recommended tests. You can help prevent illness with healthy eating, good sleep, vaccinations, regular exercise, and other steps.    Get the tests that you and your doctor decide on. Depending on your age and risks, examples might include screening for diabetes; hepatitis C; HIV; and cervical, breast, lung, and colon cancer. Screening helps find diseases before any symptoms appear.   Eat healthy foods. Choose fruits, vegetables, whole grains, lean protein, and low-fat dairy foods. Limit saturated fat and reduce salt.     Limit alcohol. Men should have no more than 2 drinks a day. Women should have no more than 1. For some people, no alcohol is the best choice.   Exercise. Get at least 30 minutes of exercise on most days of the week. Walking can be a good choice.     Reach and stay at your healthy weight. This will lower your risk for many health problems.   Take care of your mental health. Try to stay connected with friends, family, and community, and find

## 2024-03-21 NOTE — PROGRESS NOTES
Well Adult Note  Name: Anibal Albarran Today’s Date: 3/21/2024   MRN: [id number] Sex: Male   Age: 48 y.o. Ethnicity: Non- / Non    : 1975 Race: White (non-)      Chief Complaint   Patient presents with    Annual Exam       Anibal Albarran is here for well adult exam.  History:  Here today for px and labs. Seen on 3/13 for meniere flare. Started on steroids. Completed and sx have improved.     Positive depression screening. Follows with psychiatry, feels managed currently, in counseling.     Review of Systems   Constitutional:  Positive for fatigue. Negative for activity change, appetite change, chills, diaphoresis, fever and unexpected weight change.        Decreased sex drive   HENT:  Positive for congestion, ear pain (right), postnasal drip and rhinorrhea.    Eyes:  Negative for visual disturbance.   Respiratory:  Negative for cough, chest tightness, shortness of breath and wheezing.    Cardiovascular:  Negative for chest pain, palpitations and leg swelling.   Gastrointestinal:  Positive for blood in stool (bright red, minimal amount with BMs). Negative for abdominal pain, constipation, diarrhea, nausea and vomiting.        Acid reflux   Endocrine: Negative for cold intolerance, heat intolerance, polydipsia and polyuria.   Genitourinary:  Negative for difficulty urinating, penile pain, penile swelling, scrotal swelling and testicular pain.   Musculoskeletal:  Negative for arthralgias, back pain and neck pain.   Skin:  Positive for rash.   Allergic/Immunologic: Positive for environmental allergies.   Neurological:  Positive for dizziness and headaches. Negative for weakness.   Psychiatric/Behavioral:  Positive for dysphoric mood. Negative for self-injury, sleep disturbance and suicidal ideas. The patient is nervous/anxious.         Controlled with current medication       Allergies   Allergen Reactions    Erythromycin Nausea And Vomiting         Prior to Visit Medications    Medication

## 2024-04-11 ENCOUNTER — HOSPITAL ENCOUNTER (OUTPATIENT)
Age: 49
Discharge: HOME | End: 2024-04-11
Payer: COMMERCIAL

## 2024-04-11 ENCOUNTER — OFFICE VISIT (OUTPATIENT)
Dept: FAMILY MEDICINE CLINIC | Age: 49
End: 2024-04-11
Payer: COMMERCIAL

## 2024-04-11 VITALS
OXYGEN SATURATION: 98 % | HEART RATE: 77 BPM | TEMPERATURE: 98.8 F | RESPIRATION RATE: 16 BRPM | DIASTOLIC BLOOD PRESSURE: 86 MMHG | BODY MASS INDEX: 28.12 KG/M2 | WEIGHT: 207.6 LBS | SYSTOLIC BLOOD PRESSURE: 110 MMHG | HEIGHT: 72 IN

## 2024-04-11 DIAGNOSIS — T75.3XXD SEVERE MOTION SICKNESS, SUBSEQUENT ENCOUNTER: ICD-10-CM

## 2024-04-11 DIAGNOSIS — R42 VERTIGO: ICD-10-CM

## 2024-04-11 DIAGNOSIS — E55.9 VITAMIN D DEFICIENCY: ICD-10-CM

## 2024-04-11 DIAGNOSIS — R73.09 ELEVATED GLUCOSE: ICD-10-CM

## 2024-04-11 DIAGNOSIS — G43.809 VESTIBULAR MIGRAINE: ICD-10-CM

## 2024-04-11 DIAGNOSIS — R19.7 DIARRHEA, UNSPECIFIED TYPE: ICD-10-CM

## 2024-04-11 DIAGNOSIS — Z12.11 ENCOUNTER FOR SCREENING FOR MALIGNANT NEOPLASM OF COLON: ICD-10-CM

## 2024-04-11 DIAGNOSIS — E78.00 HYPERCHOLESTEREMIA: ICD-10-CM

## 2024-04-11 DIAGNOSIS — R11.2 NAUSEA AND VOMITING, UNSPECIFIED VOMITING TYPE: ICD-10-CM

## 2024-04-11 DIAGNOSIS — H81.01 MENIERE'S DISEASE OF RIGHT EAR: Primary | ICD-10-CM

## 2024-04-11 DIAGNOSIS — R68.82 DECREASED SEX DRIVE: ICD-10-CM

## 2024-04-11 DIAGNOSIS — R53.83 OTHER FATIGUE: ICD-10-CM

## 2024-04-11 LAB
25(OH)D3 SERPL-MCNC: 19.8 NG/ML (ref 30–100)
ALBUMIN SERPL-MCNC: 4.4 G/DL (ref 3.5–5.2)
ALP SERPL-CCNC: 122 U/L (ref 40–129)
ALT SERPL-CCNC: 31 U/L (ref 0–40)
ANION GAP SERPL CALCULATED.3IONS-SCNC: 11 MMOL/L (ref 7–16)
AST SERPL-CCNC: 29 U/L (ref 0–39)
BASOPHILS # BLD: 0.04 K/UL (ref 0–0.2)
BASOPHILS NFR BLD: 1 % (ref 0–2)
BILIRUB SERPL-MCNC: 0.6 MG/DL (ref 0–1.2)
BUN SERPL-MCNC: 15 MG/DL (ref 6–20)
CALCIUM SERPL-MCNC: 10 MG/DL (ref 8.6–10.2)
CHLORIDE SERPL-SCNC: 103 MMOL/L (ref 98–107)
CHOLEST SERPL-MCNC: 301 MG/DL
CO2 SERPL-SCNC: 27 MMOL/L (ref 22–29)
CREAT SERPL-MCNC: 1.1 MG/DL (ref 0.7–1.2)
EOSINOPHIL # BLD: 0.12 K/UL (ref 0.05–0.5)
EOSINOPHILS RELATIVE PERCENT: 3 % (ref 0–6)
ERYTHROCYTE [DISTWIDTH] IN BLOOD BY AUTOMATED COUNT: 13.2 % (ref 11.5–15)
GFR SERPL CREATININE-BSD FRML MDRD: 89 ML/MIN/1.73M2
GLUCOSE SERPL-MCNC: 106 MG/DL (ref 74–99)
HBA1C MFR BLD: 5.2 % (ref 4–5.6)
HCT VFR BLD AUTO: 47 % (ref 37–54)
HDLC SERPL-MCNC: 50 MG/DL
HGB BLD-MCNC: 15.9 G/DL (ref 12.5–16.5)
IMM GRANULOCYTES # BLD AUTO: <0.03 K/UL (ref 0–0.58)
IMM GRANULOCYTES NFR BLD: 0 % (ref 0–5)
LDLC SERPL CALC-MCNC: 232 MG/DL
LYMPHOCYTES NFR BLD: 1.36 K/UL (ref 1.5–4)
LYMPHOCYTES RELATIVE PERCENT: 29 % (ref 20–42)
MCH RBC QN AUTO: 30.8 PG (ref 26–35)
MCHC RBC AUTO-ENTMCNC: 33.8 G/DL (ref 32–34.5)
MCV RBC AUTO: 91.1 FL (ref 80–99.9)
MONOCYTES NFR BLD: 0.59 K/UL (ref 0.1–0.95)
MONOCYTES NFR BLD: 12 % (ref 2–12)
NEUTROPHILS NFR BLD: 56 % (ref 43–80)
NEUTS SEG NFR BLD: 2.63 K/UL (ref 1.8–7.3)
PLATELET # BLD AUTO: 255 K/UL (ref 130–450)
PMV BLD AUTO: 9 FL (ref 7–12)
POTASSIUM SERPL-SCNC: 4.1 MMOL/L (ref 3.5–5)
PROT SERPL-MCNC: 7.4 G/DL (ref 6.4–8.3)
RBC # BLD AUTO: 5.16 M/UL (ref 3.8–5.8)
SODIUM SERPL-SCNC: 141 MMOL/L (ref 132–146)
TRIGL SERPL-MCNC: 95 MG/DL
TSH SERPL DL<=0.05 MIU/L-ACNC: 2.12 UIU/ML (ref 0.27–4.2)
VLDLC SERPL CALC-MCNC: 19 MG/DL
WBC OTHER # BLD: 4.8 K/UL (ref 4.5–11.5)

## 2024-04-11 PROCEDURE — 80053 COMPREHEN METABOLIC PANEL: CPT

## 2024-04-11 PROCEDURE — 83036 HEMOGLOBIN GLYCOSYLATED A1C: CPT

## 2024-04-11 PROCEDURE — 84403 ASSAY OF TOTAL TESTOSTERONE: CPT

## 2024-04-11 PROCEDURE — 84270 ASSAY OF SEX HORMONE GLOBUL: CPT

## 2024-04-11 PROCEDURE — 85025 COMPLETE CBC W/AUTO DIFF WBC: CPT

## 2024-04-11 PROCEDURE — 82306 VITAMIN D 25 HYDROXY: CPT

## 2024-04-11 PROCEDURE — 99214 OFFICE O/P EST MOD 30 MIN: CPT | Performed by: NURSE PRACTITIONER

## 2024-04-11 PROCEDURE — 80061 LIPID PANEL: CPT

## 2024-04-11 PROCEDURE — 36415 COLL VENOUS BLD VENIPUNCTURE: CPT

## 2024-04-11 PROCEDURE — 84443 ASSAY THYROID STIM HORMONE: CPT

## 2024-04-11 RX ORDER — SCOLOPAMINE TRANSDERMAL SYSTEM 1 MG/1
1 PATCH, EXTENDED RELEASE TRANSDERMAL
Qty: 10 PATCH | Refills: 2 | Status: SHIPPED | OUTPATIENT
Start: 2024-04-11

## 2024-04-11 RX ORDER — PREDNISONE 5 MG/1
TABLET ORAL
Qty: 6 TABLET | Refills: 0 | Status: SHIPPED | OUTPATIENT
Start: 2024-04-11

## 2024-04-11 ASSESSMENT — PATIENT HEALTH QUESTIONNAIRE - PHQ9
SUM OF ALL RESPONSES TO PHQ QUESTIONS 1-9: 0
SUM OF ALL RESPONSES TO PHQ QUESTIONS 1-9: 8
1. LITTLE INTEREST OR PLEASURE IN DOING THINGS: NOT AT ALL
SUM OF ALL RESPONSES TO PHQ QUESTIONS 1-9: 0
4. FEELING TIRED OR HAVING LITTLE ENERGY: NEARLY EVERY DAY
6. FEELING BAD ABOUT YOURSELF - OR THAT YOU ARE A FAILURE OR HAVE LET YOURSELF OR YOUR FAMILY DOWN: SEVERAL DAYS
1. LITTLE INTEREST OR PLEASURE IN DOING THINGS: SEVERAL DAYS
SUM OF ALL RESPONSES TO PHQ QUESTIONS 1-9: 8
5. POOR APPETITE OR OVEREATING: SEVERAL DAYS
10. IF YOU CHECKED OFF ANY PROBLEMS, HOW DIFFICULT HAVE THESE PROBLEMS MADE IT FOR YOU TO DO YOUR WORK, TAKE CARE OF THINGS AT HOME, OR GET ALONG WITH OTHER PEOPLE: VERY DIFFICULT
3. TROUBLE FALLING OR STAYING ASLEEP: NOT AT ALL
9. THOUGHTS THAT YOU WOULD BE BETTER OFF DEAD, OR OF HURTING YOURSELF: NOT AT ALL
SUM OF ALL RESPONSES TO PHQ9 QUESTIONS 1 & 2: 0
SUM OF ALL RESPONSES TO PHQ9 QUESTIONS 1 & 2: 2
SUM OF ALL RESPONSES TO PHQ QUESTIONS 1-9: 8
2. FEELING DOWN, DEPRESSED OR HOPELESS: NOT AT ALL
SUM OF ALL RESPONSES TO PHQ QUESTIONS 1-9: 0
7. TROUBLE CONCENTRATING ON THINGS, SUCH AS READING THE NEWSPAPER OR WATCHING TELEVISION: SEVERAL DAYS
SUM OF ALL RESPONSES TO PHQ QUESTIONS 1-9: 8
2. FEELING DOWN, DEPRESSED OR HOPELESS: SEVERAL DAYS
SUM OF ALL RESPONSES TO PHQ QUESTIONS 1-9: 0
8. MOVING OR SPEAKING SO SLOWLY THAT OTHER PEOPLE COULD HAVE NOTICED. OR THE OPPOSITE, BEING SO FIGETY OR RESTLESS THAT YOU HAVE BEEN MOVING AROUND A LOT MORE THAN USUAL: NOT AT ALL

## 2024-04-11 ASSESSMENT — ENCOUNTER SYMPTOMS
SHORTNESS OF BREATH: 0
VOMITING: 1
COUGH: 0
WHEEZING: 0
CONSTIPATION: 0
DIARRHEA: 1
NAUSEA: 1

## 2024-04-11 NOTE — PROGRESS NOTES
Anibal Albarran (:  1975) is a 48 y.o. male,Established patient, here for evaluation of the following chief complaint(s):  menieres (Having a flare-up)         ASSESSMENT/PLAN:  1. Meniere's disease of right ear  2. Vestibular migraine  -     predniSONE (DELTASONE) 5 MG tablet; Taper 6,5,4,3,2,1; take with food, Disp-6 tablet, R-0Normal  -     scopolamine (TRANSDERM-SCOP) transdermal patch; Place 1 patch onto the skin every 72 hours, Disp-10 patch, R-2Normal  - Reviewed side effects of medication and patient verbalizes understanding.   - Advised to call back directly if there are further questions, or if these symptoms fail to improve as anticipated or worsen.  - Declined valium at this time, would like to see if prednisone helps    3. Diarrhea, unspecified type  -  scop patch may help resolve    4. Nausea and vomiting, unspecified vomiting type  -  continue zofran, promethazine makes him too tired    5. Severe motion sickness, subsequent encounter  6. Vertigo   -     scopolamine (TRANSDERM-SCOP) transdermal patch; Place 1 patch onto the skin every 72 hours, Disp-10 patch, R-2Normal  - Meclizine makes him too tired    -  will cover for yesterday and today for work, patient would like to go back tomorrow  -  may need ADA paperwork, not at job long enough for FMLA      Return if symptoms worsen or fail to improve.         Subjective   SUBJECTIVE/OBJECTIVE:  HPI  Here today for sx of meniere's that started 2 days ago. He started with vomiting the next day. He is still vomiting, has a severe headache and right ear pain and vertigo. Had a flare on 3/13, started on prednisone and sx resolved.     /86   Pulse 77   Temp 98.8 °F (37.1 °C)   Resp 16   Ht 1.829 m (6' 0.01\")   Wt 94.2 kg (207 lb 9.6 oz)   SpO2 98%   BMI 28.15 kg/m²     Review of Systems   Constitutional:  Positive for activity change (decreased) and appetite change (decreased). Negative for chills, diaphoresis, fever and unexpected weight

## 2024-04-12 LAB
SHBG SERPL-SCNC: 34 NMOL/L (ref 17–56)
TESTOST FREE MFR SERPL: 61.6 PG/ML (ref 47–244)
TESTOST SERPL-MCNC: 317 NG/DL (ref 249–836)
TESTOSTERONE, BIOAVAILABLE: 144.4 NG/DL (ref 130–680)

## 2024-04-15 DIAGNOSIS — E55.9 VITAMIN D DEFICIENCY: Primary | ICD-10-CM

## 2024-04-15 RX ORDER — ERGOCALCIFEROL 1.25 MG/1
50000 CAPSULE ORAL WEEKLY
Qty: 12 CAPSULE | Refills: 0 | Status: SHIPPED | OUTPATIENT
Start: 2024-04-15

## 2024-07-18 NOTE — ED NOTES
Pt states that he is ready to go home, requested to know where is belongings were, advised him that they are secured outside of the room. Pt states I might be needing those soon. I advised pt that he will no be held here against his will however law enforcement will be notified if he leaves due suicidal ideations. When giving patient his cup of medication (IBP and Valium) pt removed the valium from cup and immediately started chewing it up, advised pt that he is supposed to swallow it, not chew it up, pt states \"works better if you chew it up\"   After chewing valium up pt swallows IBP. Dr. Nic Cui made aware.         Shea Mulligan, VINAYAK  05/11/22 7401 Refill epi pen

## 2024-08-12 ENCOUNTER — APPOINTMENT (OUTPATIENT)
Dept: GENERAL RADIOLOGY | Facility: HOSPITAL | Age: 49
End: 2024-08-12
Payer: MEDICARE

## 2024-08-12 ENCOUNTER — HOSPITAL ENCOUNTER (EMERGENCY)
Facility: HOSPITAL | Age: 49
Discharge: HOME | End: 2024-08-12
Attending: STUDENT IN AN ORGANIZED HEALTH CARE EDUCATION/TRAINING PROGRAM | Admitting: STUDENT IN AN ORGANIZED HEALTH CARE EDUCATION/TRAINING PROGRAM
Payer: MEDICARE

## 2024-08-12 VITALS
BODY MASS INDEX: 20.04 KG/M2 | WEIGHT: 140 LBS | HEIGHT: 70 IN | OXYGEN SATURATION: 100 % | DIASTOLIC BLOOD PRESSURE: 96 MMHG | TEMPERATURE: 98 F | RESPIRATION RATE: 18 BRPM | HEART RATE: 82 BPM | SYSTOLIC BLOOD PRESSURE: 131 MMHG

## 2024-08-12 DIAGNOSIS — M25.512 LEFT SHOULDER PAIN, UNSPECIFIED CHRONICITY: ICD-10-CM

## 2024-08-12 DIAGNOSIS — S23.41XA RIB SPRAIN, INITIAL ENCOUNTER: Primary | ICD-10-CM

## 2024-08-12 PROCEDURE — 99283 EMERGENCY DEPT VISIT LOW MDM: CPT

## 2024-08-12 PROCEDURE — 73030 X-RAY EXAM OF SHOULDER: CPT

## 2024-08-12 PROCEDURE — 71101 X-RAY EXAM UNILAT RIBS/CHEST: CPT

## 2024-08-12 RX ORDER — NAPROXEN 500 MG/1
500 TABLET ORAL ONCE
Status: COMPLETED | OUTPATIENT
Start: 2024-08-12 | End: 2024-08-12

## 2024-08-12 RX ORDER — CYCLOBENZAPRINE HCL 5 MG
5 TABLET ORAL 3 TIMES DAILY PRN
Qty: 12 TABLET | Refills: 0 | Status: SHIPPED | OUTPATIENT
Start: 2024-08-12

## 2024-08-12 RX ORDER — CYCLOBENZAPRINE HCL 10 MG
10 TABLET ORAL ONCE
Status: COMPLETED | OUTPATIENT
Start: 2024-08-12 | End: 2024-08-12

## 2024-08-12 RX ORDER — NAPROXEN 500 MG/1
500 TABLET ORAL EVERY 12 HOURS PRN
Qty: 30 TABLET | Refills: 0 | Status: SHIPPED | OUTPATIENT
Start: 2024-08-12

## 2024-08-12 RX ADMIN — NAPROXEN 500 MG: 500 TABLET ORAL at 11:12

## 2024-08-12 RX ADMIN — CYCLOBENZAPRINE 10 MG: 10 TABLET, FILM COATED ORAL at 11:12

## 2024-08-12 NOTE — DISCHARGE INSTRUCTIONS
Take medications as prescribed as needed.  Alternate ice and heat to areas of pain.  Follow-up with your PCP for further outpatient evaluation if symptoms persist.  Return to the ER for new or worsening symptoms or acute concerns.

## 2024-08-12 NOTE — ED PROVIDER NOTES
Subjective:  Chief Complaint:  Rib pain    History of Present Illness:  Patient is a 48-year-old male with history of alcohol abuse, anxiety, arthritis, depression, fibromyalgia, GERD, among others presenting to the ER with complaints of left rib and shoulder pain after he adopted a dog and he was walking it on a leash and it went after his mom's.  States that it jerked him hard and he is having severe pain that feels similar to when he broke a rib.  States it hurts to take deep breaths.  States that he has injured his shoulder before and it always hurts but feels maybe a little worse than usual.  Also reports chronic back pain.  States he took Excedrin prior to arrival but no other over-the-counter pain medications.  Denies additional symptoms or complaints at this time.      Nurses Notes reviewed and agree, including vitals, allergies, social history and prior medical history.     REVIEW OF SYSTEMS: All systems reviewed and not pertinent unless noted.  Review of Systems   Musculoskeletal:         Left shoulder and rib pain   All other systems reviewed and are negative.      Past Medical History:   Diagnosis Date    Alcohol abuse     Anxiety     Arthritis     Back pain, chronic     Carpal tunnel syndrome     Depression     Diverticulosis     Fibromyalgia     Fractures     GERD (gastroesophageal reflux disease)     Hiatal hernia     History of blood transfusion     Hypertension     pt states no longer has it, was from anxiety attacks.     Migraine     Nausea and vomiting 11/21/2023    Seizure 12/28/2023    Shoulder injury     Substance abuse     Tendonitis     Tobacco abuse     Traumatic brain injury 1999       Allergies:    Shellfish-derived products, Sulfa antibiotics, Duloxetine hcl, Penicillins, and Venlafaxine      Past Surgical History:   Procedure Laterality Date    COLONOSCOPY  04/04/2019    Dr. Qureshi- 3 cecal polyps, ascending colon polyp, sigmoid polyp, proctitis, mild diverticulosis (path- 2 tubular  "adenoma, 1 sessile serrated adenoma, 1 hyperplastic, rectal bx with features of prolapse)    EYE SURGERY Left 02/1999    reconstruction    EYE SURGERY      reconstruction of left eye     SPINAL CORD STIMULATOR IMPLANT N/A 05/16/2017    Procedure: INTRATHECAL CATHETER PLACEMENT ;  Surgeon: Aristides Zafar MD;  [facility];  Service:     TRACHEOSTOMY      WISDOM TOOTH EXTRACTION           Social History     Socioeconomic History    Marital status:    Tobacco Use    Smoking status: Every Day     Current packs/day: 1.00     Average packs/day: 1 pack/day for 20.0 years (20.0 ttl pk-yrs)     Types: Electronic Cigarette, Cigarettes    Smokeless tobacco: Former    Tobacco comments:     quit in 2021   Vaping Use    Vaping status: Every Day    Substances: Nicotine    Devices: Refillable tank   Substance and Sexual Activity    Alcohol use: Yes     Alcohol/week: 126.0 standard drinks of alcohol     Types: 126 Cans of beer per week     Comment: 16 oz cans    Drug use: Not Currently     Types: Marijuana, Methamphetamines     Comment: alcohol and smokes matijuana    Sexual activity: Defer         Family History   Problem Relation Age of Onset    Arthritis Mother     Other Mother         Malignant neoplasm    Arthritis Other     Other Other         Malignant neoplasm    Hypertension Other     Alcohol abuse Father     Arthritis Other     Cancer Other        Objective  Physical Exam:  /96 (BP Location: Left arm, Patient Position: Sitting)   Pulse 82   Temp 98 °F (36.7 °C) (Oral)   Resp 20   Ht 177.8 cm (70\")   Wt 63.5 kg (140 lb)   SpO2 100%   BMI 20.09 kg/m²      Physical Exam  Vitals and nursing note reviewed.   Constitutional:       General: He is not in acute distress.     Appearance: He is not toxic-appearing.   HENT:      Head: Normocephalic and atraumatic.      Right Ear: External ear normal.      Left Ear: External ear normal.      Nose: Nose normal.   Eyes:      Extraocular Movements: " Extraocular movements intact.      Conjunctiva/sclera: Conjunctivae normal.   Cardiovascular:      Rate and Rhythm: Normal rate.      Heart sounds: Normal heart sounds.   Pulmonary:      Effort: Pulmonary effort is normal. No respiratory distress.      Breath sounds: Normal breath sounds.   Abdominal:      General: There is no distension.   Musculoskeletal:         General: Normal range of motion.      Cervical back: Normal range of motion and neck supple. No tenderness.      Comments: Tenderness to left chest wall/ribs, no deformities noted    Tenderness to shoulder but range of motion is intact and no obvious deformity, neurovascularly intact   Skin:     General: Skin is warm and dry.   Neurological:      General: No focal deficit present.      Mental Status: He is alert and oriented to person, place, and time.   Psychiatric:         Mood and Affect: Mood normal.         Behavior: Behavior normal.         Procedures    ED Course:         Lab Results (last 24 hours)       ** No results found for the last 24 hours. **             XR Ribs Left With PA Chest    Result Date: 8/12/2024  PROCEDURE: XR RIBS LEFT W PA CHEST-  HISTORY: pain after dog jerked him while walking on leash  FINDINGS:  4 views of the left ribs show no fractures. There is no pneumothorax or pleural fluid collection. Frontal chest radiograph is unremarkable.      Impression: Negative left rib series.   This report was signed and finalized on 8/12/2024 12:26 PM by Fercho Luke MD.      XR Shoulder 2+ View Left    Result Date: 8/12/2024  PROCEDURE: XR SHOULDER 2+ VW LEFT-  THREE VIEW  HISTORY: pain after dog jerked him  FINDINGS:  Three views show no evidence of an acute, displaced fracture or dislocation of the visualized bony architecture.  The joint spaces appear normal.      Impression: Unremarkable exam.     This report was signed and finalized on 8/12/2024 12:13 PM by Fercho Luke MD.          MDM  Patient evaluated in the ER for left rib and  shoulder pain after he got jerked while walking his dog on a leash when it ran after a cat.  He is hemodynamically stable, afebrile, nontoxic-appearing on exam.  Differential diagnosis includes but is not limited to rib fracture, rib contusion, sprain of intercostal muscles, among others.  Initial plan includes left rib series, left shoulder x-ray, initial treatment with Flexeril and naproxen.    X-rays acutely unremarkable per radiology.  Patient agreeable with plan for discharge.  Prescription was sent for naproxen and Flexeril.  Patient agreeable with plan for discharge.  Recommended follow-up with PCP for further outpatient evaluation if symptoms persist.  Precautions were given for return to the ER for any new or worsening symptoms.    Final diagnoses:   Rib sprain, initial encounter   Left shoulder pain, unspecified chronicity          Salome Vila, MORGAN  08/12/24 9690

## 2024-09-11 ENCOUNTER — OFFICE VISIT (OUTPATIENT)
Dept: NEUROLOGY | Facility: CLINIC | Age: 49
End: 2024-09-11
Payer: MEDICARE

## 2024-09-11 VITALS
BODY MASS INDEX: 20.62 KG/M2 | HEIGHT: 70 IN | TEMPERATURE: 97.1 F | DIASTOLIC BLOOD PRESSURE: 74 MMHG | SYSTOLIC BLOOD PRESSURE: 130 MMHG | WEIGHT: 144 LBS

## 2024-09-11 DIAGNOSIS — G89.29 OTHER CHRONIC PAIN: ICD-10-CM

## 2024-09-11 DIAGNOSIS — G47.09 OTHER INSOMNIA: ICD-10-CM

## 2024-09-11 DIAGNOSIS — Z87.898 HISTORY OF SEIZURE: Primary | ICD-10-CM

## 2024-09-11 DIAGNOSIS — F10.90 CHRONIC ALCOHOL USE: ICD-10-CM

## 2024-09-11 NOTE — PROGRESS NOTES
"     Follow Up Office Visit      Patient Name: Douglas Tan  : 1975   MRN: [id number]     Chief Complaint:    Chief Complaint   Patient presents with    Follow-up     Patient in office to follow up on seizures. Patient stated he hasn't had any recent seizures, stated he stopped taking the Amitriptyline that it made him feel \"drugged\" the next day.       History of Present Illness: Douglas Tan is a 48 y.o. male who is here today to follow up and was last seen on 3/4/2024.  He says he tried the Amitriptyline but didn't like the way it made him feel and states, \"It made me drugged up the next day, it took me 3 hours to wake up\".  He is still not sleeping well.  He says he hasn't been to a \"Detox center\" yet but is thinking about it.  He states, \"I drink every day to help with the pain, I just hurt all the time\".  He is not interested in inpatient rehabilitation right now.  He states, \"I went to the [facility] a while ago but I didn't like that unit so I was gone within about 3 days\".  He has had no seizures since his previous visit, that he knows of.     Following taken from previous visit note:  Douglas Tan is a 48 y.o. male who is here today to follow up with seizures and was last seen on 10/9/2023.  He was prescribed Atogepant 60mg daily but says he only received it once from the pharmacy and is not sure it really helped with his migraines- states, \"Then I ran out and I don't pay a lot of attention to things\".  He has a daily headache, sometimes severe, and is taking Excedrin PRN.  He continues to drink alcohol daily and states, \"I have to get my house in order before I can go to rehab\".  Is not actively cutting back on his daily alcohol intake.  He thinks he will have everything together in a couple of weeks.  He has had periods of sobriety, in the past.  He doesn't think he has had any seizures since his previous visit but he isn't sure.  States, \"I just hurt so bad, I'm tired of hurting, I " "hurt all the time\".  He is not driving and doesn't have a license.   *MRI brain with and without contrast on 12/12/2023 showed- IMPRESSION: No acute intracranial process. Improved sinusitis and stable bilateral mastoid disease compared to prior.  *EEG on 12/12/2023 was normal.       Insomnia- He has new complaints of difficulty falling asleep and staying asleep.  Was taking Amitriptyline 25mg QHS for sleep but stopped taking that, since his previous visit. Does think that the Amitriptyline was helping.       Subjective      Review of Systems:   Review of Systems   Musculoskeletal:  Positive for arthralgias and back pain.   Neurological:  Negative for seizures.       I have reviewed and the following portions of the patient's history were updated as appropriate: past family history, past medical history, past social history, past surgical history and problem list.    Medications:     Current Outpatient Medications:     metoprolol tartrate (LOPRESSOR) 25 MG tablet, Take 0.5 tablets by mouth 2 (Two) Times a Day. Indications: High Blood Pressure Disorder, Disp: , Rfl:     tamsulosin (FLOMAX) 0.4 MG capsule 24 hr capsule, Take 1 capsule by mouth Every Night. Indications: Benign Enlargement of Prostate, Disp: , Rfl:     Allergies:   Allergies   Allergen Reactions    Shellfish-Derived Products Arrhythmia    Sulfa Antibiotics Swelling    Duloxetine Hcl Urinary Retention     \    Penicillins Rash    Venlafaxine Nausea And Vomiting       Objective     Physical Exam:  Vital Signs:   Vitals:    09/11/24 0828   BP: 130/74   BP Location: Right arm   Patient Position: Sitting   Cuff Size: Adult   Pulse: Comment: pulse ox would not read on patient.   Resp: Comment: pulse ox would not read on patient.   Temp: 97.1 °F (36.2 °C)   Weight: 65.3 kg (144 lb)   Height: 177.8 cm (70\")   PainSc:   7   PainLoc: Generalized     Body mass index is 20.66 kg/m².    Physical Exam  Vitals and nursing note reviewed.   Constitutional:       General: " He is not in acute distress.     Appearance: He is well-developed. He is not diaphoretic.   Eyes:      Extraocular Movements: Extraocular movements intact.      Conjunctiva/sclera: Conjunctivae normal.   Pulmonary:      Effort: Pulmonary effort is normal. No respiratory distress.   Musculoskeletal:         General: Normal range of motion.   Skin:     General: Skin is warm and dry.   Neurological:      Mental Status: He is alert and oriented to person, place, and time.   Psychiatric:         Mood and Affect: Mood normal.         Behavior: Behavior normal.         Thought Content: Thought content normal.         Judgment: Judgment normal.         Neurologic Exam     Mental Status   Oriented to person, place, and time.        Assessment / Plan      Assessment/Plan:   Diagnoses and all orders for this visit:    1. History of seizure (Primary)    2. Other insomnia    3. Chronic alcohol use    4. Other chronic pain    *I have strongly encouraged patient to go to the [facility] for intensive inpatient alcohol withdrawal and he says he is considering it. I have discussed the importance of him not stopping his alcohol intake suddenly and he verbalizes understanding.   *Seizure precautions discussed and encouraged. I have advised him of [place] driving laws regarding seizures and driving. Driving restrictions in place currently due to daily alcohol intake.      Follow Up:   Return if symptoms worsen or fail to improve.    NICOLE Machuca, FNP-C  [facility]

## 2024-09-18 ENCOUNTER — HOSPITAL ENCOUNTER (EMERGENCY)
Facility: HOSPITAL | Age: 49
Discharge: HOME | End: 2024-09-18
Attending: HOSPITALIST
Payer: MEDICAID

## 2024-09-18 VITALS
TEMPERATURE: 98.2 F | RESPIRATION RATE: 16 BRPM | HEART RATE: 76 BPM | WEIGHT: 145 LBS | DIASTOLIC BLOOD PRESSURE: 96 MMHG | BODY MASS INDEX: 20.76 KG/M2 | HEIGHT: 70 IN | OXYGEN SATURATION: 99 % | SYSTOLIC BLOOD PRESSURE: 134 MMHG

## 2024-09-18 DIAGNOSIS — E87.1 HYPONATREMIA: Primary | ICD-10-CM

## 2024-09-18 DIAGNOSIS — R53.1 GENERAL WEAKNESS: ICD-10-CM

## 2024-09-18 DIAGNOSIS — F10.10 ALCOHOL ABUSE: ICD-10-CM

## 2024-09-18 LAB
ALBUMIN SERPL-MCNC: 4.5 G/DL (ref 3.4–4.8)
ALBUMIN/GLOB SERPL: 1.8 {RATIO} (ref 0.8–2)
ALP SERPL-CCNC: 100 U/L (ref 25–100)
ALT SERPL-CCNC: 132 U/L (ref 4–36)
ANION GAP SERPL CALCULATED.3IONS-SCNC: 14 MMOL/L (ref 3–16)
AST SERPL-CCNC: 142 U/L (ref 8–33)
BASOPHILS # BLD: 0.1 K/UL (ref 0–0.1)
BASOPHILS NFR BLD: 1.1 %
BILIRUB SERPL-MCNC: 0.6 MG/DL (ref 0.3–1.2)
BUN SERPL-MCNC: 5 MG/DL (ref 6–20)
CALCIUM SERPL-MCNC: 9.5 MG/DL (ref 8.5–10.5)
CHLORIDE SERPL-SCNC: 95 MMOL/L (ref 98–107)
CO2 SERPL-SCNC: 23 MMOL/L (ref 20–30)
CREAT SERPL-MCNC: 0.7 MG/DL (ref 0.4–1.2)
EOSINOPHIL # BLD: 0.1 K/UL (ref 0–0.4)
EOSINOPHIL NFR BLD: 1.2 %
ERYTHROCYTE [DISTWIDTH] IN BLOOD BY AUTOMATED COUNT: 13 % (ref 11–16)
GFR SERPLBLD CREATININE-BSD FMLA CKD-EPI: >90 ML/MIN/{1.73_M2}
GLOBULIN SER CALC-MCNC: 2.5 G/DL
GLUCOSE SERPL-MCNC: 87 MG/DL (ref 74–106)
HCT VFR BLD AUTO: 39.3 % (ref 40–54)
HGB BLD-MCNC: 13.7 G/DL (ref 13–18)
IMM GRANULOCYTES # BLD: 0 K/UL
IMM GRANULOCYTES NFR BLD: 0.2 % (ref 0–5)
LYMPHOCYTES # BLD: 1.4 K/UL (ref 1.5–4)
LYMPHOCYTES NFR BLD: 21.8 %
MCH RBC QN AUTO: 34 PG (ref 27–32)
MCHC RBC AUTO-ENTMCNC: 34.9 G/DL (ref 31–35)
MCV RBC AUTO: 97.5 FL (ref 80–100)
MONOCYTES # BLD: 0.6 K/UL (ref 0.2–0.8)
MONOCYTES NFR BLD: 10 %
NEUTROPHILS # BLD: 4.2 K/UL (ref 2–7.5)
NEUTS SEG NFR BLD: 65.7 %
PLATELET # BLD AUTO: 217 K/UL (ref 150–400)
PMV BLD AUTO: 8.7 FL (ref 6–10)
POTASSIUM SERPL-SCNC: 3.7 MMOL/L (ref 3.4–5.1)
PROT SERPL-MCNC: 7 G/DL (ref 6.4–8.3)
RBC # BLD AUTO: 4.03 M/UL (ref 4.5–6)
SODIUM SERPL-SCNC: 132 MMOL/L (ref 136–145)
WBC # BLD AUTO: 6.4 K/UL (ref 4–11)

## 2024-09-18 PROCEDURE — 80053 COMPREHEN METABOLIC PANEL: CPT

## 2024-09-18 PROCEDURE — 6370000000 HC RX 637 (ALT 250 FOR IP): Performed by: HOSPITALIST

## 2024-09-18 PROCEDURE — 85025 COMPLETE CBC W/AUTO DIFF WBC: CPT

## 2024-09-18 PROCEDURE — 2580000003 HC RX 258: Performed by: HOSPITALIST

## 2024-09-18 PROCEDURE — 99284 EMERGENCY DEPT VISIT MOD MDM: CPT

## 2024-09-18 PROCEDURE — 36415 COLL VENOUS BLD VENIPUNCTURE: CPT

## 2024-09-18 PROCEDURE — 96360 HYDRATION IV INFUSION INIT: CPT

## 2024-09-18 PROCEDURE — 93005 ELECTROCARDIOGRAM TRACING: CPT

## 2024-09-18 RX ORDER — 0.9 % SODIUM CHLORIDE 0.9 %
1000 INTRAVENOUS SOLUTION INTRAVENOUS ONCE
Status: COMPLETED | OUTPATIENT
Start: 2024-09-18 | End: 2024-09-18

## 2024-09-18 RX ORDER — ACETAMINOPHEN 325 MG/1
650 TABLET ORAL ONCE
Status: COMPLETED | OUTPATIENT
Start: 2024-09-18 | End: 2024-09-18

## 2024-09-18 RX ADMIN — SODIUM CHLORIDE 1000 ML: 9 INJECTION, SOLUTION INTRAVENOUS at 16:32

## 2024-09-18 RX ADMIN — ACETAMINOPHEN 650 MG: 325 TABLET, FILM COATED ORAL at 16:51

## 2024-09-18 ASSESSMENT — PAIN SCALES - GENERAL
PAINLEVEL_OUTOF10: 0
PAINLEVEL_OUTOF10: 7
PAINLEVEL_OUTOF10: 8

## 2024-09-18 ASSESSMENT — PAIN DESCRIPTION - DESCRIPTORS: DESCRIPTORS: ACHING

## 2024-09-18 ASSESSMENT — LIFESTYLE VARIABLES
HOW OFTEN DO YOU HAVE A DRINK CONTAINING ALCOHOL: NEVER
HOW MANY STANDARD DRINKS CONTAINING ALCOHOL DO YOU HAVE ON A TYPICAL DAY: 10 OR MORE
HOW OFTEN DO YOU HAVE A DRINK CONTAINING ALCOHOL: 4 OR MORE TIMES A WEEK

## 2024-09-18 ASSESSMENT — PAIN DESCRIPTION - LOCATION
LOCATION: BACK;HEAD
LOCATION: HEAD

## 2024-09-18 ASSESSMENT — PAIN - FUNCTIONAL ASSESSMENT: PAIN_FUNCTIONAL_ASSESSMENT: 0-10

## 2024-09-23 ENCOUNTER — FOLLOWUP TELEPHONE ENCOUNTER (OUTPATIENT)
Dept: EMERGENCY DEPT | Facility: HOSPITAL | Age: 49
End: 2024-09-23

## 2024-09-24 ENCOUNTER — FOLLOWUP TELEPHONE ENCOUNTER (OUTPATIENT)
Dept: EMERGENCY DEPT | Facility: HOSPITAL | Age: 49
End: 2024-09-24

## 2024-09-25 ENCOUNTER — FOLLOWUP TELEPHONE ENCOUNTER (OUTPATIENT)
Dept: EMERGENCY DEPT | Facility: HOSPITAL | Age: 49
End: 2024-09-25

## 2024-09-30 ENCOUNTER — FOLLOWUP TELEPHONE ENCOUNTER (OUTPATIENT)
Dept: EMERGENCY DEPT | Facility: HOSPITAL | Age: 49
End: 2024-09-30

## 2024-09-30 NOTE — TELEPHONE ENCOUNTER
Pt contacted [facility] via text message, stating that he left [facility] early and had his mother bring him back home. Pt said he will contact [facility] about finding inpatient or outpatient treatment.     Mauricio ERVIN

## 2024-11-18 ENCOUNTER — OFFICE VISIT (OUTPATIENT)
Dept: PRIMARY CARE CLINIC | Age: 49
End: 2024-11-18

## 2024-11-18 VITALS
DIASTOLIC BLOOD PRESSURE: 74 MMHG | RESPIRATION RATE: 20 BRPM | TEMPERATURE: 97.8 F | WEIGHT: 206 LBS | BODY MASS INDEX: 27.9 KG/M2 | SYSTOLIC BLOOD PRESSURE: 114 MMHG | HEIGHT: 72 IN | HEART RATE: 64 BPM | OXYGEN SATURATION: 96 %

## 2024-11-18 DIAGNOSIS — J22 ACUTE LOWER RESPIRATORY TRACT INFECTION: Primary | ICD-10-CM

## 2024-11-18 DIAGNOSIS — R05.9 COUGH IN ADULT PATIENT: ICD-10-CM

## 2024-11-18 LAB
INFLUENZA A ANTIGEN, POC: NEGATIVE
INFLUENZA B ANTIGEN, POC: NEGATIVE
Lab: NORMAL
PERFORMING INSTRUMENT: NORMAL
QC PASS/FAIL: NORMAL
SARS-COV-2, POC: NORMAL

## 2024-11-18 RX ORDER — DEXTROMETHORPHAN HYDROBROMIDE AND PROMETHAZINE HYDROCHLORIDE 15; 6.25 MG/5ML; MG/5ML
5 SYRUP ORAL 4 TIMES DAILY PRN
Qty: 180 ML | Refills: 0 | Status: SHIPPED | OUTPATIENT
Start: 2024-11-18 | End: 2024-11-25

## 2024-11-18 RX ORDER — DOXYCYCLINE HYCLATE 100 MG
100 TABLET ORAL 2 TIMES DAILY
Qty: 20 TABLET | Refills: 0 | Status: SHIPPED | OUTPATIENT
Start: 2024-11-18 | End: 2024-11-28

## 2024-11-18 RX ORDER — PREDNISONE 10 MG/1
10 TABLET ORAL 2 TIMES DAILY
Qty: 10 TABLET | Refills: 0 | Status: SHIPPED | OUTPATIENT
Start: 2024-11-18 | End: 2024-11-23

## 2024-11-18 NOTE — PROGRESS NOTES
Chief Complaint:   Chest Congestion (X 1 day/), Chills, Generalized Body Aches, and Cough      History of Present Illness   Source of history provided by:  patient.      Anibal Albarran is a 48 y.o. old male with a past medical history of:   Past Medical History:   Diagnosis Date    Anxiety 5/8/2020    H/O Meniere's disease       Pt [facility] to [facility] with a cough/chest congestion/fatigue/chills/body aches for the past 1-2 days.  States the cough is non productive.  No fever noted.   Denies any N/V/D, abdominal pain, CP, progressive SOB, dizziness, or lethargy.         ROS    Unless otherwise stated in this report or unable to obtain because of the patient's clinical or mental status as evidenced by the medical record, this patients's positive and negative responses for Review of Systems, constitutional, psych, eyes, ENT, cardiovascular, respiratory, gastrointestinal, neurological, genitourinary, musculoskeletal, integument systems and systems related to the presenting problem are either stated in the preceding or were not pertinent or were negative for the symptoms and/or complaints related to the medical problem.    Past Surgical History:  has a past surgical history that includes hernia repair and knee surgery (Left).  Social History:  reports that he quit smoking about 12 years ago. His smoking use included cigarettes. He has never used smokeless tobacco. He reports that he does not drink alcohol and does not use drugs.  Family History: family history is not on file.   Allergies: Erythromycin    Physical Exam         VS:  /74   Pulse 64   Temp 97.8 °F (36.6 °C) (Temporal)   Resp 20   Ht 1.829 m (6' 0.01\")   Wt 93.4 kg (206 lb)   SpO2 96%   BMI 27.93 kg/m²    Oxygen Saturation Interpretation: Normal.    Constitutional:  Alert, development consistent with age.  Ears:  External Ears: Normal bilateral pinna.             TM's & External Canals: TM's normal bilaterally without perforation.

## 2024-11-22 ENCOUNTER — OFFICE VISIT (OUTPATIENT)
Dept: FAMILY MEDICINE CLINIC | Age: 49
End: 2024-11-22

## 2024-11-22 VITALS
HEIGHT: 72 IN | WEIGHT: 204 LBS | TEMPERATURE: 97.8 F | OXYGEN SATURATION: 98 % | BODY MASS INDEX: 27.63 KG/M2 | SYSTOLIC BLOOD PRESSURE: 132 MMHG | DIASTOLIC BLOOD PRESSURE: 78 MMHG | HEART RATE: 84 BPM | RESPIRATION RATE: 16 BRPM

## 2024-11-22 DIAGNOSIS — H81.01 MENIERE'S DISEASE OF RIGHT EAR: Primary | ICD-10-CM

## 2024-11-22 DIAGNOSIS — J06.9 VIRAL URI: ICD-10-CM

## 2024-11-22 DIAGNOSIS — F33.2 SEVERE EPISODE OF RECURRENT MAJOR DEPRESSIVE DISORDER, WITHOUT PSYCHOTIC FEATURES (HCC): ICD-10-CM

## 2024-11-22 ASSESSMENT — PATIENT HEALTH QUESTIONNAIRE - PHQ9
SUM OF ALL RESPONSES TO PHQ QUESTIONS 1-9: 4
9. THOUGHTS THAT YOU WOULD BE BETTER OFF DEAD, OR OF HURTING YOURSELF: NOT AT ALL
5. POOR APPETITE OR OVEREATING: NOT AT ALL
1. LITTLE INTEREST OR PLEASURE IN DOING THINGS: SEVERAL DAYS
10. IF YOU CHECKED OFF ANY PROBLEMS, HOW DIFFICULT HAVE THESE PROBLEMS MADE IT FOR YOU TO DO YOUR WORK, TAKE CARE OF THINGS AT HOME, OR GET ALONG WITH OTHER PEOPLE: NOT DIFFICULT AT ALL
SUM OF ALL RESPONSES TO PHQ QUESTIONS 1-9: 4
SUM OF ALL RESPONSES TO PHQ QUESTIONS 1-9: 4
4. FEELING TIRED OR HAVING LITTLE ENERGY: NEARLY EVERY DAY
7. TROUBLE CONCENTRATING ON THINGS, SUCH AS READING THE NEWSPAPER OR WATCHING TELEVISION: NOT AT ALL
2. FEELING DOWN, DEPRESSED OR HOPELESS: NOT AT ALL
3. TROUBLE FALLING OR STAYING ASLEEP: NOT AT ALL
8. MOVING OR SPEAKING SO SLOWLY THAT OTHER PEOPLE COULD HAVE NOTICED. OR THE OPPOSITE, BEING SO FIGETY OR RESTLESS THAT YOU HAVE BEEN MOVING AROUND A LOT MORE THAN USUAL: NOT AT ALL
SUM OF ALL RESPONSES TO PHQ9 QUESTIONS 1 & 2: 1
6. FEELING BAD ABOUT YOURSELF - OR THAT YOU ARE A FAILURE OR HAVE LET YOURSELF OR YOUR FAMILY DOWN: NOT AT ALL
SUM OF ALL RESPONSES TO PHQ QUESTIONS 1-9: 4

## 2024-11-22 ASSESSMENT — ENCOUNTER SYMPTOMS
WHEEZING: 1
NAUSEA: 0
SHORTNESS OF BREATH: 1
DIARRHEA: 0
BACK PAIN: 0
COUGH: 1
SINUS PRESSURE: 1
VOMITING: 0
CONSTIPATION: 0
CHEST TIGHTNESS: 1
FACIAL SWELLING: 0

## 2024-11-22 NOTE — PROGRESS NOTES
Anibal Albarran (:  1975) is a 48 y.o. male,Established patient, here for evaluation of the following chief complaint(s):  FMLA (Wants FMLA paper work filled out )      Assessment & Plan   ASSESSMENT/PLAN:  1. Meniere's disease of right ear  2. Severe episode of recurrent major depressive disorder, without psychotic features (HCC)  3. Viral URI  -Finish steroids  -Patient is improving  -May have a letter covering for work  -Patient will be sending FMLA paperwork for us to set fill out  -Depression is stable, denies SI  -Continue to follow with psych  -Continue with current psychiatric medications for depression management      Return if symptoms worsen or fail to improve.         Subjective   SUBJECTIVE/OBJECTIVE:  HPI  Here today for Ménière's flare that started last Monday.  Missed 3-1/2 days of work last week due to the flare.  Patient got sick with an upper respiratory infection this past Monday.  This also exacerbated his flare.  He was started on doxycycline and prednisone and is starting to feel better.  He will be using his FMLA so will need released back to work.  Hoping to go back Monday.  /78   Pulse 84   Temp 97.8 °F (36.6 °C)   Resp 16   Ht 1.829 m (6' 0.01\")   Wt 92.5 kg (204 lb)   SpO2 98%   BMI 27.66 kg/m²     Review of Systems   Constitutional:  Positive for appetite change and fatigue. Negative for activity change, chills, diaphoresis, fever and unexpected weight change.   HENT:  Positive for congestion (In the chest and a little bit in the sinuses), ear pain (right), hearing loss (right) and sinus pressure. Negative for facial swelling.    Respiratory:  Positive for cough, chest tightness, shortness of breath and wheezing.         Symptoms controlled by steroid and albuterol inhaler   Cardiovascular:  Negative for chest pain and palpitations.   Gastrointestinal:  Negative for constipation, diarrhea, nausea and vomiting.   Genitourinary:  Negative for difficulty

## 2024-12-12 ENCOUNTER — OFFICE VISIT (OUTPATIENT)
Dept: FAMILY MEDICINE CLINIC | Age: 49
End: 2024-12-12

## 2024-12-12 VITALS
HEIGHT: 72 IN | RESPIRATION RATE: 18 BRPM | WEIGHT: 204.8 LBS | TEMPERATURE: 97.7 F | SYSTOLIC BLOOD PRESSURE: 128 MMHG | DIASTOLIC BLOOD PRESSURE: 80 MMHG | BODY MASS INDEX: 27.74 KG/M2 | HEART RATE: 74 BPM | OXYGEN SATURATION: 99 %

## 2024-12-12 DIAGNOSIS — H81.01 MENIERE'S DISEASE OF RIGHT EAR: Primary | ICD-10-CM

## 2024-12-12 RX ORDER — PREDNISONE 10 MG/1
10 TABLET ORAL 2 TIMES DAILY
Qty: 10 TABLET | Refills: 0 | Status: SHIPPED | OUTPATIENT
Start: 2024-12-12 | End: 2024-12-17

## 2024-12-12 ASSESSMENT — PATIENT HEALTH QUESTIONNAIRE - PHQ9
SUM OF ALL RESPONSES TO PHQ QUESTIONS 1-9: 0
SUM OF ALL RESPONSES TO PHQ QUESTIONS 1-9: 0
5. POOR APPETITE OR OVEREATING: NOT AT ALL
8. MOVING OR SPEAKING SO SLOWLY THAT OTHER PEOPLE COULD HAVE NOTICED. OR THE OPPOSITE, BEING SO FIGETY OR RESTLESS THAT YOU HAVE BEEN MOVING AROUND A LOT MORE THAN USUAL: NOT AT ALL
10. IF YOU CHECKED OFF ANY PROBLEMS, HOW DIFFICULT HAVE THESE PROBLEMS MADE IT FOR YOU TO DO YOUR WORK, TAKE CARE OF THINGS AT HOME, OR GET ALONG WITH OTHER PEOPLE: NOT DIFFICULT AT ALL
SUM OF ALL RESPONSES TO PHQ QUESTIONS 1-9: 0
SUM OF ALL RESPONSES TO PHQ QUESTIONS 1-9: 0
SUM OF ALL RESPONSES TO PHQ9 QUESTIONS 1 & 2: 0
3. TROUBLE FALLING OR STAYING ASLEEP: NOT AT ALL
6. FEELING BAD ABOUT YOURSELF - OR THAT YOU ARE A FAILURE OR HAVE LET YOURSELF OR YOUR FAMILY DOWN: NOT AT ALL
1. LITTLE INTEREST OR PLEASURE IN DOING THINGS: NOT AT ALL
4. FEELING TIRED OR HAVING LITTLE ENERGY: NOT AT ALL
7. TROUBLE CONCENTRATING ON THINGS, SUCH AS READING THE NEWSPAPER OR WATCHING TELEVISION: NOT AT ALL
9. THOUGHTS THAT YOU WOULD BE BETTER OFF DEAD, OR OF HURTING YOURSELF: NOT AT ALL
2. FEELING DOWN, DEPRESSED OR HOPELESS: NOT AT ALL

## 2024-12-12 ASSESSMENT — ENCOUNTER SYMPTOMS
SHORTNESS OF BREATH: 0
WHEEZING: 0
VOMITING: 0
CONSTIPATION: 0
BACK PAIN: 0
COUGH: 0
DIARRHEA: 0
NAUSEA: 0

## 2024-12-12 NOTE — PROGRESS NOTES
nervous/anxious.           Objective   Physical Exam  Constitutional:       Appearance: He is well-developed.   HENT:      Head: Normocephalic and atraumatic.      Right Ear: Tympanic membrane normal.      Left Ear: Tympanic membrane normal.   Neck:      Trachea: No tracheal deviation.   Cardiovascular:      Rate and Rhythm: Normal rate and regular rhythm.      Heart sounds: No murmur heard.  Pulmonary:      Effort: Pulmonary effort is normal. No respiratory distress.      Breath sounds: Normal breath sounds.   Abdominal:      General: Bowel sounds are normal.      Palpations: Abdomen is soft.      Tenderness: There is no abdominal tenderness.   Musculoskeletal:         General: Normal range of motion.   Skin:     General: Skin is warm and dry.   Neurological:      Mental Status: He is alert and oriented to person, place, and time.   Psychiatric:         Behavior: Behavior normal.            [facility]      An electronic signature was used to authenticate this note.    --ADAN Burgos - CNP

## 2025-01-07 ENCOUNTER — OFFICE VISIT (OUTPATIENT)
Dept: FAMILY MEDICINE CLINIC | Age: 50
End: 2025-01-07
Payer: COMMERCIAL

## 2025-01-07 VITALS
OXYGEN SATURATION: 98 % | HEART RATE: 97 BPM | BODY MASS INDEX: 28.05 KG/M2 | SYSTOLIC BLOOD PRESSURE: 128 MMHG | TEMPERATURE: 97.8 F | DIASTOLIC BLOOD PRESSURE: 84 MMHG | HEIGHT: 72 IN | RESPIRATION RATE: 16 BRPM | WEIGHT: 207.1 LBS

## 2025-01-07 DIAGNOSIS — H81.01 MENIERE'S DISEASE OF RIGHT EAR: ICD-10-CM

## 2025-01-07 PROCEDURE — 99213 OFFICE O/P EST LOW 20 MIN: CPT | Performed by: NURSE PRACTITIONER

## 2025-01-07 RX ORDER — PREDNISONE 10 MG/1
10 TABLET ORAL SEE ADMIN INSTRUCTIONS
Qty: 21 TABLET | Refills: 0 | Status: SHIPPED | OUTPATIENT
Start: 2025-01-07 | End: 2025-01-13

## 2025-01-07 RX ORDER — DIAZEPAM 2 MG/1
2 TABLET ORAL EVERY 6 HOURS PRN
Qty: 20 TABLET | Refills: 0 | Status: SHIPPED | OUTPATIENT
Start: 2025-01-07 | End: 2025-01-12

## 2025-01-07 ASSESSMENT — PATIENT HEALTH QUESTIONNAIRE - PHQ9
5. POOR APPETITE OR OVEREATING: NOT AT ALL
2. FEELING DOWN, DEPRESSED OR HOPELESS: NOT AT ALL
9. THOUGHTS THAT YOU WOULD BE BETTER OFF DEAD, OR OF HURTING YOURSELF: NOT AT ALL
6. FEELING BAD ABOUT YOURSELF - OR THAT YOU ARE A FAILURE OR HAVE LET YOURSELF OR YOUR FAMILY DOWN: NOT AT ALL
7. TROUBLE CONCENTRATING ON THINGS, SUCH AS READING THE NEWSPAPER OR WATCHING TELEVISION: NOT AT ALL
SUM OF ALL RESPONSES TO PHQ QUESTIONS 1-9: 0
8. MOVING OR SPEAKING SO SLOWLY THAT OTHER PEOPLE COULD HAVE NOTICED. OR THE OPPOSITE, BEING SO FIGETY OR RESTLESS THAT YOU HAVE BEEN MOVING AROUND A LOT MORE THAN USUAL: NOT AT ALL
SUM OF ALL RESPONSES TO PHQ QUESTIONS 1-9: 0
4. FEELING TIRED OR HAVING LITTLE ENERGY: NOT AT ALL
SUM OF ALL RESPONSES TO PHQ QUESTIONS 1-9: 0
1. LITTLE INTEREST OR PLEASURE IN DOING THINGS: NOT AT ALL
SUM OF ALL RESPONSES TO PHQ9 QUESTIONS 1 & 2: 0
3. TROUBLE FALLING OR STAYING ASLEEP: NOT AT ALL
SUM OF ALL RESPONSES TO PHQ QUESTIONS 1-9: 0
10. IF YOU CHECKED OFF ANY PROBLEMS, HOW DIFFICULT HAVE THESE PROBLEMS MADE IT FOR YOU TO DO YOUR WORK, TAKE CARE OF THINGS AT HOME, OR GET ALONG WITH OTHER PEOPLE: NOT DIFFICULT AT ALL

## 2025-01-07 ASSESSMENT — ENCOUNTER SYMPTOMS
VOMITING: 1
SHORTNESS OF BREATH: 0
DIARRHEA: 1
WHEEZING: 0
COUGH: 0
NAUSEA: 1
CONSTIPATION: 0

## 2025-01-07 NOTE — PROGRESS NOTES
allergic to ERYTHROMYCIN.    MEDICATIONS  Current: Albuterol inhaler, escitalopram 20 mg, buspirone 15 mg, Lamictal 150 mg, prednisone, Zofran, doxycycline.       /84   Pulse 97   Temp 97.8 °F (36.6 °C)   Resp 16   Ht 1.829 m (6' 0.01\")   Wt 93.9 kg (207 lb 1.6 oz)   SpO2 98%   BMI 28.08 kg/m²     Review of Systems   Constitutional:  Positive for activity change, appetite change, diaphoresis and fatigue. Negative for chills, fever and unexpected weight change.   Respiratory:  Negative for cough, shortness of breath and wheezing.    Cardiovascular:  Negative for chest pain and palpitations.   Gastrointestinal:  Positive for diarrhea, nausea and vomiting. Negative for constipation.   Genitourinary:  Negative for difficulty urinating.   Neurological:  Positive for dizziness, light-headedness and headaches. Negative for weakness.   Psychiatric/Behavioral:  Positive for dysphoric mood and sleep disturbance. Negative for self-injury and suicidal ideas. The patient is nervous/anxious.           Objective   Physical Exam  Vital Signs  Blood pressure is 128/84, pulse is 97, oxygen level is 98 percent.    Physical Exam  Constitutional:       Appearance: He is well-developed.   HENT:      Head: Normocephalic and atraumatic.   Neck:      Thyroid: No thyromegaly.      Trachea: No tracheal deviation.   Cardiovascular:      Rate and Rhythm: Normal rate and regular rhythm.      Heart sounds: No murmur heard.  Pulmonary:      Effort: Pulmonary effort is normal. No respiratory distress.      Breath sounds: Normal breath sounds.   Abdominal:      General: Bowel sounds are normal.      Palpations: Abdomen is soft.      Tenderness: There is no abdominal tenderness.   Musculoskeletal:         General: Normal range of motion.   Lymphadenopathy:      Cervical: No cervical adenopathy.   Skin:     General: Skin is warm and dry.   Neurological:      Mental Status: He is alert and oriented to person, place, and time.

## 2025-06-10 ENCOUNTER — OFFICE VISIT (OUTPATIENT)
Age: 50
End: 2025-06-10
Payer: MEDICAID

## 2025-06-10 VITALS
RESPIRATION RATE: 16 BRPM | TEMPERATURE: 97.8 F | HEART RATE: 105 BPM | BODY MASS INDEX: 18.75 KG/M2 | OXYGEN SATURATION: 97 % | HEIGHT: 70 IN | SYSTOLIC BLOOD PRESSURE: 127 MMHG | DIASTOLIC BLOOD PRESSURE: 84 MMHG | WEIGHT: 131 LBS

## 2025-06-10 DIAGNOSIS — J34.89 RHINORRHEA: Primary | ICD-10-CM

## 2025-06-10 DIAGNOSIS — R00.0 TACHYCARDIA: ICD-10-CM

## 2025-06-10 DIAGNOSIS — H04.203 WATERY EYES: ICD-10-CM

## 2025-06-10 PROCEDURE — 3079F DIAST BP 80-89 MM HG: CPT | Performed by: PHYSICIAN ASSISTANT

## 2025-06-10 PROCEDURE — G8427 DOCREV CUR MEDS BY ELIG CLIN: HCPCS | Performed by: PHYSICIAN ASSISTANT

## 2025-06-10 PROCEDURE — G8420 CALC BMI NORM PARAMETERS: HCPCS | Performed by: PHYSICIAN ASSISTANT

## 2025-06-10 PROCEDURE — 4004F PT TOBACCO SCREEN RCVD TLK: CPT | Performed by: PHYSICIAN ASSISTANT

## 2025-06-10 PROCEDURE — 99213 OFFICE O/P EST LOW 20 MIN: CPT | Performed by: PHYSICIAN ASSISTANT

## 2025-06-10 PROCEDURE — 3074F SYST BP LT 130 MM HG: CPT | Performed by: PHYSICIAN ASSISTANT

## 2025-06-10 RX ORDER — LEVOCETIRIZINE DIHYDROCHLORIDE 5 MG/1
5 TABLET, FILM COATED ORAL NIGHTLY
Qty: 30 TABLET | Refills: 0 | Status: SHIPPED | OUTPATIENT
Start: 2025-06-10

## 2025-06-10 RX ORDER — FAMOTIDINE 40 MG/1
40 TABLET, FILM COATED ORAL DAILY
COMMUNITY

## 2025-06-10 SDOH — ECONOMIC STABILITY: FOOD INSECURITY: WITHIN THE PAST 12 MONTHS, YOU WORRIED THAT YOUR FOOD WOULD RUN OUT BEFORE YOU GOT MONEY TO BUY MORE.: NEVER TRUE

## 2025-06-10 SDOH — ECONOMIC STABILITY: FOOD INSECURITY: WITHIN THE PAST 12 MONTHS, THE FOOD YOU BOUGHT JUST DIDN'T LAST AND YOU DIDN'T HAVE MONEY TO GET MORE.: NEVER TRUE

## 2025-06-10 ASSESSMENT — PATIENT HEALTH QUESTIONNAIRE - PHQ9
10. IF YOU CHECKED OFF ANY PROBLEMS, HOW DIFFICULT HAVE THESE PROBLEMS MADE IT FOR YOU TO DO YOUR WORK, TAKE CARE OF THINGS AT HOME, OR GET ALONG WITH OTHER PEOPLE: SOMEWHAT DIFFICULT
1. LITTLE INTEREST OR PLEASURE IN DOING THINGS: NOT AT ALL
8. MOVING OR SPEAKING SO SLOWLY THAT OTHER PEOPLE COULD HAVE NOTICED. OR THE OPPOSITE, BEING SO FIGETY OR RESTLESS THAT YOU HAVE BEEN MOVING AROUND A LOT MORE THAN USUAL: NEARLY EVERY DAY
2. FEELING DOWN, DEPRESSED OR HOPELESS: NEARLY EVERY DAY
3. TROUBLE FALLING OR STAYING ASLEEP: NEARLY EVERY DAY
SUM OF ALL RESPONSES TO PHQ QUESTIONS 1-9: 17
SUM OF ALL RESPONSES TO PHQ QUESTIONS 1-9: 17
6. FEELING BAD ABOUT YOURSELF - OR THAT YOU ARE A FAILURE OR HAVE LET YOURSELF OR YOUR FAMILY DOWN: NOT AT ALL
SUM OF ALL RESPONSES TO PHQ QUESTIONS 1-9: 17
5. POOR APPETITE OR OVEREATING: NEARLY EVERY DAY
SUM OF ALL RESPONSES TO PHQ QUESTIONS 1-9: 17
7. TROUBLE CONCENTRATING ON THINGS, SUCH AS READING THE NEWSPAPER OR WATCHING TELEVISION: MORE THAN HALF THE DAYS
9. THOUGHTS THAT YOU WOULD BE BETTER OFF DEAD, OR OF HURTING YOURSELF: NOT AT ALL
4. FEELING TIRED OR HAVING LITTLE ENERGY: NEARLY EVERY DAY

## 2025-06-10 ASSESSMENT — ENCOUNTER SYMPTOMS
GASTROINTESTINAL NEGATIVE: 1
COUGH: 1
RHINORRHEA: 1

## 2025-06-10 NOTE — PROGRESS NOTES
Chief Complaint   Patient presents with    Congestion    Sinusitis    watery eyes       Have you seen any other physician or provider since your last visit yes - Elaine Evans    Have you had any other diagnostic tests since your last visit? no    Have you changed or stopped any medications since your last visit? no     I have recommended that this patient have a sigmoidoscopy but he due to refusal reason: not comfortable with test   I have discussed the risks and benefits of this examination with him. The patient verbalizes understanding.  Provider will be informed of refusal.      Patient is a former patient of Elaine Evans. He is c/o sinusitis and has not been taking allegra.      
present.      Pulses: Normal pulses.      Heart sounds: Normal heart sounds.   Pulmonary:      Effort: Pulmonary effort is normal. No respiratory distress.      Breath sounds: Normal breath sounds. No wheezing, rhonchi or rales.   Musculoskeletal:         General: Normal range of motion.   Skin:     General: Skin is warm and dry.   Neurological:      General: No focal deficit present.      Mental Status: He is alert and oriented to person, place, and time. Mental status is at baseline.   Psychiatric:         Mood and Affect: Mood normal.         Behavior: Behavior normal.           Assessment:      1. Rhinorrhea  2. Watery eyes  3. Tachycardia         Plan:      Patient here for rhinorrhea, watery has, congestion and cough for over a year.  Does not take any allergy medicine or medicine for his symptoms.  Has been on nasal spray at 1 point in time but states it did not help so he quit taking it.  Based on history and physical appears to be allergies.  Will start on Xyzal and see if symptoms improve.  Bring back in 1 week to establish care.  Initially heart rate 124 when he got here.  105 on recheck.  Patient unsure if he took his metoprolol today or not.    No orders of the defined types were placed in this encounter.     Orders Placed This Encounter   Medications    levocetirizine (XYZAL) 5 MG tablet     Sig: Take 1 tablet by mouth nightly     Dispense:  30 tablet     Refill:  0       DDx includes but is not limited to -COVID, flu, URI, allergies, pneumonia    Discussed ddx/dx and tx plan with pt. Discussed risks vs benefits of tx. Pt and/or Guardian is/are A&Ox3 and verbally acknowledges understanding. Pt agrees with tx plan. Pt agrees if acute worsening to go to the ER for evaluation and tx, or return to clinic for re-evaluation. Otherwise, pt should f/u with PCP. Pt verbally acknowledges understanding of results of any tests, procedures and/or imaging done at this visit.    Parts of this document/medical record

## 2025-07-23 ENCOUNTER — FOLLOWUP TELEPHONE ENCOUNTER (OUTPATIENT)
Dept: EMERGENCY DEPT | Facility: HOSPITAL | Age: 50
End: 2025-07-23

## 2025-07-23 NOTE — TELEPHONE ENCOUNTER
50 yo male with AUD contacted [facility] via telephone call. Pt stated that he has been struggling with alcohol use again and is interested in some treatment options. [facility] and pt discussed treatment options, and [facility] gave pt resources that provide transportation. [facility] offered to call with pt, but stated that he can make the calls himself. [facility] offered support and encouraged pt to contact [facility] for additional support as needed.    [facility]auricio López [facility]

## 2025-07-28 ENCOUNTER — FOLLOWUP TELEPHONE ENCOUNTER (OUTPATIENT)
Dept: EMERGENCY DEPT | Facility: HOSPITAL | Age: 50
End: 2025-07-28

## 2025-07-28 NOTE — TELEPHONE ENCOUNTER
Pt contacted [facility], stating that he hasn't been doing very well and is interested in treatment for AUD. Pt stated that he gets paid Friday and needs to pay his bills and can contact [facility] afterward or on the following Monday. [facility] and pt discussed treatment options, and pt has tried [facility] in [place], but hasn't been able to get through because of living in a bad service area. We discussed staying in contact this week and possibly transporting pt to hospital to make calls and get treatment placement.    Mauricio MONTES/MORRISS

## 2025-08-07 ENCOUNTER — FOLLOWUP TELEPHONE ENCOUNTER (OUTPATIENT)
Dept: EMERGENCY DEPT | Facility: HOSPITAL | Age: 50
End: 2025-08-07

## 2025-08-25 ENCOUNTER — FOLLOWUP TELEPHONE ENCOUNTER (OUTPATIENT)
Dept: EMERGENCY DEPT | Facility: HOSPITAL | Age: 50
End: 2025-08-25

## (undated) DEVICE — GOWN,PREVENTION PLUS,XXLARGE,STERILE: Brand: MEDLINE

## (undated) DEVICE — SNAP KOVER: Brand: UNBRANDED

## (undated) DEVICE — SKIN AFFIX SURG ADHESIVE 72/CS 0.55ML: Brand: MEDLINE

## (undated) DEVICE — TAPE MICROFM 2IN LF

## (undated) DEVICE — ANTIBACTERIAL UNDYED BRAIDED (POLYGLACTIN 910), SYNTHETIC ABSORBABLE SUTURE: Brand: COATED VICRYL

## (undated) DEVICE — SYR EPILOR LOR LL 7ML LF

## (undated) DEVICE — SUT SILK 2/0 PS 18IN 1588H

## (undated) DEVICE — CATH 81102 ALGOLINE W CLSD TIP STRN RELF

## (undated) DEVICE — DRAPE,REIN 53X77,STERILE: Brand: MEDLINE

## (undated) DEVICE — PK MINOR SPLT 10

## (undated) DEVICE — ADHS LIQ MASTISOL 2/3ML

## (undated) DEVICE — 3M™ IOBAN™ 2 ANTIMICROBIAL INCISE DRAPE 6650EZ: Brand: IOBAN™ 2

## (undated) DEVICE — GLV SURG BIOGEL LTX PF 8

## (undated) DEVICE — APPL CHLORAPREP W/TINT 26ML BLU

## (undated) DEVICE — SOL LR 1000ML

## (undated) DEVICE — C-ARM: Brand: UNBRANDED

## (undated) DEVICE — DRSNG SURESITE WNDW 4X4.5

## (undated) DEVICE — 3M™ STERI-STRIP™ REINFORCED ADHESIVE SKIN CLOSURES, R1547, 1/2 IN X 4 IN (12 MM X 100 MM), 6 STRIPS/ENVELOPE: Brand: 3M™ STERI-STRIP™